# Patient Record
Sex: FEMALE | Race: BLACK OR AFRICAN AMERICAN | NOT HISPANIC OR LATINO | Employment: FULL TIME | URBAN - METROPOLITAN AREA
[De-identification: names, ages, dates, MRNs, and addresses within clinical notes are randomized per-mention and may not be internally consistent; named-entity substitution may affect disease eponyms.]

---

## 2017-02-22 ENCOUNTER — OFFICE VISIT (OUTPATIENT)
Dept: FAMILY MEDICINE | Facility: CLINIC | Age: 22
End: 2017-02-22
Payer: COMMERCIAL

## 2017-02-22 VITALS
OXYGEN SATURATION: 96 % | HEART RATE: 111 BPM | RESPIRATION RATE: 15 BRPM | HEIGHT: 62 IN | DIASTOLIC BLOOD PRESSURE: 66 MMHG | TEMPERATURE: 99.1 F | SYSTOLIC BLOOD PRESSURE: 110 MMHG | BODY MASS INDEX: 24.29 KG/M2 | WEIGHT: 132 LBS

## 2017-02-22 DIAGNOSIS — R21 RASH: Primary | ICD-10-CM

## 2017-02-22 PROCEDURE — 99213 OFFICE O/P EST LOW 20 MIN: CPT | Performed by: FAMILY MEDICINE

## 2017-02-22 RX ORDER — PERMETHRIN 50 MG/G
CREAM TOPICAL
Qty: 60 G | Refills: 1 | Status: SHIPPED | OUTPATIENT
Start: 2017-02-22 | End: 2018-01-11

## 2017-02-22 NOTE — NURSING NOTE
"Chief Complaint   Patient presents with     Derm Problem     possible scabies       Initial /66 (BP Location: Right arm, Patient Position: Chair, Cuff Size: Adult Regular)  Pulse 111  Temp 99.1  F (37.3  C) (Oral)  Resp 15  Ht 5' 2\" (1.575 m)  Wt 132 lb (59.9 kg)  SpO2 96%  BMI 24.14 kg/m2 Estimated body mass index is 24.14 kg/(m^2) as calculated from the following:    Height as of this encounter: 5' 2\" (1.575 m).    Weight as of this encounter: 132 lb (59.9 kg).  Medication Reconciliation: complete     Larry Tejeda CMA      "

## 2017-02-22 NOTE — PROGRESS NOTES
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      Duration: 3 days    Description  Location: chest, neck  Itching: moderate    Intensity:  Spots, redness    Accompanying signs and symptoms: see above    History (similar episodes/previous evaluation): None    Precipitating or alleviating factors:  New exposures:  None and works at a nursing home  Recent travel: no      Therapies tried and outcome: none       Past Medical History   Diagnosis Date     CAL (generalised anxiety disorder)      Moderate major depression (H)      Current Outpatient Prescriptions   Medication Sig Dispense Refill     permethrin (ELIMITE) 5 % cream Apply cream from head to toe (except the face); leave on for 8-14 hours then wash off with water; reapply in 1 week if live mites appear. 60 g 1     escitalopram (LEXAPRO) 10 MG tablet Take 1.5 tablets (15 mg) by mouth daily 30 tablet 0     Social History   Substance Use Topics     Smoking status: Never Smoker     Smokeless tobacco: Never Used     Alcohol use No       ROS:  CONSTITUTIONAL:NEGATIVE for fever, chills, change in weight  EYES: NEGATIVE for vision changes or irritation    EXAM:   /66 (BP Location: Right arm, Patient Position: Chair, Cuff Size: Adult Regular)  Pulse 111  Temp 99.1  F (37.3  C) (Oral)  Resp 15  Ht 5' 2\" (1.575 m)  Wt 132 lb (59.9 kg)  SpO2 96%  BMI 24.14 kg/m2  GENERAL: alert, no acute distress.  SKIN: Rash description:    Distribution: localized  Location: L arm, upper, chest and neck    Color: red,  Lesion type: tiny pinpoint linear  with no other findings  GENERAL APPEARANCE: healthy, alert and no distress  EYES: EOMI,  PERRL, conjunctiva clear  NECK: supple, non-tender to palpation, no adenopathy noted  RESP: lungs clear to auscultation - no rales, rhonchi or wheezes  CV: regular rates " and rhythm, normal S1 S2, no murmur noted    ASSESSMENT:  Rash    PLAN:  Cover for scabies but dont think this is the case.     1) See today's orders.  2) Follow-up with primary clinic if not improving

## 2017-02-22 NOTE — MR AVS SNAPSHOT
"              After Visit Summary   2/22/2017    Mica Roy    MRN: [id number]           Patient Information     Date Of Birth          1995        Visit Information        Provider Department      2/22/2017 3:00 PM Awais Lynn MD [facility]        Today's Diagnoses     Rash    -  1       Follow-ups after your visit        Who to contact     If you have questions or need follow up information about today's clinic visit or your schedule please contact [facility] directly at [phone].  Normal or non-critical lab and imaging results will be communicated to you by MyChart, letter or phone within 4 business days after the clinic has received the results. If you do not hear from us within 7 days, please contact the clinic through MyChart or phone. If you have a critical or abnormal lab result, we will notify you by phone as soon as possible.  Submit refill requests through [employer] or call your pharmacy and they will forward the refill request to us. Please allow 3 business days for your refill to be completed.          Additional Information About Your Visit        Care EveryWhere ID     This is your Care EveryWhere ID. This could be used by other organizations to access your [facility] medical records  [id number]        Your Vitals Were     Pulse Temperature Respirations Height Pulse Oximetry BMI (Body Mass Index)    111 99.1  F (37.3  C) (Oral) 15 5' 2\" (1.575 m) 96% 24.14 kg/m2       Blood Pressure from Last 3 Encounters:   02/22/17 110/66   12/07/16 106/70   10/03/16 90/64    Weight from Last 3 Encounters:   02/22/17 132 lb (59.9 kg)   12/07/16 130 lb (59 kg)   10/03/16 125 lb (56.7 kg)              Today, you had the following     No orders found for display         Today's Medication Changes          These changes are accurate as of: 2/22/17  3:38 PM.  If you have any questions, ask your nurse or doctor.               Start taking these medicines.        " Dose/Directions    permethrin 5 % cream   Commonly known as:  ELIMITE   Used for:  Rash   Started by:  Awais Lynn MD        Apply cream from head to toe (except the face); leave on for 8-14 hours then wash off with water; reapply in 1 week if live mites appear.   Quantity:  60 g   Refills:  1            Where to get your medicines      These medications were sent to [facility] [id number]  [place], [place] - [address] AT [place] [address]  [address], [place] [address]     Phone:  [phone]     permethrin 5 % cream                Primary Care Provider Office Phone # Fax #    Karen Weiler, [phone]       [facility] [address]  [place] [address]        Thank you!     Thank you for choosing [facility]  for your care. Our goal is always to provide you with excellent care. Hearing back from our patients is one way we can continue to improve our services. Please take a few minutes to complete the written survey that you may receive in the mail after your visit with us. Thank you!             Your Updated Medication List - Protect others around you: Learn how to safely use, store and throw away your medicines at www.disposemymeds.org.          This list is accurate as of: 2/22/17  3:38 PM.  Always use your most recent med list.                   Brand Name Dispense Instructions for use    escitalopram 10 MG tablet    LEXAPRO    30 tablet    Take 1.5 tablets (15 mg) by mouth daily       permethrin 5 % cream    ELIMITE    60 g    Apply cream from head to toe (except the face); leave on for 8-14 hours then wash off with water; reapply in 1 week if live mites appear.

## 2017-03-14 DIAGNOSIS — F41.1 GENERALIZED ANXIETY DISORDER: ICD-10-CM

## 2017-03-14 DIAGNOSIS — F90.9 ATTENTION DEFICIT HYPERACTIVITY DISORDER (ADHD), UNSPECIFIED ADHD TYPE: ICD-10-CM

## 2017-03-14 DIAGNOSIS — F33.1 MAJOR DEPRESSIVE DISORDER, RECURRENT EPISODE, MODERATE (H): ICD-10-CM

## 2017-03-14 NOTE — TELEPHONE ENCOUNTER
Please call patient at [phone] when rx is ready to  at .  (Ok to leave detailed message).    Refill request received via: phone  Patient requesting refill for: Adderall 10 mg and Lexapro    Last Office Visit: 12/7/2016  Last Refill (see below):    amphetamine-dextroamphetamine (ADDERALL XR) 10 MG per capsule (Discontinued) 30 capsule 0 11/27/2015 12/31/2015 No   Sig: Take 1 capsule (10 mg) by mouth daily   Class: Local Print   Route: Oral   Reason for Discontinue: Reorder   Order: [id number]     Tonja Young  -[facility]

## 2017-03-14 NOTE — TELEPHONE ENCOUNTER
Lexapro     Last Written Prescription Date: 10/3/16  Last Fill Quantity: 30, # refills: 0  Last Office Visit with G primary care provider:  12/7/16        Last PHQ-9 score on record=   PHQ-9 SCORE 12/7/2016   Total Score 18     Routing refill request to provider for review/approval because:  Labs out of range:  PHQ-9  A break in medication  Luzmaria Pena RN, BSN  [facility]

## 2017-03-15 RX ORDER — ESCITALOPRAM OXALATE 10 MG/1
15 TABLET ORAL DAILY
Qty: 45 TABLET | Refills: 0 | Status: SHIPPED | OUTPATIENT
Start: 2017-03-15 | End: 2018-01-11

## 2017-03-15 RX ORDER — DEXTROAMPHETAMINE SACCHARATE, AMPHETAMINE ASPARTATE MONOHYDRATE, DEXTROAMPHETAMINE SULFATE AND AMPHETAMINE SULFATE 2.5; 2.5; 2.5; 2.5 MG/1; MG/1; MG/1; MG/1
10 CAPSULE, EXTENDED RELEASE ORAL DAILY
Qty: 30 CAPSULE | Refills: 0 | Status: SHIPPED | OUTPATIENT
Start: 2017-03-15 | End: 2017-10-05

## 2017-06-28 ENCOUNTER — TELEPHONE (OUTPATIENT)
Dept: FAMILY MEDICINE | Facility: CLINIC | Age: 22
End: 2017-06-28

## 2017-06-28 NOTE — LETTER
[facility]  [address]  [facility] [id number] [phone]  July 14, 2017    Mica Roy  [address] [facility] [address] [address]    Dear Mica,    I care about your health and have reviewed your health plan. I have reviewed your medical conditions, medication list, and lab results and am making recommendations based on this review, to better manage your health.    You are in particular need of attention regarding:  -Depression    I am recommending that you:  -Please complete the attached questionnaire and mail it back to us in the enclosed envelope.    Here is a list of Health Maintenance topics that are due now or due soon:  Health Maintenance Due   Topic Date Due     HPV IMMUNIZATION (3 of 3 - Female 3 Dose Series) 12/30/2016     PHQ-9 Q6 MONTHS  06/07/2017       Please call us at [phone] (or use [email or web address]) to address the above recommendations.     Thank you for trusting [facility] and we appreciate the opportunity to serve you.  We look forward to supporting your healthcare needs in the future.    Healthy Regards,    Karen Weiler, MD

## 2017-06-28 NOTE — TELEPHONE ENCOUNTER
Needs of attention regarding:  -Depression    Health Maintenance Topics with due status: Overdue       Topic Date Due    HPV IMMUNIZATION 12/30/2016    PHQ-9 Q6 MONTHS 06/07/2017       Communication:  Patient due for a PHQ-9 between 5/7/2017-7/7/2017

## 2017-10-03 DIAGNOSIS — F90.9 ATTENTION DEFICIT HYPERACTIVITY DISORDER (ADHD), UNSPECIFIED ADHD TYPE: ICD-10-CM

## 2017-10-03 RX ORDER — DEXTROAMPHETAMINE SACCHARATE, AMPHETAMINE ASPARTATE MONOHYDRATE, DEXTROAMPHETAMINE SULFATE AND AMPHETAMINE SULFATE 2.5; 2.5; 2.5; 2.5 MG/1; MG/1; MG/1; MG/1
10 CAPSULE, EXTENDED RELEASE ORAL DAILY
Qty: 30 CAPSULE | Refills: 0 | Status: CANCELLED | OUTPATIENT
Start: 2017-10-03

## 2017-10-03 NOTE — TELEPHONE ENCOUNTER
Please call patient at [phone] when rx is ready to  at .  (Ok to leave detailed message).    Refill request received via: walk in  Patient requesting refill for: adderall    Last Office Visit: 2/22/17  Last Refill (see below):    MG per 24 hr capsule 30 capsule 0 3/15/2017  No   Sig: Take 1 capsule (10 mg) by mouth daily   Class: Local Print   Route: Oral   Order: [id number]     Tonja Young  Patient Representative-[facility]

## 2017-10-04 NOTE — TELEPHONE ENCOUNTER
Please have patient make a follow up appointment for this medication. She has not been on it in over a year.    Karen Weiler, MD

## 2017-10-05 ENCOUNTER — OFFICE VISIT (OUTPATIENT)
Dept: FAMILY MEDICINE | Facility: CLINIC | Age: 22
End: 2017-10-05
Payer: COMMERCIAL

## 2017-10-05 VITALS
OXYGEN SATURATION: 100 % | WEIGHT: 129 LBS | HEART RATE: 120 BPM | HEIGHT: 62 IN | TEMPERATURE: 98.5 F | SYSTOLIC BLOOD PRESSURE: 112 MMHG | DIASTOLIC BLOOD PRESSURE: 68 MMHG | BODY MASS INDEX: 23.74 KG/M2

## 2017-10-05 DIAGNOSIS — F32.A DEPRESSION, UNSPECIFIED DEPRESSION TYPE: Primary | ICD-10-CM

## 2017-10-05 DIAGNOSIS — Z23 NEED FOR PROPHYLACTIC VACCINATION AND INOCULATION AGAINST INFLUENZA: ICD-10-CM

## 2017-10-05 DIAGNOSIS — F90.9 ATTENTION DEFICIT HYPERACTIVITY DISORDER (ADHD), UNSPECIFIED ADHD TYPE: ICD-10-CM

## 2017-10-05 DIAGNOSIS — Z23 NEED FOR HPV VACCINE: ICD-10-CM

## 2017-10-05 PROCEDURE — 90686 IIV4 VACC NO PRSV 0.5 ML IM: CPT | Performed by: PHYSICIAN ASSISTANT

## 2017-10-05 PROCEDURE — 90472 IMMUNIZATION ADMIN EACH ADD: CPT | Performed by: PHYSICIAN ASSISTANT

## 2017-10-05 PROCEDURE — 90471 IMMUNIZATION ADMIN: CPT | Performed by: PHYSICIAN ASSISTANT

## 2017-10-05 PROCEDURE — 99214 OFFICE O/P EST MOD 30 MIN: CPT | Mod: 25 | Performed by: PHYSICIAN ASSISTANT

## 2017-10-05 PROCEDURE — 90651 9VHPV VACCINE 2/3 DOSE IM: CPT | Performed by: PHYSICIAN ASSISTANT

## 2017-10-05 RX ORDER — CETIRIZINE HYDROCHLORIDE 10 MG/1
10 TABLET ORAL DAILY
COMMUNITY
End: 2018-06-12

## 2017-10-05 RX ORDER — BUPROPION HYDROCHLORIDE 150 MG/1
150 TABLET ORAL EVERY MORNING
Qty: 30 TABLET | Refills: 1 | Status: SHIPPED | OUTPATIENT
Start: 2017-10-05 | End: 2017-12-07

## 2017-10-05 RX ORDER — ADAPALENE 0.1 G/100G
CREAM TOPICAL
COMMUNITY
Start: 2016-04-22 | End: 2018-01-11

## 2017-10-05 RX ORDER — DEXTROAMPHETAMINE SACCHARATE, AMPHETAMINE ASPARTATE MONOHYDRATE, DEXTROAMPHETAMINE SULFATE AND AMPHETAMINE SULFATE 2.5; 2.5; 2.5; 2.5 MG/1; MG/1; MG/1; MG/1
10 CAPSULE, EXTENDED RELEASE ORAL DAILY
Qty: 30 CAPSULE | Refills: 0 | Status: SHIPPED | OUTPATIENT
Start: 2017-10-05 | End: 2017-12-07

## 2017-10-05 ASSESSMENT — ANXIETY QUESTIONNAIRES
2. NOT BEING ABLE TO STOP OR CONTROL WORRYING: NEARLY EVERY DAY
5. BEING SO RESTLESS THAT IT IS HARD TO SIT STILL: NEARLY EVERY DAY
GAD7 TOTAL SCORE: 12
7. FEELING AFRAID AS IF SOMETHING AWFUL MIGHT HAPPEN: SEVERAL DAYS
IF YOU CHECKED OFF ANY PROBLEMS ON THIS QUESTIONNAIRE, HOW DIFFICULT HAVE THESE PROBLEMS MADE IT FOR YOU TO DO YOUR WORK, TAKE CARE OF THINGS AT HOME, OR GET ALONG WITH OTHER PEOPLE: VERY DIFFICULT
6. BECOMING EASILY ANNOYED OR IRRITABLE: NOT AT ALL
1. FEELING NERVOUS, ANXIOUS, OR ON EDGE: MORE THAN HALF THE DAYS
3. WORRYING TOO MUCH ABOUT DIFFERENT THINGS: NEARLY EVERY DAY

## 2017-10-05 ASSESSMENT — PATIENT HEALTH QUESTIONNAIRE - PHQ9
SUM OF ALL RESPONSES TO PHQ QUESTIONS 1-9: 15
5. POOR APPETITE OR OVEREATING: NOT AT ALL

## 2017-10-05 NOTE — PATIENT INSTRUCTIONS
Bupropion extended-release tablets (Depression/Mood Disorders)  What is this medicine?  BUPROPION (byoo PROE pee on) is used to treat depression.  How should I use this medicine?  Take this medicine by mouth with a glass of water. Follow the directions on the prescription label. You can take it with or without food. If it upsets your stomach, take it with food. Do not crush, chew, or cut these tablets. This medicine is taken once daily at the same time each day. Do not take your medicine more often than directed. Do not stop taking this medicine suddenly except upon the advice of your doctor. Stopping this medicine too quickly may cause serious side effects or your condition may worsen.  A special MedGuide will be given to you by the pharmacist with each prescription and refill. Be sure to read this information carefully each time.  Talk to your pediatrician regarding the use of this medicine in children. Special care may be needed.  What side effects may I notice from receiving this medicine?  Side effects that you should report to your doctor or health care professional as soon as possible:    allergic reactions like skin rash, itching or hives, swelling of the face, lips, or tongue    breathing problems    changes in vision    confusion    fast or irregular heartbeat    hallucinations    increased blood pressure    redness, blistering, peeling or loosening of the skin, including inside the mouth    seizures    suicidal thoughts or other mood changes    unusually weak or tired    vomiting  Side effects that usually do not require medical attention (report to your doctor or health care professional if they continue or are bothersome):    change in sex drive or performance    constipation    headache    loss of appetite    nausea    tremors    weight loss  What may interact with this medicine?  Do not take this medicine with any of the following medications:    linezolid    MAOIs like Azilect, Carbex, Eldepryl,  Marplan, Nardil, and Parnate    methylene blue (injected into a vein)    other medicines that contain bupropion like Zyban  This medicine may also interact with the following medications:    alcohol    certain medicines for anxiety or sleep    certain medicines for blood pressure like metoprolol, propranolol    certain medicines for depression or psychotic disturbances    certain medicines for HIV or AIDS like efavirenz, lopinavir, nelfinavir, ritonavir    certain medicines for irregular heart beat like propafenone, flecainide    certain medicines for Parkinson's disease like amantadine, levodopa    certain medicines for seizures like carbamazepine, phenytoin, phenobarbital    cimetidine    clopidogrel    cyclophosphamide    furazolidone    isoniazid    nicotine    orphenadrine    procarbazine    steroid medicines like prednisone or cortisone    stimulant medicines for attention disorders, weight loss, or to stay awake    tamoxifen    theophylline    thiotepa    ticlopidine    tramadol    warfarin  What if I miss a dose?  If you miss a dose, skip the missed dose and take your next tablet at the regular time. Do not take double or extra doses.  Where should I keep my medicine?  Keep out of the reach of children.  Store at room temperature between 15 and 30 degrees C (59 and 86 degrees F). Throw away any unused medicine after the expiration date.  What should I tell my health care provider before I take this medicine?  They need to know if you have any of these conditions:    an eating disorder, such as anorexia or bulimia    bipolar disorder or psychosis    diabetes or high blood sugar, treated with medication    glaucoma    head injury or brain tumor    heart disease, previous heart attack, or irregular heart beat    high blood pressure    kidney or liver disease    seizures (convulsions)    suicidal thoughts or a previous suicide attempt    Tourette's syndrome    weight loss    an unusual or allergic reaction to  bupropion, other medicines, foods, dyes, or preservatives    breast-feeding    pregnant or trying to become pregnant  What should I watch for while using this medicine?  Tell your doctor if your symptoms do not get better or if they get worse. Visit your doctor or health care professional for regular checks on your progress. Because it may take several weeks to see the full effects of this medicine, it is important to continue your treatment as prescribed by your doctor.  Patients and their families should watch out for new or worsening thoughts of suicide or depression. Also watch out for sudden changes in feelings such as feeling anxious, agitated, panicky, irritable, hostile, aggressive, impulsive, severely restless, overly excited and hyperactive, or not being able to sleep. If this happens, especially at the beginning of treatment or after a change in dose, call your health care professional.  Avoid alcoholic drinks while taking this medicine. Drinking large amounts of alcoholic beverages, using sleeping or anxiety medicines, or quickly stopping the use of these agents while taking this medicine may increase your risk for a seizure.  Do not drive or use heavy machinery until you know how this medicine affects you. This medicine can impair your ability to perform these tasks.  Do not take this medicine close to bedtime. It may prevent you from sleeping.  Your mouth may get dry. Chewing sugarless gum or sucking hard candy, and drinking plenty of water may help. Contact your doctor if the problem does not go away or is severe.  The tablet shell for some brands of this medicine does not dissolve. This is normal. The tablet shell may appear whole in the stool. This is not a cause for concern.  NOTE:This sheet is a summary. It may not cover all possible information. If you have questions about this medicine, talk to your doctor, pharmacist, or health care provider. Copyright  2017 [employer]

## 2017-10-05 NOTE — NURSING NOTE
"Chief Complaint   Patient presents with     Depression     Anxiety     Recheck Medication       Initial /68 (BP Location: Right arm, Patient Position: Sitting, Cuff Size: Adult Regular)  Pulse 120  Temp 98.5  F (36.9  C) (Oral)  Ht 5' 2\" (1.575 m)  Wt 129 lb (58.5 kg)  LMP 10/04/2017  SpO2 100%  BMI 23.59 kg/m2 Estimated body mass index is 23.59 kg/(m^2) as calculated from the following:    Height as of this encounter: 5' 2\" (1.575 m).    Weight as of this encounter: 129 lb (58.5 kg).  Medication Reconciliation: complete   Rhea Durham MA    "

## 2017-10-05 NOTE — PROGRESS NOTES
SUBJECTIVE:   Mica Roy is a 22 year old female who presents to clinic today for the following health issues:    Pt retried Lexapro in March 2017, first time she took it in 2016, pt felt fine with it  but this time it didn't work out for her, pt felt nauseous from it and actually vomited and stopped taking it after 2 weeks.     Depression and Anxiety Follow-Up    Status since last visit: No change    Other associated symptoms:Pt's energy is down, pt has been skipping some of her classes, pt says she has no motivation to get up and get her day going.  Pt says her mom has noticed that she's changed. Pt has a hard time concentrating in class and doing everyday things.    Complicating factors:     Significant life event: Yes-  Pt says she broke up with her boyfriend but that didn't cause any change in mood     Current substance abuse: None    PHQ-9 Score and MyChart F/U Questions 8/30/2016 12/7/2016 10/5/2017   Total Score 15 18 15   Q9: Suicide Ideation Not at all Several days Not at all     CAL-7 SCORE 8/30/2016 12/7/2016 10/5/2017   Total Score - - -   Total Score 12 14 12     In the past two weeks have you had thoughts of suicide or self-harm?  No.    Do you have concerns about your personal safety or the safety of others?   No    PHQ-9  English  PHQ-9   Any Language  GAD7  Suicide Assessment Five-step Evaluation and Treatment (SAFE-T)      Amount of exercise or physical activity: 4-5 days/week for an average of 2 hours, pt has been obsessing over calories in th gym, and make sure she burns 100 calories each time she goes to the gym.    Problems taking medications regularly: Hasn't been taking medications regularly    Medication side effects: none  Diet: regular (no restrictions)    Medication Followup of Adderall    Taking Medication as prescribed: yes    Side Effects:  None    Medication Helping Symptoms:  She has been taking it on and off, she mentioned more towards the summer she didn't take it.  Pt tries  to take it everyday now that schools started, but she says she feels weird with taking it, Pt says it's hard to fall asleep when she takes it.      Took Lexapro for almost a year and was feeling good on it    Takes Adderall around 8 AM and has a hard time falling asleep at night on the days she takes it. Sleeps better on nights when she doesn't take Adderall in the morning.  Occ trouble breathing with Adderall. No appetite suppression, chest pain, abdominal pain, headaches, palpitations.    Problem list and histories reviewed & adjusted, as indicated.  Additional history: as documented    Patient Active Problem List   Diagnosis     Achilles tendonitis     Knee pain     Moderate major depression (H)     Generalized anxiety disorder     Attention deficit hyperactivity disorder (ADHD)     Past Surgical History:   Procedure Laterality Date     NO HISTORY OF SURGERY         Social History   Substance Use Topics     Smoking status: Never Smoker     Smokeless tobacco: Never Used     Alcohol use No     Family History   Problem Relation Age of Onset     Depression Mother      DIABETES No family hx of      Coronary Artery Disease No family hx of      Hypertension Mother      Hyperlipidemia No family hx of      CEREBROVASCULAR DISEASE No family hx of      Breast Cancer No family hx of      Colon Cancer No family hx of      Other Cancer No family hx of      OSTEOPOROSIS Mother      Thyroid Disease No family hx of          Current Outpatient Prescriptions   Medication Sig Dispense Refill     cetirizine (ZYRTEC) 10 MG tablet Take 10 mg by mouth daily       buPROPion (WELLBUTRIN XL) 150 MG 24 hr tablet Take 1 tablet (150 mg) by mouth every morning 30 tablet 1     amphetamine-dextroamphetamine (ADDERALL XR) 10 MG per 24 hr capsule Take 1 capsule (10 mg) by mouth daily 30 capsule 0     adapalene (DIFFERIN) 0.1 % cream Apply to pea-size amount to face at bedtime. Start every other night, increase to nightly as tolerated.        "benzoyl peroxide 5 % LIQD Wash face, chest, back daily in shower. Rinse thoroughly.       escitalopram (LEXAPRO) 10 MG tablet Take 1.5 tablets (15 mg) by mouth daily (Patient not taking: Reported on 10/5/2017) 45 tablet 0     permethrin (ELIMITE) 5 % cream Apply cream from head to toe (except the face); leave on for 8-14 hours then wash off with water; reapply in 1 week if live mites appear. (Patient not taking: Reported on 10/5/2017) 60 g 1     Allergies   Allergen Reactions     Seasonal Allergies          Reviewed and updated as needed this visit by clinical staff     Reviewed and updated as needed this visit by Provider         ROS:  CONSTITUTIONAL:NEGATIVE  for chills and fever  RESP:POSITIVE for intermittent SOB  CV: NEGATIVE for chest pain, palpitations or peripheral edema  GI: NEGATIVE for nausea, abdominal pain, heartburn, or change in bowel habits  PSYCHIATRIC: POSITIVE for depression, ADD symptoms, sleep issues    OBJECTIVE:     /68 (BP Location: Right arm, Patient Position: Sitting, Cuff Size: Adult Regular)  Pulse 120  Temp 98.5  F (36.9  C) (Oral)  Ht 5' 2\" (1.575 m)  Wt 129 lb (58.5 kg)  LMP 10/04/2017  SpO2 100%  BMI 23.59 kg/m2  Body mass index is 23.59 kg/(m^2).  GENERAL: healthy, alert and no distress  RESP: lungs clear to auscultation - no rales, rhonchi or wheezes  CV: regular rate and rhythm, normal S1 S2, no S3 or S4, no murmur, click or rub, no peripheral edema and peripheral pulses strong  PSYCH: mentation appears normal, affect normal/bright    Diagnostic Test Results:  none     ASSESSMENT/PLAN:     1. Depression, unspecified depression type  Will try Wellbutrin for depression symptoms. Discussed possible side effects. Follow-up in 1 month for recheck. May need to increase dose to 300mg.  - buPROPion (WELLBUTRIN XL) 150 MG 24 hr tablet; Take 1 tablet (150 mg) by mouth every morning  Dispense: 30 tablet; Refill: 1    2. Attention deficit hyperactivity disorder (ADHD), unspecified " ADHD type  Appears to be sensitive to stimulant effects of Adderall (particularly in terms of sleep). Will try non-stimulant medication that will also ideally help with her depression. Discussed that bupropion has slower onset of action than stimulants, so OK to continue on Adderall for the next month. May need to increase bupropion to 300mg after 1 month if tolerating well.  - buPROPion (WELLBUTRIN XL) 150 MG 24 hr tablet; Take 1 tablet (150 mg) by mouth every morning  Dispense: 30 tablet; Refill: 1  - amphetamine-dextroamphetamine (ADDERALL XR) 10 MG per 24 hr capsule; Take 1 capsule (10 mg) by mouth daily  Dispense: 30 capsule; Refill: 0    3. Need for HPV vaccine  - HUMAN PAPILLOMA VIRUS (GARDASIL 9) VACCINE  - EA ADD'L VACCINE    4. Need for prophylactic vaccination and inoculation against influenza  - FLU VAC, SPLIT VIRUS IM > 3 YO (QUADRIVALENT) [77709]  - Vaccine Administration, Initial [42683]    Yue Oliver PA-C  [facility]      Injectable Influenza Immunization Documentation    1.  Is the person to be vaccinated sick today?   No    2. Does the person to be vaccinated have an allergy to a component   of the vaccine?   No    3. Has the person to be vaccinated ever had a serious reaction   to influenza vaccine in the past?   No    4. Has the person to be vaccinated ever had Guillain-Barré syndrome?   No    Form completed by Rhea Durham MA

## 2017-10-05 NOTE — MR AVS SNAPSHOT
After Visit Summary   10/5/2017    Mica Roy    MRN: [id number]           Patient Information     Date Of Birth          1995        Visit Information        Provider Department      10/5/2017 11:40 AM Yue Oliver PA-C [facility]        Today's Diagnoses     Depression, unspecified depression type    -  1    Attention deficit hyperactivity disorder (ADHD), unspecified ADHD type        Need for HPV vaccine        Need for prophylactic vaccination and inoculation against influenza          Care Instructions      Bupropion extended-release tablets (Depression/Mood Disorders)  What is this medicine?  BUPROPION (byoo PROE pee on) is used to treat depression.  How should I use this medicine?  Take this medicine by mouth with a glass of water. Follow the directions on the prescription label. You can take it with or without food. If it upsets your stomach, take it with food. Do not crush, chew, or cut these tablets. This medicine is taken once daily at the same time each day. Do not take your medicine more often than directed. Do not stop taking this medicine suddenly except upon the advice of your doctor. Stopping this medicine too quickly may cause serious side effects or your condition may worsen.  A special MedGuide will be given to you by the pharmacist with each prescription and refill. Be sure to read this information carefully each time.  Talk to your pediatrician regarding the use of this medicine in children. Special care may be needed.  What side effects may I notice from receiving this medicine?  Side effects that you should report to your doctor or health care professional as soon as possible:    allergic reactions like skin rash, itching or hives, swelling of the face, lips, or tongue    breathing problems    changes in vision    confusion    fast or irregular heartbeat    hallucinations    increased blood pressure    redness, blistering, peeling or loosening of the  skin, including inside the mouth    seizures    suicidal thoughts or other mood changes    unusually weak or tired    vomiting  Side effects that usually do not require medical attention (report to your doctor or health care professional if they continue or are bothersome):    change in sex drive or performance    constipation    headache    loss of appetite    nausea    tremors    weight loss  What may interact with this medicine?  Do not take this medicine with any of the following medications:    linezolid    MAOIs like Azilect, Carbex, Eldepryl, Marplan, Nardil, and Parnate    methylene blue (injected into a vein)    other medicines that contain bupropion like Zyban  This medicine may also interact with the following medications:    alcohol    certain medicines for anxiety or sleep    certain medicines for blood pressure like metoprolol, propranolol    certain medicines for depression or psychotic disturbances    certain medicines for HIV or AIDS like efavirenz, lopinavir, nelfinavir, ritonavir    certain medicines for irregular heart beat like propafenone, flecainide    certain medicines for Parkinson's disease like amantadine, levodopa    certain medicines for seizures like carbamazepine, phenytoin, phenobarbital    cimetidine    clopidogrel    cyclophosphamide    furazolidone    isoniazid    nicotine    orphenadrine    procarbazine    steroid medicines like prednisone or cortisone    stimulant medicines for attention disorders, weight loss, or to stay awake    tamoxifen    theophylline    thiotepa    ticlopidine    tramadol    warfarin  What if I miss a dose?  If you miss a dose, skip the missed dose and take your next tablet at the regular time. Do not take double or extra doses.  Where should I keep my medicine?  Keep out of the reach of children.  Store at room temperature between 15 and 30 degrees C (59 and 86 degrees F). Throw away any unused medicine after the expiration date.  What should I tell my  health care provider before I take this medicine?  They need to know if you have any of these conditions:    an eating disorder, such as anorexia or bulimia    bipolar disorder or psychosis    diabetes or high blood sugar, treated with medication    glaucoma    head injury or brain tumor    heart disease, previous heart attack, or irregular heart beat    high blood pressure    kidney or liver disease    seizures (convulsions)    suicidal thoughts or a previous suicide attempt    Tourette's syndrome    weight loss    an unusual or allergic reaction to bupropion, other medicines, foods, dyes, or preservatives    breast-feeding    pregnant or trying to become pregnant  What should I watch for while using this medicine?  Tell your doctor if your symptoms do not get better or if they get worse. Visit your doctor or health care professional for regular checks on your progress. Because it may take several weeks to see the full effects of this medicine, it is important to continue your treatment as prescribed by your doctor.  Patients and their families should watch out for new or worsening thoughts of suicide or depression. Also watch out for sudden changes in feelings such as feeling anxious, agitated, panicky, irritable, hostile, aggressive, impulsive, severely restless, overly excited and hyperactive, or not being able to sleep. If this happens, especially at the beginning of treatment or after a change in dose, call your health care professional.  Avoid alcoholic drinks while taking this medicine. Drinking large amounts of alcoholic beverages, using sleeping or anxiety medicines, or quickly stopping the use of these agents while taking this medicine may increase your risk for a seizure.  Do not drive or use heavy machinery until you know how this medicine affects you. This medicine can impair your ability to perform these tasks.  Do not take this medicine close to bedtime. It may prevent you from sleeping.  Your mouth  "may get dry. Chewing sugarless gum or sucking hard candy, and drinking plenty of water may help. Contact your doctor if the problem does not go away or is severe.  The tablet shell for some brands of this medicine does not dissolve. This is normal. The tablet shell may appear whole in the stool. This is not a cause for concern.  NOTE:This sheet is a summary. It may not cover all possible information. If you have questions about this medicine, talk to your doctor, pharmacist, or health care provider. Copyright  2017 Gold Standard                Follow-ups after your visit        Who to contact     If you have questions or need follow up information about today's clinic visit or your schedule please contact [facility] directly at [phone].  Normal or non-critical lab and imaging results will be communicated to you by ProteoTechhart, letter or phone within 4 business days after the clinic has received the results. If you do not hear from us within 7 days, please contact the clinic through ProteoTechhart or phone. If you have a critical or abnormal lab result, we will notify you by phone as soon as possible.  Submit refill requests through Paystik or call your pharmacy and they will forward the refill request to us. Please allow 3 business days for your refill to be completed.          Additional Information About Your Visit        Paystik Information     Paystik lets you send messages to your doctor, view your test results, renew your prescriptions, schedule appointments and more. To sign up, go to [email or web address] . Click on \"Log in\" on the left side of the screen, which will take you to the Welcome page. Then click on \"Sign up Now\" on the right side of the page.     You will be asked to enter the access code listed below, as well as some personal information. Please follow the directions to create your username and password.     Your access code is: [email or web address]  Expires: 1/3/2018 12:26 PM     Your access " "code will  in 90 days. If you need help or a new code, please call your [facility] clinic or [phone].        Care EveryWhere ID     This is your Care EveryWhere ID. This could be used by other organizations to access your [facility] medical records  [id number]        Your Vitals Were     Pulse Temperature Height Last Period Pulse Oximetry BMI (Body Mass Index)    120 98.5  F (36.9  C) (Oral) 5' 2\" (1.575 m) 10/04/2017 100% 23.59 kg/m2       Blood Pressure from Last 3 Encounters:   10/05/17 112/68   17 110/66   16 106/70    Weight from Last 3 Encounters:   10/05/17 129 lb (58.5 kg)   17 132 lb (59.9 kg)   16 130 lb (59 kg)              We Performed the Following     FLU VAC, SPLIT VIRUS IM > 3 YO (QUADRIVALENT) [48431]     Vaccine Administration, Initial [38790]          Today's Medication Changes          These changes are accurate as of: 10/5/17 12:26 PM.  If you have any questions, ask your nurse or doctor.               Start taking these medicines.        Dose/Directions    buPROPion 150 MG 24 hr tablet   Commonly known as:  WELLBUTRIN XL   Used for:  Depression, unspecified depression type, Attention deficit hyperactivity disorder (ADHD), unspecified ADHD type   Started by:  Yue Oliver PA-C        Dose:  150 mg   Take 1 tablet (150 mg) by mouth every morning   Quantity:  30 tablet   Refills:  1            Where to get your medicines      [id number] - [place], [place] - [address] [facility] [address], [place] [address]     Phone:  [phone]     buPROPion 150 MG 24 hr tablet                Primary Care Provider Office Phone # Fax #    Karen Weiler, [phone] [address]  [place] [address]        Equal Access to Services     [facility] AH: Hadii john zelayao Sojohn, waaxda luqadaha, qaybta kaalmada adeegjavi purcell. So [facility] " [phone].    ATENCIÓN: Si luis parekh, tiene a mullen disposición servicios gratuitos de asistencia lingüística. Maverick smith [phone].    We comply with applicable federal civil rights laws and [place] laws. We do not discriminate on the basis of race, color, national origin, age, disability, sex, sexual orientation, or gender identity.            Thank you!     Thank you for choosing [facility]  for your care. Our goal is always to provide you with excellent care. Hearing back from our patients is one way we can continue to improve our services. Please take a few minutes to complete the written survey that you may receive in the mail after your visit with us. Thank you!             Your Updated Medication List - Protect others around you: Learn how to safely use, store and throw away your medicines at www.disposemymeds.org.          This list is accurate as of: 10/5/17 12:26 PM.  Always use your most recent med list.                   Brand Name Dispense Instructions for use Diagnosis    adapalene 0.1 % cream    DIFFERIN     Apply to pea-size amount to face at bedtime. Start every other night, increase to nightly as tolerated.        amphetamine-dextroamphetamine 10 MG per 24 hr capsule    ADDERALL XR    30 capsule    Take 1 capsule (10 mg) by mouth daily    Attention deficit hyperactivity disorder (ADHD), unspecified ADHD type       benzoyl peroxide 5 % Liqd      Wash face, chest, back daily in shower. Rinse thoroughly.        buPROPion 150 MG 24 hr tablet    WELLBUTRIN XL    30 tablet    Take 1 tablet (150 mg) by mouth every morning    Depression, unspecified depression type, Attention deficit hyperactivity disorder (ADHD), unspecified ADHD type       cetirizine 10 MG tablet    zyrTEC     Take 10 mg by mouth daily        escitalopram 10 MG tablet    LEXAPRO    45 tablet    Take 1.5 tablets (15 mg) by mouth daily    Major depressive disorder, recurrent episode, moderate (H), Generalized anxiety  disorder       permethrin 5 % cream    ELIMITE    60 g    Apply cream from head to toe (except the face); leave on for 8-14 hours then wash off with water; reapply in 1 week if live mites appear.    Rash

## 2017-10-06 ASSESSMENT — ANXIETY QUESTIONNAIRES: GAD7 TOTAL SCORE: 12

## 2017-12-05 DIAGNOSIS — F32.A DEPRESSION, UNSPECIFIED DEPRESSION TYPE: ICD-10-CM

## 2017-12-05 DIAGNOSIS — F90.9 ATTENTION DEFICIT HYPERACTIVITY DISORDER (ADHD), UNSPECIFIED ADHD TYPE: ICD-10-CM

## 2017-12-05 NOTE — TELEPHONE ENCOUNTER
Requested Prescriptions   Pending Prescriptions Disp Refills     buPROPion (WELLBUTRIN XL) 150 MG 24 hr tablet  Last Written Prescription Date:  10/5/2017  Last Fill Quantity: 30 tablet,  # refills: 1   Last Office Visit with [facility], P or [facility] prescribing provider:  10/5/2017   Future Office Visit:      30 tablet 1     Sig: Take 1 tablet (150 mg) by mouth every morning    SSRIs Protocol Failed    12/5/2017  1:42 PM       Failed - PHQ-9 score less than 5 in past 6 months    Please review PHQ-9 score.   PHQ-9 SCORE 8/30/2016 12/7/2016 10/5/2017   Total Score - - -   Total Score 15 18 15     CAL-7 SCORE 8/30/2016 12/7/2016 10/5/2017   Total Score - - -   Total Score 12 14 12          Failed - Medication is NOT Bupropion    If the medication is Bupropion (Wellbutrin), and the patient is taking for smoking cessation; OK to refill.         Failed - Recent (6 mo) or future visit with authorizing provider's specialty    Patient had office visit in the last 6 months or has a visit in the next 30 days with authorizing provider.  See chart review.            Passed - Patient is age 18 or older       Passed - No active pregnancy on record       Passed - No positive pregnancy test in last 12 months

## 2017-12-06 NOTE — TELEPHONE ENCOUNTER
Called [phone] and spoke with pt.  Appt made for tomorrow.  Routing to Lottie Her PA-C for tomorrow's appt.  Sabrina Cody

## 2017-12-07 ENCOUNTER — OFFICE VISIT (OUTPATIENT)
Dept: FAMILY MEDICINE | Facility: CLINIC | Age: 22
End: 2017-12-07
Payer: COMMERCIAL

## 2017-12-07 VITALS
BODY MASS INDEX: 23 KG/M2 | TEMPERATURE: 98.5 F | SYSTOLIC BLOOD PRESSURE: 104 MMHG | HEART RATE: 99 BPM | DIASTOLIC BLOOD PRESSURE: 68 MMHG | HEIGHT: 62 IN | OXYGEN SATURATION: 99 % | WEIGHT: 125 LBS

## 2017-12-07 DIAGNOSIS — F90.9 ATTENTION DEFICIT HYPERACTIVITY DISORDER (ADHD), UNSPECIFIED ADHD TYPE: ICD-10-CM

## 2017-12-07 DIAGNOSIS — F32.1 MODERATE MAJOR DEPRESSION (H): Primary | ICD-10-CM

## 2017-12-07 PROCEDURE — 99214 OFFICE O/P EST MOD 30 MIN: CPT | Performed by: PHYSICIAN ASSISTANT

## 2017-12-07 RX ORDER — BUPROPION HYDROCHLORIDE 150 MG/1
150 TABLET ORAL EVERY MORNING
Qty: 30 TABLET | Refills: 1 | Status: SHIPPED | OUTPATIENT
Start: 2017-12-07 | End: 2018-04-04

## 2017-12-07 RX ORDER — BUPROPION HYDROCHLORIDE 150 MG/1
150 TABLET ORAL EVERY MORNING
Qty: 30 TABLET | Refills: 1 | OUTPATIENT
Start: 2017-12-07

## 2017-12-07 RX ORDER — DEXTROAMPHETAMINE SACCHARATE, AMPHETAMINE ASPARTATE MONOHYDRATE, DEXTROAMPHETAMINE SULFATE AND AMPHETAMINE SULFATE 2.5; 2.5; 2.5; 2.5 MG/1; MG/1; MG/1; MG/1
10 CAPSULE, EXTENDED RELEASE ORAL DAILY
Qty: 30 CAPSULE | Refills: 0 | Status: SHIPPED | OUTPATIENT
Start: 2017-12-07 | End: 2018-04-25

## 2017-12-07 ASSESSMENT — PATIENT HEALTH QUESTIONNAIRE - PHQ9
SUM OF ALL RESPONSES TO PHQ QUESTIONS 1-9: 10
5. POOR APPETITE OR OVEREATING: NEARLY EVERY DAY

## 2017-12-07 ASSESSMENT — ANXIETY QUESTIONNAIRES
2. NOT BEING ABLE TO STOP OR CONTROL WORRYING: NEARLY EVERY DAY
3. WORRYING TOO MUCH ABOUT DIFFERENT THINGS: NEARLY EVERY DAY
GAD7 TOTAL SCORE: 17
6. BECOMING EASILY ANNOYED OR IRRITABLE: MORE THAN HALF THE DAYS
IF YOU CHECKED OFF ANY PROBLEMS ON THIS QUESTIONNAIRE, HOW DIFFICULT HAVE THESE PROBLEMS MADE IT FOR YOU TO DO YOUR WORK, TAKE CARE OF THINGS AT HOME, OR GET ALONG WITH OTHER PEOPLE: SOMEWHAT DIFFICULT
1. FEELING NERVOUS, ANXIOUS, OR ON EDGE: MORE THAN HALF THE DAYS
7. FEELING AFRAID AS IF SOMETHING AWFUL MIGHT HAPPEN: SEVERAL DAYS
5. BEING SO RESTLESS THAT IT IS HARD TO SIT STILL: NEARLY EVERY DAY

## 2017-12-07 NOTE — PROGRESS NOTES
"  SUBJECTIVE:   Mica Roy is a 22 year old female who presents to clinic today for the following health issues:      Depression and Anxiety Follow-Up    Status since last visit: Improved, Pt is doing well now on the Wellbutrin, feels like she has more energy with this med.     Other associated symptoms:None    Complicating factors:     Significant life event: No     Current substance abuse: None    PHQ-9 Score and MyChart F/U Questions 8/30/2016 12/7/2016 10/5/2017   Total Score 15 18 15   Q9: Suicide Ideation Not at all Several days Not at all     CAL-7 SCORE 8/30/2016 12/7/2016 10/5/2017   Total Score - - -   Total Score 12 14 12       PHQ-9  English  PHQ-9   Any Language  GAD7  Suicide Assessment Five-step Evaluation and Treatment (SAFE-T)      Amount of exercise or physical activity: 6-7 days/week for an average of greater than 60 minutes    Problems taking medications regularly: No    Medication side effects: none    Diet: regular (no restrictions)        Problem list and histories reviewed & adjusted, as indicated.  Additional: Patient feels much better on Wellbutrin.  She is not amenable to dose adjustment at this time.  She agrees to follow up in 1 month with ABBI Calderon. PHQ-9 and CAL-7 obtained and reviewed.      ROS:  Constitutional, HEENT, cardiovascular, pulmonary, gi and gu systems are negative, except as otherwise noted.      OBJECTIVE:   /68 (BP Location: Right arm, Patient Position: Sitting, Cuff Size: Adult Small)  Pulse 99  Temp 98.5  F (36.9  C) (Oral)  Ht 5' 2\" (1.575 m)  Wt 125 lb (56.7 kg)  LMP 11/23/2017  SpO2 99%  BMI 22.86 kg/m2  Body mass index is 22.86 kg/(m^2).  GENERAL: healthy, alert and no distress  MS: no gross musculoskeletal defects noted, no edema  SKIN: no suspicious lesions or rashes  PSYCH: Psych: Alert and oriented times 3; coherent speech, normal   rate and volume, able to articulate logical thoughts, able   to abstract reason, no tangential thoughts, no " hallucinations   or delusions  Her affect is appropriate.       Diagnostic Test Results:  none     ASSESSMENT/PLAN:   1. Moderate major depression (H)  - buPROPion (WELLBUTRIN XL) 150 MG 24 hr tablet; Take 1 tablet (150 mg) by mouth every morning  Dispense: 30 tablet; Refill: 1    2. Attention deficit hyperactivity disorder (ADHD), unspecified ADHD type  - buPROPion (WELLBUTRIN XL) 150 MG 24 hr tablet; Take 1 tablet (150 mg) by mouth every morning  Dispense: 30 tablet; Refill: 1  - amphetamine-dextroamphetamine (ADDERALL XR) 10 MG per 24 hr capsule; Take 1 capsule (10 mg) by mouth daily  Dispense: 30 capsule; Refill: 0    Use medication as directed.  Follow up in 1 month  Sooner if needed.  Patient amenable to this follow up plan.     Lottie Her PA-C  [facility]

## 2017-12-07 NOTE — MR AVS SNAPSHOT
"              After Visit Summary   2017    Mica Roy    MRN: [id number]           Patient Information     Date Of Birth          1995        Visit Information        Provider Department      2017 11:40 AM Lottie Her PA-C [facility] [place]        Today's Diagnoses     Moderate major depression (H)    -  1    Attention deficit hyperactivity disorder (ADHD), unspecified ADHD type           Follow-ups after your visit        Who to contact     If you have questions or need follow up information about today's clinic visit or your schedule please contact [facility] directly at [phone].  Normal or non-critical lab and imaging results will be communicated to you by BAASBOXhart, letter or phone within 4 business days after the clinic has received the results. If you do not hear from us within 7 days, please contact the clinic through BAASBOXhart or phone. If you have a critical or abnormal lab result, we will notify you by phone as soon as possible.  Submit refill requests through IPS Group or call your pharmacy and they will forward the refill request to us. Please allow 3 business days for your refill to be completed.          Additional Information About Your Visit        MyChart Information     IPS Group lets you send messages to your doctor, view your test results, renew your prescriptions, schedule appointments and more. To sign up, go to [email or web address] . Click on \"Log in\" on the left side of the screen, which will take you to the Welcome page. Then click on \"Sign up Now\" on the right side of the page.     You will be asked to enter the access code listed below, as well as some personal information. Please follow the directions to create your username and password.     Your access code is: [email or web address]  Expires: 1/3/2018 11:26 AM     Your access code will  in 90 days. If you need help or a new code, please call your [facility] or [phone].      " "  Care EveryWhere ID     This is your Care EveryWhere ID. This could be used by other organizations to access your [facility] medical records  [id number]        Your Vitals Were     Pulse Temperature Height Last Period Pulse Oximetry BMI (Body Mass Index)    99 98.5  F (36.9  C) (Oral) 5' 2\" (1.575 m) 11/23/2017 99% 22.86 kg/m2       Blood Pressure from Last 3 Encounters:   12/07/17 104/68   10/05/17 112/68   02/22/17 110/66    Weight from Last 3 Encounters:   12/07/17 125 lb (56.7 kg)   10/05/17 129 lb (58.5 kg)   02/22/17 132 lb (59.9 kg)              Today, you had the following     No orders found for display         Where to get your medicines      These medications were sent to [facility] - [place], [place] - [address] AT [place] [address]  [address] [place] [address]     Phone:  [phone]     buPROPion 150 MG 24 hr tablet         Some of these will need a paper prescription and others can be bought over the counter.  Ask your nurse if you have questions.     Bring a paper prescription for each of these medications     amphetamine-dextroamphetamine 10 MG per 24 hr capsule          Primary Care Provider Office Phone # Fax #    Karen Weiler, [phone] [address]  [place] [address]        Equal Access to Services     [facility] AH: Hadii john ku hadasho Soomaali, waaxda luqadaha, qaybta kaalmada adeegyada, javi krishnamurthy adechivo shankar. So [facility] [phone].    ATENCIÓN: Si habla español, tiene a mullen disposición servicios gratuitos de asistencia lingüística. Llame al [phone].    We comply with applicable federal civil rights laws and [place] laws. We do not discriminate on the basis of race, color, national origin, age, disability, sex, sexual orientation, or gender identity.            Thank you!     Thank you for choosing [facility]  for your care. Our goal is always to provide you with excellent care. Hearing back " from our patients is one way we can continue to improve our services. Please take a few minutes to complete the written survey that you may receive in the mail after your visit with us. Thank you!             Your Updated Medication List - Protect others around you: Learn how to safely use, store and throw away your medicines at www.disposemymeds.org.          This list is accurate as of: 12/7/17 12:02 PM.  Always use your most recent med list.                   Brand Name Dispense Instructions for use Diagnosis    adapalene 0.1 % cream    DIFFERIN     Apply to pea-size amount to face at bedtime. Start every other night, increase to nightly as tolerated.        amphetamine-dextroamphetamine 10 MG per 24 hr capsule    ADDERALL XR    30 capsule    Take 1 capsule (10 mg) by mouth daily    Attention deficit hyperactivity disorder (ADHD), unspecified ADHD type       benzoyl peroxide 5 % Liqd      Wash face, chest, back daily in shower. Rinse thoroughly.        buPROPion 150 MG 24 hr tablet    WELLBUTRIN XL    30 tablet    Take 1 tablet (150 mg) by mouth every morning    Attention deficit hyperactivity disorder (ADHD), unspecified ADHD type       cetirizine 10 MG tablet    zyrTEC     Take 10 mg by mouth daily        escitalopram 10 MG tablet    LEXAPRO    45 tablet    Take 1.5 tablets (15 mg) by mouth daily    Major depressive disorder, recurrent episode, moderate (H), Generalized anxiety disorder       permethrin 5 % cream    ELIMITE    60 g    Apply cream from head to toe (except the face); leave on for 8-14 hours then wash off with water; reapply in 1 week if live mites appear.    Rash

## 2017-12-07 NOTE — NURSING NOTE
"Chief Complaint   Patient presents with     Recheck Medication     Depression     Anxiety       Initial /68 (BP Location: Right arm, Patient Position: Sitting, Cuff Size: Adult Small)  Pulse 99  Temp 98.5  F (36.9  C) (Oral)  Ht 5' 2\" (1.575 m)  Wt 125 lb (56.7 kg)  LMP 11/23/2017  SpO2 99%  BMI 22.86 kg/m2 Estimated body mass index is 22.86 kg/(m^2) as calculated from the following:    Height as of this encounter: 5' 2\" (1.575 m).    Weight as of this encounter: 125 lb (56.7 kg).  Medication Reconciliation: complete   Rhea Durham MA    "

## 2017-12-08 ASSESSMENT — ANXIETY QUESTIONNAIRES: GAD7 TOTAL SCORE: 17

## 2018-01-11 ENCOUNTER — OFFICE VISIT (OUTPATIENT)
Dept: FAMILY MEDICINE | Facility: CLINIC | Age: 23
End: 2018-01-11
Payer: COMMERCIAL

## 2018-01-11 VITALS
TEMPERATURE: 98.4 F | SYSTOLIC BLOOD PRESSURE: 102 MMHG | HEIGHT: 62 IN | HEART RATE: 99 BPM | BODY MASS INDEX: 23.19 KG/M2 | DIASTOLIC BLOOD PRESSURE: 62 MMHG | OXYGEN SATURATION: 100 % | WEIGHT: 126 LBS

## 2018-01-11 DIAGNOSIS — Z30.011 OCP (ORAL CONTRACEPTIVE PILLS) INITIATION: Primary | ICD-10-CM

## 2018-01-11 DIAGNOSIS — L70.0 ACNE VULGARIS: ICD-10-CM

## 2018-01-11 LAB — BETA HCG QUAL IFA URINE: NEGATIVE

## 2018-01-11 PROCEDURE — 84703 CHORIONIC GONADOTROPIN ASSAY: CPT | Performed by: PHYSICIAN ASSISTANT

## 2018-01-11 PROCEDURE — 99213 OFFICE O/P EST LOW 20 MIN: CPT | Performed by: PHYSICIAN ASSISTANT

## 2018-01-11 RX ORDER — DROSPIRENONE AND ETHINYL ESTRADIOL 0.02-3(28)
1 KIT ORAL DAILY
Qty: 84 TABLET | Refills: 3 | Status: SHIPPED | OUTPATIENT
Start: 2018-01-11 | End: 2018-06-12

## 2018-01-11 NOTE — NURSING NOTE
"Chief Complaint   Patient presents with     Contraception       Initial /62 (BP Location: Right arm, Patient Position: Sitting, Cuff Size: Adult Regular)  Pulse 99  Temp 98.4  F (36.9  C) (Oral)  Ht 5' 2\" (1.575 m)  Wt 126 lb (57.2 kg)  SpO2 100%  BMI 23.05 kg/m2 Estimated body mass index is 23.05 kg/(m^2) as calculated from the following:    Height as of this encounter: 5' 2\" (1.575 m).    Weight as of this encounter: 126 lb (57.2 kg).  Medication Reconciliation: complete   Rhea Durham MA    "

## 2018-01-11 NOTE — MR AVS SNAPSHOT
"              After Visit Summary   2018    Mica Roy    MRN: [id number]           Patient Information     Date Of Birth          1995        Visit Information        Provider Department      2018 10:20 AM Lottie Her PA-C [facility] [place]        Today's Diagnoses     OCP (oral contraceptive pills) initiation    -  1    Acne vulgaris           Follow-ups after your visit        Who to contact     If you have questions or need follow up information about today's clinic visit or your schedule please contact [facility] directly at [phone].  Normal or non-critical lab and imaging results will be communicated to you by iFormularyhart, letter or phone within 4 business days after the clinic has received the results. If you do not hear from us within 7 days, please contact the clinic through iFormularyhart or phone. If you have a critical or abnormal lab result, we will notify you by phone as soon as possible.  Submit refill requests through iPixCel or call your pharmacy and they will forward the refill request to us. Please allow 3 business days for your refill to be completed.          Additional Information About Your Visit        MyChart Information     iPixCel lets you send messages to your doctor, view your test results, renew your prescriptions, schedule appointments and more. To sign up, go to [email or web address] . Click on \"Log in\" on the left side of the screen, which will take you to the Welcome page. Then click on \"Sign up Now\" on the right side of the page.     You will be asked to enter the access code listed below, as well as some personal information. Please follow the directions to create your username and password.     Your access code is: [email or web address]  Expires: 2018 11:18 AM     Your access code will  in 90 days. If you need help or a new code, please call your [facility] or [phone].        Care EveryWhere ID     This is your Care " "EveryWhere ID. This could be used by other organizations to access your [place] medical records  [id number]        Your Vitals Were     Pulse Temperature Height Pulse Oximetry BMI (Body Mass Index)       99 98.4  F (36.9  C) (Oral) 5' 2\" (1.575 m) 100% 23.05 kg/m2        Blood Pressure from Last 3 Encounters:   01/11/18 102/62   12/07/17 104/68   10/05/17 112/68    Weight from Last 3 Encounters:   01/11/18 126 lb (57.2 kg)   12/07/17 125 lb (56.7 kg)   10/05/17 129 lb (58.5 kg)              We Performed the Following     Beta HCG qual IFA urine - [facility] and [place]          Today's Medication Changes          These changes are accurate as of: 1/11/18 11:18 AM.  If you have any questions, ask your nurse or doctor.               Start taking these medicines.        Dose/Directions    drospirenone-ethinyl estradiol 3-0.02 MG per tablet   Commonly known as:  GIRISH   Used for:  OCP (oral contraceptive pills) initiation   Started by:  Lottie Her PA-C        Dose:  1 tablet   Take 1 tablet by mouth daily   Quantity:  84 tablet   Refills:  3            Where to get your medicines      These medications were sent to [facility] - [place], [place] - [address] AT [facility] &  [address] [address]     Phone:  [phone]     drospirenone-ethinyl estradiol 3-0.02 MG per tablet                Primary Care Provider Office Phone # Fax #    Karen Weiler, [phone] [address]  [place] [address]        Equal Access to Services     [facility] AH: Hadii john Hart, waaxda luqadaha, qaybta kaalmada adeegyada, javi shankar. So [facility] [phone].    ATENCIÓN: Si habla español, tiene a mullen disposición servicios gratuitos de asistencia lingüística. Llame al [phone].    We comply with applicable federal civil rights laws and [place] laws. We do not discriminate on the basis of race, color, national origin, " age, disability, sex, sexual orientation, or gender identity.            Thank you!     Thank you for choosing [facility] [place]  for your care. Our goal is always to provide you with excellent care. Hearing back from our patients is one way we can continue to improve our services. Please take a few minutes to complete the written survey that you may receive in the mail after your visit with us. Thank you!             Your Updated Medication List - Protect others around you: Learn how to safely use, store and throw away your medicines at www.disposemymeds.org.          This list is accurate as of: 1/11/18 11:18 AM.  Always use your most recent med list.                   Brand Name Dispense Instructions for use Diagnosis    amphetamine-dextroamphetamine 10 MG per 24 hr capsule    ADDERALL XR    30 capsule    Take 1 capsule (10 mg) by mouth daily    Attention deficit hyperactivity disorder (ADHD), unspecified ADHD type       buPROPion 150 MG 24 hr tablet    WELLBUTRIN XL    30 tablet    Take 1 tablet (150 mg) by mouth every morning    Attention deficit hyperactivity disorder (ADHD), unspecified ADHD type       cetirizine 10 MG tablet    zyrTEC     Take 10 mg by mouth daily        drospirenone-ethinyl estradiol 3-0.02 MG per tablet    GIRISH    84 tablet    Take 1 tablet by mouth daily    OCP (oral contraceptive pills) initiation

## 2018-01-11 NOTE — PROGRESS NOTES
"  SUBJECTIVE:   Mica Roy is a 22 year old female who presents to clinic today for the following health issues:      Concern - Acne  Onset: x years    Description:   Pt was on Birth Control for acne, but stopped it due to side effects x 2 years ago.  Was being seen by a dermotologist but its been a couple of years since she's been seen.    Intensity: No pain but acne's been getting worse for months.    Progression of Symptoms:  worsening    Accompanying Signs & Symptoms:  nothing    Previous history of similar problem:   nothing    Precipitating factors:   Worsened by: nothing    Alleviating factors:  Improved by: nothing.     Therapies Tried and outcome: Only uses Neutrogena Face wash.      Problem list and histories reviewed & adjusted, as indicated.  Additional history: Patient notes that she usually has break-outs with her periods.     ROS:  Constitutional, HEENT, cardiovascular, pulmonary, gi and gu systems are negative, except as otherwise noted.      OBJECTIVE:   /62 (BP Location: Right arm, Patient Position: Sitting, Cuff Size: Adult Regular)  Pulse 99  Temp 98.4  F (36.9  C) (Oral)  Ht 5' 2\" (1.575 m)  Wt 126 lb (57.2 kg)  SpO2 100%  BMI 23.05 kg/m2  Body mass index is 23.05 kg/(m^2).    Total visit time is 15 Minutes with > 12 Minutes spent in care and consultation regarding OCP start with risks and follow up plan.      Diagnostic Test Results:  none     ASSESSMENT/PLAN:   1. OCP (oral contraceptive pills) initiation  - Beta HCG qual IFA urine - [facility] and [place]  - drospirenone-ethinyl estradiol (GIRISH) 3-0.02 MG per tablet; Take 1 tablet by mouth daily  Dispense: 84 tablet; Refill: 3    2. Acne vulgaris  -Continue OTC measures.    Follow up as needed.  Patient amenable to this follow up plan.     Lottie Her PA-C  [facility] [place]  "

## 2018-01-11 NOTE — LETTER
My Depression Action Plan  Name: Mica Roy   Date of Birth 1995  Date: 1/11/2018    My doctor: Weiler, Karen   My clinic: [facility] [place]  [address]  [place] [address] [phone]          GREEN    ZONE   Good Control    What it looks like:     Things are going generally well. You have normal up s and down s. You may even feel depressed from time to time, but bad moods usually last less than a day.   What you need to do:  1. Continue to care for yourself (see self care plan)  2. Check your depression survival kit and update it as needed  3. Follow your physician s recommendations including any medication.  4. Do not stop taking medication unless you consult with your physician first.           YELLOW         ZONE Getting Worse    What it looks like:     Depression is starting to interfere with your life.     It may be hard to get out of bed; you may be starting to isolate yourself from others.    Symptoms of depression are starting to last most all day and this has happened for several days.     You may have suicidal thoughts but they are not constant.   What you need to do:     1. Call your care team, your response to treatment will improve if you keep your care team informed of your progress. Yellow periods are signs an adjustment may need to be made.     2. Continue your self-care, even if you have to fake it!    3. Talk to someone in your support network    4. Open up your depression survival kit           RED    ZONE Medical Alert - Get Help    What it looks like:     Depression is seriously interfering with your life.     You may experience these or other symptoms: You can t get out of bed most days, can t work or engage in other necessary activities, you have trouble taking care of basic hygiene, or basic responsibilities, thoughts of suicide or death that will not go away, self-injurious behavior.     What you need to do:  1. Call your care team and request a  same-day appointment. If they are not available (weekends or after hours) call your local crisis line, emergency room or 911.      Electronically signed by: Rhea Durham, January 11, 2018    Depression Self Care Plan / Survival Kit    Self-Care for Depression  Here s the deal. Your body and mind are really not as separate as most people think.  What you do and think affects how you feel and how you feel influences what you do and think. This means if you do things that people who feel good do, it will help you feel better.  Sometimes this is all it takes.  There is also a place for medication and therapy depending on how severe your depression is, so be sure to consult with your medical provider and/ or Behavioral Health Consultant if your symptoms are worsening or not improving.     In order to better manage my stress, I will:    Exercise  Get some form of exercise, every day. This will help reduce pain and release endorphins, the  feel good  chemicals in your brain. This is almost as good as taking antidepressants!  This is not the same as joining a gym and then never going! (they count on that by the way ) It can be as simple as just going for a walk or doing some gardening, anything that will get you moving.      Hygiene   Maintain good hygiene (Get out of bed in the morning, Make your bed, Brush your teeth, Take a shower, and Get dressed like you were going to work, even if you are unemployed).  If your clothes don't fit try to get ones that do.    Diet  I will strive to eat foods that are good for me, drink plenty of water, and avoid excessive sugar, caffeine, alcohol, and other mood-altering substances.  Some foods that are helpful in depression are: complex carbohydrates, B vitamins, flaxseed, fish or fish oil, fresh fruits and vegetables.    Psychotherapy  I agree to participate in Individual Therapy (if recommended).    Medication  If prescribed medications, I agree to take them.  Missing doses can  result in serious side effects.  I understand that drinking alcohol, or other illicit drug use, may cause potential side effects.  I will not stop my medication abruptly without first discussing it with my provider.    Staying Connected With Others  I will stay in touch with my friends, family members, and my primary care provider/team.    Use your imagination  Be creative.  We all have a creative side; it doesn t matter if it s oil painting, sand castles, or mud pies! This will also kick up the endorphins.    Witness Beauty  (AKA stop and smell the roses) Take a look outside, even in mid-winter. Notice colors, textures. Watch the squirrels and birds.     Service to others  Be of service to others.  There is always someone else in need.  By helping others we can  get out of ourselves  and remember the really important things.  This also provides opportunities for practicing all the other parts of the program.    Humor  Laugh and be silly!  Adjust your TV habits for less news and crime-drama and more comedy.    Control your stress  Try breathing deep, massage therapy, biofeedback, and meditation. Find time to relax each day.     My support system    Clinic Contact:  Phone number:    Contact 1:  Phone number:    Contact 2:  Phone number:    Mandaeism/:  Phone number:    Therapist:  Phone number:    Local crisis center:    Phone number:    Other community support:  Phone number:

## 2018-02-02 ENCOUNTER — TELEPHONE (OUTPATIENT)
Dept: FAMILY MEDICINE | Facility: CLINIC | Age: 23
End: 2018-02-02

## 2018-03-20 ENCOUNTER — OFFICE VISIT (OUTPATIENT)
Dept: FAMILY MEDICINE | Facility: CLINIC | Age: 23
End: 2018-03-20
Payer: COMMERCIAL

## 2018-03-20 VITALS
HEIGHT: 62 IN | OXYGEN SATURATION: 100 % | BODY MASS INDEX: 23.19 KG/M2 | TEMPERATURE: 99.2 F | SYSTOLIC BLOOD PRESSURE: 96 MMHG | DIASTOLIC BLOOD PRESSURE: 64 MMHG | HEART RATE: 97 BPM | WEIGHT: 126 LBS

## 2018-03-20 DIAGNOSIS — J02.9 SORE THROAT: Primary | ICD-10-CM

## 2018-03-20 LAB
DEPRECATED S PYO AG THROAT QL EIA: NORMAL
SPECIMEN SOURCE: NORMAL

## 2018-03-20 PROCEDURE — 99213 OFFICE O/P EST LOW 20 MIN: CPT | Performed by: FAMILY MEDICINE

## 2018-03-20 PROCEDURE — 87081 CULTURE SCREEN ONLY: CPT | Performed by: FAMILY MEDICINE

## 2018-03-20 PROCEDURE — 87880 STREP A ASSAY W/OPTIC: CPT | Performed by: FAMILY MEDICINE

## 2018-03-20 NOTE — PROGRESS NOTES
"  SUBJECTIVE:   Mica Roy is a 22 year old female who presents to clinic today for the following health issues:    Acute Illness   Acute illness concerns: Sore throat  Onset: 3 days    Fever: no    Chills/Sweats: no    Headache (location?): no     Sinus Pressure:no    Conjunctivitis:  no    Ear Pain: YES: left    Rhinorrhea: YES    Congestion: YES    Sore Throat: YES     Cough: no    Wheeze: no    Decreased Appetite: no    Nausea: no    Vomiting: no    Diarrhea:  no    Dysuria/Freq.: no    Fatigue/Achiness: YES- very tired     Sick/Strep Exposure: YES- roommates      Therapies Tried and outcome: ibuprofen  And takes a daily zyrtec     Pt has had sore throat for the past 3 days along with left ear pain, rhinorrhea, congestion, and fatigue. She notes her roommates have been sick as well. Pt is unsure what sickness her roommates have. Pt has not had a tonsillectomy.        Problem list and histories reviewed & adjusted, as indicated.  Additional history: as documented    Reviewed and updated as needed this visit by clinical staff  Allergies  Meds  Problems       Reviewed and updated as needed this visit by Provider  Allergies  Meds  Problems       ROS:  Constitutional, HEENT, cardiovascular, pulmonary, gi and gu systems are negative, except as otherwise noted.    This document serves as a record of the services and decisions personally performed and made by Chan Kay MD. It was created on his behalf by Ronaldo Oliver, a trained medical scribe. The creation of this document is based on the provider's statements to the medical scribe.  Ronaldo Oliver 4:52 PM March 20, 2018    OBJECTIVE:     BP 96/64  Pulse 97  Temp 99.2  F (37.3  C) (Oral)  Ht 1.575 m (5' 2\")  Wt 57.2 kg (126 lb)  SpO2 100%  BMI 23.05 kg/m2  Body mass index is 23.05 kg/(m^2).  GENERAL: healthy, alert and no distress  HENT: Throat noted to be erythematous. Ear canals and TM's normal, nose and mouth without ulcers or " lesions  NECK: no adenopathy, no asymmetry, masses, or scars and thyroid normal to palpation  RESP: lungs clear to auscultation - no rales, rhonchi or wheezes  CV: regular rate and rhythm, normal S1 S2, no S3 or S4, no murmur, click or rub, no peripheral edema and peripheral pulses strong  LYMPH: no cervical, supraclavicular, axillary, or inguinal adenopathy    Diagnostic Test Results:  Results for orders placed or performed in visit on 03/20/18 (from the past 24 hour(s))   Strep, Rapid Screen   Result Value Ref Range    Specimen Description Throat     Rapid Strep A Screen       NEGATIVE: No Group A streptococcal antigen detected by immunoassay, await culture report.     ASSESSMENT/PLAN:     (J02.9) Sore throat  (primary encounter diagnosis)  Comment: Her Rapid Strep screening was negative today. Advised pt that viral infection is more likely than bacterial infection. Discussed with pt that viral infection usually takes 10-14 days to run its course; encouraged frequent rest, pushing fluids and antipyretics for fever control.  Plan: Strep, Rapid Screen, Beta strep group A culture        Follow up if symptoms persist after 14 days, sooner if they worsen.    The information in this document, created by the medical scribe for me, accurately reflects the services I personally performed and the decisions made by me. I have reviewed and approved this document for accuracy prior to leaving the patient care area.  March 20, 2018 4:52 PM    Chan Kay Jr, MD  [facility]

## 2018-03-20 NOTE — MR AVS SNAPSHOT
"              After Visit Summary   3/20/2018    Mica Roy    MRN: [id number]           Patient Information     Date Of Birth          1995        Visit Information        Provider Department      3/20/2018 4:40 PM Chan Kay Jr., MD [facility] [place]        Today's Diagnoses     Sore throat    -  1       Follow-ups after your visit        Follow-up notes from your care team     Return in about 12 days (around 2018), or if symptoms worsen or fail to improve.      Who to contact     If you have questions or need follow up information about today's clinic visit or your schedule please contact [facility] [place] directly at [phone].  Normal or non-critical lab and imaging results will be communicated to you by MyChart, letter or phone within 4 business days after the clinic has received the results. If you do not hear from us within 7 days, please contact the clinic through MyChart or phone. If you have a critical or abnormal lab result, we will notify you by phone as soon as possible.  Submit refill requests through Informantonline or call your pharmacy and they will forward the refill request to us. Please allow 3 business days for your refill to be completed.          Additional Information About Your Visit        MyChart Information     Informantonline lets you send messages to your doctor, view your test results, renew your prescriptions, schedule appointments and more. To sign up, go to [email or web address] . Click on \"Log in\" on the left side of the screen, which will take you to the Welcome page. Then click on \"Sign up Now\" on the right side of the page.     You will be asked to enter the access code listed below, as well as some personal information. Please follow the directions to create your username and password.     Your access code is: [email or web address]  Expires: 2018 12:18 PM     Your access code will  in 90 days. If you need help or a new code, please call your [place] " "clinic or [phone].        Care EveryWhere ID     This is your Care EveryWhere ID. This could be used by other organizations to access your [place] medical records  [id number]        Your Vitals Were     Pulse Temperature Height Pulse Oximetry BMI (Body Mass Index)       97 99.2  F (37.3  C) (Oral) 5' 2\" (1.575 m) 100% 23.05 kg/m2        Blood Pressure from Last 3 Encounters:   03/20/18 96/64   01/11/18 102/62   12/07/17 104/68    Weight from Last 3 Encounters:   03/20/18 126 lb (57.2 kg)   01/11/18 126 lb (57.2 kg)   12/07/17 125 lb (56.7 kg)              We Performed the Following     Beta strep group A culture     Strep, Rapid Screen        Primary Care Provider Office Phone # Fax #    [facility] [phone] [address]  [facility] [address]        Equal Access to Services     TAZ SALAS : Hadii aad ku hadasho Soomaali, waaxda luqadaha, qaybta kaalmada adeegyada, waxay idiin haybhavanan kecia shetty . So [facility] [phone].    ATENCIÓN: Si habla español, tiene a mullen disposición servicios gratuitos de asistencia lingüística. Llame al [phone].    We comply with applicable federal civil rights laws and [place] laws. We do not discriminate on the basis of race, color, national origin, age, disability, sex, sexual orientation, or gender identity.            Thank you!     Thank you for choosing [facility]  for your care. Our goal is always to provide you with excellent care. Hearing back from our patients is one way we can continue to improve our services. Please take a few minutes to complete the written survey that you may receive in the mail after your visit with us. Thank you!             Your Updated Medication List - Protect others around you: Learn how to safely use, store and throw away your medicines at www.disposemymeds.org.          This list is accurate as of 3/20/18  5:05 PM.  Always use your most recent med list.                   Brand Name " Dispense Instructions for use Diagnosis    amphetamine-dextroamphetamine 10 MG per 24 hr capsule    ADDERALL XR    30 capsule    Take 1 capsule (10 mg) by mouth daily    Attention deficit hyperactivity disorder (ADHD), unspecified ADHD type       buPROPion 150 MG 24 hr tablet    WELLBUTRIN XL    30 tablet    Take 1 tablet (150 mg) by mouth every morning    Attention deficit hyperactivity disorder (ADHD), unspecified ADHD type       cetirizine 10 MG tablet    zyrTEC     Take 10 mg by mouth daily        drospirenone-ethinyl estradiol 3-0.02 MG per tablet    GIRISH    84 tablet    Take 1 tablet by mouth daily    OCP (oral contraceptive pills) initiation

## 2018-03-20 NOTE — NURSING NOTE
"Chief Complaint   Patient presents with     Pharyngitis       Initial BP 96/64  Pulse 97  Temp 99.2  F (37.3  C) (Oral)  Ht 5' 2\" (1.575 m)  Wt 126 lb (57.2 kg)  SpO2 100%  BMI 23.05 kg/m2 Estimated body mass index is 23.05 kg/(m^2) as calculated from the following:    Height as of this encounter: 5' 2\" (1.575 m).    Weight as of this encounter: 126 lb (57.2 kg).  Medication Reconciliation: complete  "

## 2018-03-20 NOTE — LETTER
March 22, 2018      Mica Roy  [address] [facility] [address]        Dear ,    We are writing to inform you of your test results.    -All of your labs are normal.  Strep culture is negative.     If you have further questions about the interpretation of your labs, [email or web address] is a good website to check out for further information.     Resulted Orders   Strep, Rapid Screen   Result Value Ref Range    Specimen Description Throat     Rapid Strep A Screen       NEGATIVE: No Group A streptococcal antigen detected by immunoassay, await culture report.   Beta strep group A culture   Result Value Ref Range    Specimen Description Throat     Culture Micro No beta hemolytic Streptococcus Group A isolated        If you have any questions or concerns, please call the clinic at the number listed above.       Sincerely,        Chan Kay Jr, MD

## 2018-03-21 LAB
BACTERIA SPEC CULT: NORMAL
SPECIMEN SOURCE: NORMAL

## 2018-03-21 NOTE — PROGRESS NOTES
Please send the following letter:    Ms. Roy,    -All of your labs are normal.  Strep culture is negative.    If you have further questions about the interpretation of your labs, [email or web address] is a good website to check out for further information.      Please contact the clinic if you have additional questions.  Thank you.    Sincerely,    Chan Kay MD

## 2018-04-03 ENCOUNTER — OFFICE VISIT (OUTPATIENT)
Dept: FAMILY MEDICINE | Facility: CLINIC | Age: 23
End: 2018-04-03
Payer: COMMERCIAL

## 2018-04-03 VITALS
WEIGHT: 126 LBS | OXYGEN SATURATION: 100 % | DIASTOLIC BLOOD PRESSURE: 68 MMHG | BODY MASS INDEX: 23.19 KG/M2 | SYSTOLIC BLOOD PRESSURE: 112 MMHG | HEIGHT: 62 IN | TEMPERATURE: 98.7 F | HEART RATE: 113 BPM

## 2018-04-03 DIAGNOSIS — R30.0 DYSURIA: Primary | ICD-10-CM

## 2018-04-03 LAB
ALBUMIN UR-MCNC: NEGATIVE MG/DL
APPEARANCE UR: CLEAR
BILIRUB UR QL STRIP: NEGATIVE
COLOR UR AUTO: NORMAL
GLUCOSE UR STRIP-MCNC: NEGATIVE MG/DL
HGB UR QL STRIP: NEGATIVE
KETONES UR STRIP-MCNC: NEGATIVE MG/DL
LEUKOCYTE ESTERASE UR QL STRIP: NEGATIVE
NITRATE UR QL: NEGATIVE
PH UR STRIP: 7 PH (ref 5–7)
SOURCE: NORMAL
SP GR UR STRIP: 1.01 (ref 1–1.03)
UROBILINOGEN UR STRIP-ACNC: 0.2 EU/DL (ref 0.2–1)

## 2018-04-03 PROCEDURE — 87591 N.GONORRHOEAE DNA AMP PROB: CPT | Performed by: FAMILY MEDICINE

## 2018-04-03 PROCEDURE — 99213 OFFICE O/P EST LOW 20 MIN: CPT | Performed by: FAMILY MEDICINE

## 2018-04-03 PROCEDURE — 87491 CHLMYD TRACH DNA AMP PROBE: CPT | Performed by: FAMILY MEDICINE

## 2018-04-03 PROCEDURE — 81003 URINALYSIS AUTO W/O SCOPE: CPT | Performed by: FAMILY MEDICINE

## 2018-04-03 NOTE — MR AVS SNAPSHOT
"              After Visit Summary   4/3/2018    Mica Roy    MRN: [id number]           Patient Information     Date Of Birth          1995        Visit Information        Provider Department      4/3/2018 11:00 AM Awais Lynn MD [facility]        Today's Diagnoses     Dysuria    -  1       Follow-ups after your visit        Who to contact     If you have questions or need follow up information about today's clinic visit or your schedule please contact [facility] [place] directly at [phone].  Normal or non-critical lab and imaging results will be communicated to you by MyChart, letter or phone within 4 business days after the clinic has received the results. If you do not hear from us within 7 days, please contact the clinic through Niwahart or phone. If you have a critical or abnormal lab result, we will notify you by phone as soon as possible.  Submit refill requests through PolyRemedy or call your pharmacy and they will forward the refill request to us. Please allow 3 business days for your refill to be completed.          Additional Information About Your Visit        MyChart Information     PolyRemedy lets you send messages to your doctor, view your test results, renew your prescriptions, schedule appointments and more. To sign up, go to [email or web address] . Click on \"Log in\" on the left side of the screen, which will take you to the Welcome page. Then click on \"Sign up Now\" on the right side of the page.     You will be asked to enter the access code listed below, as well as some personal information. Please follow the directions to create your username and password.     Your access code is: [email or web address]  Expires: 2018 12:18 PM     Your access code will  in 90 days. If you need help or a new code, please call your [facility] or [phone].        Care EveryWhere ID     This is your Care EveryWhere ID. This could be used by other organizations to access " "your [place] medical records  [id number]        Your Vitals Were     Pulse Temperature Height Pulse Oximetry BMI (Body Mass Index)       113 98.7  F (37.1  C) (Oral) 5' 2\" (1.575 m) 100% 23.05 kg/m2        Blood Pressure from Last 3 Encounters:   04/03/18 112/68   03/20/18 96/64   01/11/18 102/62    Weight from Last 3 Encounters:   04/03/18 126 lb (57.2 kg)   03/20/18 126 lb (57.2 kg)   01/11/18 126 lb (57.2 kg)              We Performed the Following     *UA reflex to Microscopic and Culture ([place] and [facility] (except [place] and [place])        Primary Care Provider Office Phone # Fax #    [facility] [phone] [address]  [facility] [address]        Equal Access to Services     [facility] Hospital[facility]YA : Hadii aad ku hadasho Sojohn, waaxda luqadaha, qaybta kaalmada adeegyada, javi poolin hayaan kecia shetty . So [facility] [phone].    ATENCIÓN: Si habla español, tiene a mullen disposición servicios gratuitos de asistencia lingüística. Maverick al [phone].    We comply with applicable federal civil rights laws and [place] laws. We do not discriminate on the basis of race, color, national origin, age, disability, sex, sexual orientation, or gender identity.            Thank you!     Thank you for choosing [facility]  for your care. Our goal is always to provide you with excellent care. Hearing back from our patients is one way we can continue to improve our services. Please take a few minutes to complete the written survey that you may receive in the mail after your visit with us. Thank you!             Your Updated Medication List - Protect others around you: Learn how to safely use, store and throw away your medicines at www.disposemymeds.org.          This list is accurate as of 4/3/18 11:11 AM.  Always use your most recent med list.                   Brand Name Dispense Instructions for use Diagnosis    amphetamine-dextroamphetamine 10 MG per 24 hr " capsule    ADDERALL XR    30 capsule    Take 1 capsule (10 mg) by mouth daily    Attention deficit hyperactivity disorder (ADHD), unspecified ADHD type       buPROPion 150 MG 24 hr tablet    WELLBUTRIN XL    30 tablet    Take 1 tablet (150 mg) by mouth every morning    Attention deficit hyperactivity disorder (ADHD), unspecified ADHD type       cetirizine 10 MG tablet    zyrTEC     Take 10 mg by mouth daily        drospirenone-ethinyl estradiol 3-0.02 MG per tablet    GIRISH    84 tablet    Take 1 tablet by mouth daily    OCP (oral contraceptive pills) initiation

## 2018-04-03 NOTE — PROGRESS NOTES
"  SUBJECTIVE:   Mica Roy is a 22 year old female who presents to clinic today for the following health issues:      URINARY TRACT SYMPTOMS  Onset: 2 days     Description:   Painful urination (Dysuria): frequency and fells like bladder is not emptying    Blood in urine (Hematuria): no   Delay in urine (Hesitency): no     Intensity: mild    Progression of Symptoms:  intermittent    Accompanying Signs & Symptoms:  Fever/chills: no   Flank pain no   Nausea and vomiting: no   Any vaginal symptoms: none  Abdominal/Pelvic Pain: YES- LLQ    History:   History of frequent UTI's: no but frequent Sx of   History of kidney stones: no   Sexually Active: YES  Possibility of pregnancy: No +  New partner    Therapies Tried and outcome: none    LMP ~3/24.   Normal timing and duration.      ROS:  General, neuro, sleep, psych, musculoskeletal system otherwise negative.    /68  Pulse 113  Temp 98.7  F (37.1  C) (Oral)  Ht 5' 2\" (1.575 m)  Wt 126 lb (57.2 kg)  SpO2 100%  BMI 23.05 kg/m2     GENERAL: healthy, alert and no distress  EYES: Eyes grossly normal to inspection, PERRL and conjunctivae and sclerae normal  RESP: lungs clear to auscultation - no rales, rhonchi or wheezes  CV: regular rate and rhythm, normal S1 S2, no S3 or S4, no murmur, click or rub, no peripheral edema and peripheral pulses strong  : declined    ASSESSMENT:  1. Dysuria  UA neg   Other tests recommended   Only wanted GC CHL    Use condoms     - *UA reflex to Microscopic and Culture ([place] and [facility] (except [place] and [place])  - NEISSERIA GONORRHOEA PCR  - CHLAMYDIA TRACHOMATIS PCR     Pt instructed to come back to the clinic for worsening sx    "

## 2018-04-03 NOTE — NURSING NOTE
"Chief Complaint   Patient presents with     UTI       Initial /68  Pulse 113  Temp 98.7  F (37.1  C) (Oral)  Ht 5' 2\" (1.575 m)  Wt 126 lb (57.2 kg)  SpO2 100%  BMI 23.05 kg/m2 Estimated body mass index is 23.05 kg/(m^2) as calculated from the following:    Height as of this encounter: 5' 2\" (1.575 m).    Weight as of this encounter: 126 lb (57.2 kg).  Medication Reconciliation: complete  "

## 2018-04-04 DIAGNOSIS — F33.1 MAJOR DEPRESSIVE DISORDER, RECURRENT EPISODE, MODERATE (H): ICD-10-CM

## 2018-04-04 DIAGNOSIS — F90.9 ATTENTION DEFICIT HYPERACTIVITY DISORDER (ADHD), UNSPECIFIED ADHD TYPE: ICD-10-CM

## 2018-04-04 DIAGNOSIS — F41.1 GENERALIZED ANXIETY DISORDER: ICD-10-CM

## 2018-04-04 LAB
C TRACH DNA SPEC QL NAA+PROBE: NEGATIVE
N GONORRHOEA DNA SPEC QL NAA+PROBE: NEGATIVE
SPECIMEN SOURCE: NORMAL
SPECIMEN SOURCE: NORMAL

## 2018-04-04 NOTE — TELEPHONE ENCOUNTER
"Requested Prescriptions   Pending Prescriptions Disp Refills     escitalopram (LEXAPRO) 10 MG tablet [Pharmacy Med Name: ESCITALOPRAM 10MG TABLETS]  Discontinued  Last Written Prescription Date:  3/15/2017  Last Fill Quantity: 45 tablet,  # refills: 0   Last office visit: 3/20/2018 with prescribing provider:  Rahul   Future Office Visit:        Routing refill request to provider for review/approval because:  Drug not active on patient's medication list       45 tablet 0     Sig: TAKE 1 AND 1/2 TABLETS(15 MG) BY MOUTH DAILY    SSRIs Protocol Failed    4/4/2018  3:49 PM       Failed - PHQ-9 score less than 5 in past 6 months    Please review last PHQ-9 score.     PHQ-9 SCORE 10/5/2017 12/7/2017 4/4/2018   Total Score - - -   Total Score 15 10 4     CAL-7 SCORE 12/7/2016 10/5/2017 12/7/2017   Total Score - - -   Total Score 14 12 17          Passed - Patient is age 18 or older       Passed - No active pregnancy on record       Passed - No positive pregnancy test in last 12 months       Passed - Recent (6 mo) or future (30 days) visit within the authorizing provider's specialty    Patient had office visit in the last 6 months or has a visit in the next 30 days with authorizing provider or within the authorizing provider's specialty.  See \"Patient Info\" tab in inbasket, or \"Choose Columns\" in Meds & Orders section of the refill encounter.              "

## 2018-04-04 NOTE — TELEPHONE ENCOUNTER
"Requested Prescriptions   Pending Prescriptions Disp Refills     buPROPion (WELLBUTRIN XL) 150 MG 24 hr tablet [Pharmacy Med Name: BUPROPION XL 150MG TABLETS (24 H)]    Last Written Prescription Date:  12/7/17  Last Fill Quantity: 30,  # refills: 1   Last Office Visit with G, P or [facility] prescribing provider:  4/3/18   Future Office Visit:      30 tablet 0     Sig: TAKE 1 TABLET(150 MG) BY MOUTH EVERY MORNING    SSRIs Protocol Passed    4/4/2018  3:49 PM       Passed - Recent (12 mo) or future (30 days) visit within the authorizing provider's specialty    Patient had office visit in the last 12 months or has a visit in the next 30 days with authorizing provider or within the authorizing provider's specialty.  See \"Patient Info\" tab in inbasket, or \"Choose Columns\" in Meds & Orders section of the refill encounter.           Passed - Medication is Bupropion    If the medication is Bupropion (Wellbutrin), and the patient is taking for smoking cessation; OK to refill.         Passed - Patient is age 18 or older       Passed - No active pregnancy on record       Passed - No positive pregnancy test in last 12 months              Oliverio Faarax  [facility]  "

## 2018-04-05 RX ORDER — ESCITALOPRAM OXALATE 10 MG/1
TABLET ORAL
Qty: 45 TABLET | Refills: 0 | OUTPATIENT
Start: 2018-04-05

## 2018-04-05 ASSESSMENT — PATIENT HEALTH QUESTIONNAIRE - PHQ9: SUM OF ALL RESPONSES TO PHQ QUESTIONS 1-9: 4

## 2018-04-05 NOTE — TELEPHONE ENCOUNTER
Patient no longer on this medication per documentation in chart. Message sent to pharmacy.  Luzmaria Pena, RN, BSN  [facility] [place]

## 2018-04-10 RX ORDER — BUPROPION HYDROCHLORIDE 150 MG/1
TABLET ORAL
Qty: 30 TABLET | Refills: 0 | Status: SHIPPED | OUTPATIENT
Start: 2018-04-10 | End: 2018-06-07

## 2018-04-10 NOTE — TELEPHONE ENCOUNTER
Prescription approved per [facility] Refill Protocol.  Katiuska Tompkins RN    PHQ-9 SCORE 10/5/2017 12/7/2017 4/4/2018   Total Score - - -   Total Score 15 10 4

## 2018-04-17 ENCOUNTER — OFFICE VISIT (OUTPATIENT)
Dept: FAMILY MEDICINE | Facility: CLINIC | Age: 23
End: 2018-04-17
Payer: COMMERCIAL

## 2018-04-17 VITALS
SYSTOLIC BLOOD PRESSURE: 98 MMHG | HEART RATE: 119 BPM | HEIGHT: 62 IN | OXYGEN SATURATION: 100 % | BODY MASS INDEX: 23.19 KG/M2 | TEMPERATURE: 99 F | WEIGHT: 126 LBS | DIASTOLIC BLOOD PRESSURE: 70 MMHG

## 2018-04-17 DIAGNOSIS — F90.9 ATTENTION DEFICIT HYPERACTIVITY DISORDER (ADHD), UNSPECIFIED ADHD TYPE: Primary | ICD-10-CM

## 2018-04-17 DIAGNOSIS — R35.0 URINARY FREQUENCY: ICD-10-CM

## 2018-04-17 DIAGNOSIS — Z79.899 CONTROLLED SUBSTANCE AGREEMENT SIGNED: ICD-10-CM

## 2018-04-17 LAB — HBA1C MFR BLD: 5 % (ref 0–6.4)

## 2018-04-17 PROCEDURE — 99214 OFFICE O/P EST MOD 30 MIN: CPT | Performed by: FAMILY MEDICINE

## 2018-04-17 PROCEDURE — 83036 HEMOGLOBIN GLYCOSYLATED A1C: CPT | Performed by: FAMILY MEDICINE

## 2018-04-17 PROCEDURE — 36415 COLL VENOUS BLD VENIPUNCTURE: CPT | Performed by: FAMILY MEDICINE

## 2018-04-17 RX ORDER — DEXTROAMPHETAMINE SACCHARATE, AMPHETAMINE ASPARTATE MONOHYDRATE, DEXTROAMPHETAMINE SULFATE AND AMPHETAMINE SULFATE 2.5; 2.5; 2.5; 2.5 MG/1; MG/1; MG/1; MG/1
10 CAPSULE, EXTENDED RELEASE ORAL DAILY
Qty: 30 CAPSULE | Refills: 0 | Status: SHIPPED | OUTPATIENT
Start: 2018-04-17 | End: 2018-06-12

## 2018-04-17 NOTE — MR AVS SNAPSHOT
After Visit Summary   4/17/2018    Mica Roy    MRN: [id number]           Patient Information     Date Of Birth          1995        Visit Information        Provider Department      4/17/2018 3:20 PM Raji Baker,  [facility] [place]        Today's Diagnoses     Attention deficit hyperactivity disorder (ADHD), unspecified ADHD type    -  1    Controlled substance agreement signed        Urinary frequency           Follow-ups after your visit        Additional Services     UROLOGY ADULT REFERRAL       Your provider has referred you to: [facility]: [facility] [phone]   [email or web address]    Please be aware that coverage of these services is subject to the terms and limitations of your health insurance plan.  Call member services at your health plan with any benefit or coverage questions.      Please bring the following with you to your appointment:    (1) Any X-Rays, CTs or MRIs which have been performed.  Contact the facility where they were done to arrange for  prior to your scheduled appointment.    (2) List of current medications  (3) This referral request   (4) Any documents/labs given to you for this referral                  Follow-up notes from your care team     Return in about 6 months (around 10/17/2018) for ADD follow up.      Who to contact     If you have questions or need follow up information about today's clinic visit or your schedule please contact [facility] [place] directly at [phone].  Normal or non-critical lab and imaging results will be communicated to you by MyChart, letter or phone within 4 business days after the clinic has received the results. If you do not hear from us within 7 days, please contact the clinic through MyChart or phone. If you have a critical or abnormal lab result, we will notify you by phone as soon as possible.  Submit refill  "requests through Mobimedia or call your pharmacy and they will forward the refill request to us. Please allow 3 business days for your refill to be completed.          Additional Information About Your Visit        [email or web address] Information     Mobimedia gives you secure access to your electronic health record. If you see a primary care provider, you can also send messages to your care team and make appointments. If you have questions, please call your primary care clinic.  If you do not have a primary care provider, please call [phone] and they will assist you.        Care EveryWhere ID     This is your Care EveryWhere ID. This could be used by other organizations to access your [place] medical records  [id number]        Your Vitals Were     Pulse Temperature Height Last Period Pulse Oximetry BMI (Body Mass Index)    119 99  F (37.2  C) (Oral) 5' 2\" (1.575 m) 03/30/2018 100% 23.05 kg/m2       Blood Pressure from Last 3 Encounters:   04/17/18 98/70   04/03/18 112/68   03/20/18 96/64    Weight from Last 3 Encounters:   04/17/18 126 lb (57.2 kg)   04/03/18 126 lb (57.2 kg)   03/20/18 126 lb (57.2 kg)              We Performed the Following     Hemoglobin A1c     UROLOGY ADULT REFERRAL          Today's Medication Changes          These changes are accurate as of 4/17/18  3:40 PM.  If you have any questions, ask your nurse or doctor.               These medicines have changed or have updated prescriptions.        Dose/Directions    * amphetamine-dextroamphetamine 10 MG per 24 hr capsule   Commonly known as:  ADDERALL XR   This may have changed:  Another medication with the same name was added. Make sure you understand how and when to take each.   Used for:  Attention deficit hyperactivity disorder (ADHD), unspecified ADHD type   Changed by:  Raji Baker DO        Dose:  10 mg   Take 1 capsule (10 mg) by mouth daily   Quantity:  30 capsule   Refills:  0       * amphetamine-dextroamphetamine 10 MG per 24 hr capsule "   Commonly known as:  ADDERALL XR   This may have changed:  You were already taking a medication with the same name, and this prescription was added. Make sure you understand how and when to take each.   Used for:  Attention deficit hyperactivity disorder (ADHD), unspecified ADHD type   Changed by:  Raji Baker DO        Dose:  10 mg   Take 1 capsule (10 mg) by mouth daily   Quantity:  30 capsule   Refills:  0       * Notice:  This list has 2 medication(s) that are the same as other medications prescribed for you. Read the directions carefully, and ask your doctor or other care provider to review them with you.         Where to get your medicines      Some of these will need a paper prescription and others can be bought over the counter.  Ask your nurse if you have questions.     Bring a paper prescription for each of these medications     amphetamine-dextroamphetamine 10 MG per 24 hr capsule                Primary Care Provider Office Phone # Fax #    [facility] [phone] [address]  [place] [address]        Equal Access to Services     BALBINA SALAS : Hadii aad ku hadasho Soomaali, waaxda luqadaha, qaybta kaalmada adeegyada, javi shetty . So [facility] [phone].    ATENCIÓN: Si habla español, tiene a mullen disposición servicios gratuitos de asistencia lingüística. Llame al [phone].    We comply with applicable federal civil rights laws and [place] laws. We do not discriminate on the basis of race, color, national origin, age, disability, sex, sexual orientation, or gender identity.            Thank you!     Thank you for choosing [facility]  for your care. Our goal is always to provide you with excellent care. Hearing back from our patients is one way we can continue to improve our services. Please take a few minutes to complete the written survey that you may receive in the mail after your visit with us. Thank you!              Your Updated Medication List - Protect others around you: Learn how to safely use, store and throw away your medicines at www.disposemymeds.org.          This list is accurate as of 4/17/18  3:40 PM.  Always use your most recent med list.                   Brand Name Dispense Instructions for use Diagnosis    * amphetamine-dextroamphetamine 10 MG per 24 hr capsule    ADDERALL XR    30 capsule    Take 1 capsule (10 mg) by mouth daily    Attention deficit hyperactivity disorder (ADHD), unspecified ADHD type       * amphetamine-dextroamphetamine 10 MG per 24 hr capsule    ADDERALL XR    30 capsule    Take 1 capsule (10 mg) by mouth daily    Attention deficit hyperactivity disorder (ADHD), unspecified ADHD type       buPROPion 150 MG 24 hr tablet    WELLBUTRIN XL    30 tablet    TAKE 1 TABLET(150 MG) BY MOUTH EVERY MORNING    Attention deficit hyperactivity disorder (ADHD), unspecified ADHD type       cetirizine 10 MG tablet    zyrTEC     Take 10 mg by mouth daily        drospirenone-ethinyl estradiol 3-0.02 MG per tablet    GIRISH    84 tablet    Take 1 tablet by mouth daily    OCP (oral contraceptive pills) initiation       * Notice:  This list has 2 medication(s) that are the same as other medications prescribed for you. Read the directions carefully, and ask your doctor or other care provider to review them with you.

## 2018-04-17 NOTE — LETTER
[facility]    04/17/18    Patient: Mica Roy  YOB: 1995  Medical Record Number: [id number]                                                                  Controlled Substance Agreement  I understand that my care provider has prescribed controlled substances (narcotics, tranquilizers, and/or stimulants) to help manage my condition(s).  I am taking this medicine to help me function or work.  I know that this is strong medicine.  It could have serious side effects and even cause a dependency on the drug.  If I stop these medicines suddenly, I could have severe withdrawal symptoms.    The risks, benefits, and side effects of these medication(s) were explained to me.  I agree that:  1. I will take part in other treatments as advised by my provider.  This may be psychiatry or counseling, physical therapy, behavioral therapy, group treatment, or a referral to a pain clinic.  I will reduce or stop my medicine when my provider tells me to do so.   2. I will take my medicines as prescribed.  I will not change the dose or schedule unless my provider tells me to.  There will be no refills if I  run out early.   I may be contacted at any time without warning and asked to complete a drug test or pill count.   3. I will keep all my appointments at the clinic.  If I miss appointments or fail to follow instructions, my provider may stop my medicine.  4. I will not ask other providers to prescribe controlled substances. And I will not accept controlled substances from other people. If I need another prescribed controlled substance for a new reason, I will notify my provider within one business day.  5. If I enroll in the [place] Medical Marijuana program, I will tell my provider.  I will also sign an agreement to share my medical records with my provider.  6. I will use one pharmacy to fill all of my controlled substance prescriptions.  If my prescription is mailed to my pharmacy, it may take 5 to  7 days for my medicine to be ready.  7. I understand that my provider, clinic care team, and pharmacy can track controlled substance prescriptions from other providers through a central database (prescription monitoring program).  8. I will bring in my list of medications (or my medicine bottles) each time I come to the clinic.  REV-  04/2016                                                                                                                                            Page 1 of 2      [facility] [place]    04/17/18    Patient: Mica Roy  YOB: 1995  Medical Record Number: [id number]    9. Refills of controlled substances will be made only during office hours.  It is up to me to make sure that I do not run out of my medicines on weekends or holidays.    10. I am responsible for my prescriptions.  If the medicine is lost or stolen, it will not be replaced.   I also agree not to share these medicines with anyone.  11. I agree to not use ANY illegal or recreational drugs.  This includes marijuana, cocaine, bath salts or other drugs.  I agree not to use alcohol unless my provider says I may.  I agree to give urine samples whenever asked.  If I fail to give a urine sample, the provider may stop my medicine.     12. I will tell my nurse or provider right away if I become pregnant or have a new medical problem treated outside of [facility].  13. I understand that this medicine can affect my thinking and judgment.  It may be unsafe for me to drive, use machinery and do dangerous tasks.  I will not do any of these things until I know how the medicine affects me.  If my dose changes, I will wait to see how it affects me.  I will contact my provider if I have concerns about medicine side effects.  I understand that if I do not follow any of the conditions above, my prescriptions or treatment may be stopped.    I agree that my provider, clinic care team, and pharmacy may work with any city,  state or federal law enforcement agency that investigates the misuse, sale, or other diversion of my controlled medicine. I will allow my provider to discuss my care with or share a copy of this agreement with any other treating provider, pharmacy or emergency room where I receive care.  I agree to give up (waive) any right of privacy or confidentiality with respect to these authorizations.   I have read this agreement and have asked questions about anything I did not understand.   ___________________________________    ___________________________  Patient Signature                                                           Date and Time  ___________________________________     ____________________________  Witness                                                                            Date and Time  ___________________________________  Raji Baker DO  REV-  04/2016                                                                                                                                                                 Page 2 of 2

## 2018-04-17 NOTE — PROGRESS NOTES
SUBJECTIVE:   Mica Roy is a 22 year old female who presents to clinic today for the following health issues:     Establish care with Dr. Raji Baker, DO       Medication Followup of Adderall XR 10 MG     Taking Medication as prescribed: NO-skipping days due to appetite and staying awake     Not taking on the weekend     Side Effects:  Appetite and insomnia if takes later in the evening/     Medication Helping Symptoms:  yes     Referral requested for urologist   Frequent urination X4 years. She states she has been seen in the past for this and told it was due to caffeine intake. She is interested in seeing a specialist. She states she urinates almost every hour and feels like it is large volume of urine. She has frequent thirst as well. Denies any history of diabetes or abnormal blood sugars. Denies any dysuria, hematuria.    Problem list and histories reviewed & adjusted, as indicated.  Additional history: as documented    Patient Active Problem List   Diagnosis     Achilles tendonitis     Knee pain     Moderate major depression (H)     Generalized anxiety disorder     Attention deficit hyperactivity disorder (ADHD)     Past Surgical History:   Procedure Laterality Date     NO HISTORY OF SURGERY         Social History   Substance Use Topics     Smoking status: Never Smoker     Smokeless tobacco: Never Used     Alcohol use No     Family History   Problem Relation Age of Onset     Depression Mother      Hypertension Mother      OSTEOPOROSIS Mother      DIABETES No family hx of      Coronary Artery Disease No family hx of      Hyperlipidemia No family hx of      CEREBROVASCULAR DISEASE No family hx of      Breast Cancer No family hx of      Colon Cancer No family hx of      Other Cancer No family hx of      Thyroid Disease No family hx of            Reviewed and updated as needed this visit by clinical staff  Tobacco  Allergies  Meds  Problems  Med Hx  Surg Hx  Fam Hx  Soc Hx        Reviewed and  "updated as needed this visit by Provider  Allergies  Meds  Problems         ROS:  Constitutional, HEENT, cardiovascular, pulmonary, gi and gu systems are negative, except as otherwise noted.    OBJECTIVE:     BP 98/70  Pulse 119  Temp 99  F (37.2  C) (Oral)  Ht 5' 2\" (1.575 m)  Wt 126 lb (57.2 kg)  LMP 03/30/2018  SpO2 100%  BMI 23.05 kg/m2  Body mass index is 23.05 kg/(m^2).  GENERAL: healthy, alert and no distress  RESP: lungs clear to auscultation - no rales, rhonchi or wheezes  CV: regular rate and rhythm, normal S1 S2, no S3 or S4, no murmur, click or rub, no peripheral edema and peripheral pulses strong  ABDOMEN: soft, nontender, no hepatosplenomegaly, no masses and bowel sounds normal  MS: no gross musculoskeletal defects noted, no edema  PSYCH: mentation appears normal, affect normal/bright    Diagnostic Test Results:  none     ASSESSMENT/PLAN:   1. Attention deficit hyperactivity disorder (ADHD), unspecified ADHD type: symptoms well controlled on current regimen. Will continue. CSA signed today.  - amphetamine-dextroamphetamine (ADDERALL XR) 10 MG per 24 hr capsule; Take 1 capsule (10 mg) by mouth daily  Dispense: 30 capsule; Refill: 0    2. Controlled substance agreement signed: for Adderall XR for treatment of ADHD.    3. Urinary frequency: with ongoing frequency, will check a1c to rule out diabetes. Symptoms consistent with overactive bladder. Will refer to urology for further evaluation.  - Hemoglobin A1c  - UROLOGY ADULT REFERRAL      Raji Baker DO  [facility]  "

## 2018-04-17 NOTE — NURSING NOTE
"Chief Complaint   Patient presents with     A.D.H.D     Establish Care       Initial BP 98/70  Pulse 119  Temp 99  F (37.2  C) (Oral)  Ht 5' 2\" (1.575 m)  Wt 126 lb (57.2 kg)  LMP 03/30/2018  SpO2 100%  BMI 23.05 kg/m2 Estimated body mass index is 23.05 kg/(m^2) as calculated from the following:    Height as of this encounter: 5' 2\" (1.575 m).    Weight as of this encounter: 126 lb (57.2 kg).  Medication Reconciliation: complete   Agustina Byoce Certified Medical Assistant    "

## 2018-04-19 ENCOUNTER — OFFICE VISIT (OUTPATIENT)
Dept: UROLOGY | Facility: CLINIC | Age: 23
End: 2018-04-19
Payer: COMMERCIAL

## 2018-04-19 VITALS — HEART RATE: 102 BPM | DIASTOLIC BLOOD PRESSURE: 79 MMHG | OXYGEN SATURATION: 100 % | SYSTOLIC BLOOD PRESSURE: 112 MMHG

## 2018-04-19 DIAGNOSIS — N32.81 OAB (OVERACTIVE BLADDER): ICD-10-CM

## 2018-04-19 DIAGNOSIS — R30.0 DYSURIA: Primary | ICD-10-CM

## 2018-04-19 LAB
ALBUMIN UR-MCNC: 30 MG/DL
APPEARANCE UR: CLEAR
BACTERIA #/AREA URNS HPF: ABNORMAL /HPF
BILIRUB UR QL STRIP: ABNORMAL
COLOR UR AUTO: YELLOW
GLUCOSE UR STRIP-MCNC: NEGATIVE MG/DL
HGB UR QL STRIP: ABNORMAL
KETONES UR STRIP-MCNC: 80 MG/DL
LEUKOCYTE ESTERASE UR QL STRIP: NEGATIVE
MUCOUS THREADS #/AREA URNS LPF: PRESENT /LPF
NITRATE UR QL: NEGATIVE
PH UR STRIP: 6 PH (ref 5–7)
RBC #/AREA URNS AUTO: ABNORMAL /HPF
SOURCE: ABNORMAL
SP GR UR STRIP: 1.02 (ref 1–1.03)
UROBILINOGEN UR STRIP-ACNC: 1 EU/DL (ref 0.2–1)
WBC #/AREA URNS AUTO: ABNORMAL /HPF

## 2018-04-19 PROCEDURE — 52000 CYSTOURETHROSCOPY: CPT | Performed by: UROLOGY

## 2018-04-19 PROCEDURE — 81001 URINALYSIS AUTO W/SCOPE: CPT | Performed by: UROLOGY

## 2018-04-19 PROCEDURE — 99205 OFFICE O/P NEW HI 60 MIN: CPT | Mod: 25 | Performed by: UROLOGY

## 2018-04-19 RX ORDER — TOLTERODINE 4 MG/1
4 CAPSULE, EXTENDED RELEASE ORAL DAILY
Qty: 30 CAPSULE | Refills: 1 | Status: SHIPPED | OUTPATIENT
Start: 2018-04-19 | End: 2018-12-05

## 2018-04-19 NOTE — PROGRESS NOTES
"Mica Roy is a 22 year old female seen in consultation for urinary frequency. Consult from Clinic, [place].    Several urologic concerns today:  1. Urinary frequency, about q hr during the day, noc x 2, large quantities  2. Feeling that drinking any fluid >> \"It just goes right through me; I don't even absorb it\"  3. Urine has new odor  4. Uncertain if she empties to completion  5. Lower abdominal discomfort  6. Urinary urgency      \"I feel that I always have to be drinking or else I get dehydrated.\"      Denies dysuria, gross hematuria, UTI's, prior  eval, hx bladder surgery, use of bladder meds. Never pregnant.    Denies constipation.    Reviewed medical hx in detail including ADHD, major depression, anxiety.    Drinks the following every day: one caf coffee, 5-6 tea, 6-7 Craig.    Works as a Personal Care Asistant, studying to become a physician.    (Did not complete voiding diary)      Past Medical History:   Diagnosis Date     CAL (generalised anxiety disorder)      Moderate major depression (H)        Past Surgical History:   Procedure Laterality Date     NO HISTORY OF SURGERY         Social History     Social History     Marital status: Single     Spouse name: N/A     Number of children: N/A     Years of education: N/A     Occupational History     Not on file.     Social History Main Topics     Smoking status: Never Smoker     Smokeless tobacco: Never Used     Alcohol use No     Drug use: No     Sexual activity: Yes     Partners: Male     Birth control/ protection: Pill     Other Topics Concern     Not on file     Social History Narrative       Current Outpatient Prescriptions   Medication Sig Dispense Refill     amphetamine-dextroamphetamine (ADDERALL XR) 10 MG per 24 hr capsule Take 1 capsule (10 mg) by mouth daily 30 capsule 0     amphetamine-dextroamphetamine (ADDERALL XR) 10 MG per 24 hr capsule Take 1 capsule (10 mg) by mouth daily 30 capsule 0     buPROPion (WELLBUTRIN XL) 150 MG 24 hr " tablet TAKE 1 TABLET(150 MG) BY MOUTH EVERY MORNING 30 tablet 0     cetirizine (ZYRTEC) 10 MG tablet Take 10 mg by mouth daily       drospirenone-ethinyl estradiol (GIRISH) 3-0.02 MG per tablet Take 1 tablet by mouth daily 84 tablet 3       Physical Exam:    GENL: NAD.    ABD: Soft, non-tender, no masses.    EG: Well-estrogenized, no masses.    VAGINA: Well-estrogenized, no masses.    BN HYPERMOBILITY: None.    CYSTOCELE: None.    APICAL PROLAPSE: None.    RECTOCELE: None.    BIMANUAL: No mass or tenderness.      Cysto:    (Informed consent obtained. Pause for cause performed)   Sterile prep.    17 Fr scope inserted through urethra. Systematic examination w 70 degree lens.   PVR: Nil   MUCOSA: Normal without lesion   ORIFICES: Normal location and morphology   CAPACITY: 275 cc; no pain with filling   Scope withdrawn without untoward effect.    (Pt tolerated procedure without difficulty).        Results for orders placed or performed in visit on 04/19/18   UA reflex to Microscopic   Result Value Ref Range    Color Urine Yellow     Appearance Urine Clear     Glucose Urine Negative NEG^Negative mg/dL    Bilirubin Urine Small (A) NEG^Negative    Ketones Urine 80 (A) NEG^Negative mg/dL    Specific Gravity Urine 1.025 1.003 - 1.035    Blood Urine Trace (A) NEG^Negative    pH Urine 6.0 5.0 - 7.0 pH    Protein Albumin Urine 30 (A) NEG^Negative mg/dL    Urobilinogen Urine 1.0 0.2 - 1.0 EU/dL    Nitrite Urine Negative NEG^Negative    Leukocyte Esterase Urine Negative NEG^Negative    Source Midstream Urine    Urine Microscopic   Result Value Ref Range    WBC Urine 0 - 5 OTO5^0 - 5 /HPF    RBC Urine 2-5 (A) OTO2^O - 2 /HPF    Bacteria Urine Moderate (A) NEG^Negative /HPF    Mucous Urine Present (A) NEG^Negative /LPF         IMP:  1. OAB dry; massive fluids, modest bladder capacity      PLAN:  1. Discussed situation with patient in detail.  2. Consider moderation of fluids, ab tea and caffeine; pt expresses understanding  3.  Consider trial Detrol LA4; discussed rationale, mech of action, potential side effect; pt elects trial  4. RTC 2 mos to review progress  5. Set realistic expectations  6. 60 minutes spent with patient, more than 50% spent in counseling and coordination of care for OAB.  7. Copy

## 2018-04-19 NOTE — MR AVS SNAPSHOT
After Visit Summary   4/19/2018    Mica Roy    MRN: [id number]           Patient Information     Date Of Birth          1995        Visit Information        Provider Department      4/19/2018 7:45 AM Hilton Lundy MD [facility]        Today's Diagnoses     Dysuria    -  1    OAB (overactive bladder)           Follow-ups after your visit        Follow-up notes from your care team     Return in about 2 months (around 6/19/2018).      Your next 10 appointments already scheduled     Jun 20, 2018  9:00 AM CDT   Return Visit with Hilton Lundy MD   [facility] [place] ([facility])    [address] [place] [address] [phone]              Who to contact     If you have questions or need follow up information about today's clinic visit or your schedule please contact [facility] directly at [phone].  Normal or non-critical lab and imaging results will be communicated to you by MyChart, letter or phone within 4 business days after the clinic has received the results. If you do not hear from us within 7 days, please contact the clinic through [employer] or phone. If you have a critical or abnormal lab result, we will notify you by phone as soon as possible.  Submit refill requests through [employer] or call your pharmacy and they will forward the refill request to us. Please allow 3 business days for your refill to be completed.          Additional Information About Your Visit        MyChart Information     [employer] gives you secure access to your electronic health record. If you see a primary care provider, you can also send messages to your care team and make appointments. If you have questions, please call your primary care clinic.  If you do not have a primary care provider, please call [phone] and they will assist you.        Care EveryWhere ID     This is your Care EveryWhere ID. This could be used by other  organizations to access your [place] medical records  [id number]        Your Vitals Were     Pulse Last Period Pulse Oximetry             102 03/30/2018 100%          Blood Pressure from Last 3 Encounters:   04/19/18 112/79   04/17/18 98/70   04/03/18 112/68    Weight from Last 3 Encounters:   04/17/18 57.2 kg (126 lb)   04/03/18 57.2 kg (126 lb)   03/20/18 57.2 kg (126 lb)              We Performed the Following     CYSTOURETHROSCOPY (56550)     UA reflex to Microscopic     Urine Microscopic          Today's Medication Changes          These changes are accurate as of 4/19/18  8:56 AM.  If you have any questions, ask your nurse or doctor.               Start taking these medicines.        Dose/Directions    tolterodine 4 MG 24 hr capsule   Commonly known as:  DETROL LA   Used for:  OAB (overactive bladder)   Started by:  Hilton Lundy MD        Dose:  4 mg   Take 1 capsule (4 mg) by mouth daily   Quantity:  30 capsule   Refills:  1            Where to get your medicines      These medications were sent to [id number] - [place], [place] - [address] AT [place] [address]  [address], [place] [address]     Phone:  [phone]     tolterodine 4 MG 24 hr capsule                Primary Care Provider Office Phone # Fax #    [facility] [phone] [address]  [place] [address]        Equal Access to Services     BALBINA SALAS AH: Hadii aad ku hadasho Soomaali, waaxda luqadaha, qaybta kaalmada adeegyada, waxstacy jaziel krishnamurthy adrianachivo shankar. So [facility] [phone].    ATENCIÓN: Si habla español, tiene a mullen disposición servicios gratuitos de asistencia lingüística. Llame al [phone].    We comply with applicable federal civil rights laws and [place] laws. We do not discriminate on the basis of race, color, national origin, age, disability, sex, sexual orientation, or gender identity.            Thank you!     Thank you for choosing [facility]  for your care. Our goal is always to provide you with excellent care. Hearing back from our patients is one way we can continue to improve our services. Please take a few minutes to complete the written survey that you may receive in the mail after your visit with us. Thank you!             Your Updated Medication List - Protect others around you: Learn how to safely use, store and throw away your medicines at www.disposemymeds.org.          This list is accurate as of 4/19/18  8:56 AM.  Always use your most recent med list.                   Brand Name Dispense Instructions for use Diagnosis    * amphetamine-dextroamphetamine 10 MG per 24 hr capsule    ADDERALL XR    30 capsule    Take 1 capsule (10 mg) by mouth daily    Attention deficit hyperactivity disorder (ADHD), unspecified ADHD type       * amphetamine-dextroamphetamine 10 MG per 24 hr capsule    ADDERALL XR    30 capsule    Take 1 capsule (10 mg) by mouth daily    Attention deficit hyperactivity disorder (ADHD), unspecified ADHD type       buPROPion 150 MG 24 hr tablet    WELLBUTRIN XL    30 tablet    TAKE 1 TABLET(150 MG) BY MOUTH EVERY MORNING    Attention deficit hyperactivity disorder (ADHD), unspecified ADHD type       cetirizine 10 MG tablet    zyrTEC     Take 10 mg by mouth daily        drospirenone-ethinyl estradiol 3-0.02 MG per tablet    GIRISH    84 tablet    Take 1 tablet by mouth daily    OCP (oral contraceptive pills) initiation       tolterodine 4 MG 24 hr capsule    DETROL LA    30 capsule    Take 1 capsule (4 mg) by mouth daily    OAB (overactive bladder)       * Notice:  This list has 2 medication(s) that are the same as other medications prescribed for you. Read the directions carefully, and ask your doctor or other care provider to review them with you.

## 2018-06-07 DIAGNOSIS — F90.9 ATTENTION DEFICIT HYPERACTIVITY DISORDER (ADHD), UNSPECIFIED ADHD TYPE: ICD-10-CM

## 2018-06-07 NOTE — TELEPHONE ENCOUNTER
"Requested Prescriptions   Pending Prescriptions Disp Refills     buPROPion (WELLBUTRIN XL) 150 MG 24 hr tablet [Pharmacy Med Name: BUPROPION XL 150MG TABLETS (24 H)]  Last Written Prescription Date:  04/10/18  Last Fill Quantity: 30,  # refills: 0   Last Office Visit with [facility], [facility] or [facility] prescribing provider:  04/17/18   Future Office Visit:    Next 5 appointments (look out 90 days)     Jun 12, 2018  8:40 AM CDT   [facility]    [address]  [facility] [address]   [phone]            Jun 20, 2018  9:00 AM CDT   Return Visit with Hilton Lundy MD   [facility] ([address]  [place] [address]   [phone]                30 tablet 0     Sig: TAKE 1 TABLET BY MOUTH EVERY MORNING    SSRIs Protocol Passed    6/7/2018  8:47 AM       Passed - Recent (12 mo) or future (30 days) visit within the authorizing provider's specialty    Patient had office visit in the last 12 months or has a visit in the next 30 days with authorizing provider or within the authorizing provider's specialty.  See \"Patient Info\" tab in inbasket, or \"Choose Columns\" in Meds & Orders section of the refill encounter.           Passed - Medication is Bupropion    If the medication is Bupropion (Wellbutrin), and the patient is taking for smoking cessation; OK to refill.         Passed - Patient is age 18 or older       Passed - No active pregnancy on record       Passed - No positive pregnancy test in last 12 months          "

## 2018-06-08 ENCOUNTER — TELEPHONE (OUTPATIENT)
Dept: FAMILY MEDICINE | Facility: CLINIC | Age: 23
End: 2018-06-08

## 2018-06-08 RX ORDER — BUPROPION HYDROCHLORIDE 150 MG/1
TABLET ORAL
Qty: 30 TABLET | Refills: 1 | Status: SHIPPED | OUTPATIENT
Start: 2018-06-08 | End: 2018-06-12

## 2018-06-08 NOTE — TELEPHONE ENCOUNTER
Prescription approved per [facility] Refill Protocol.  Savana Dominguez RN

## 2018-06-08 NOTE — TELEPHONE ENCOUNTER
Calling to get details   Left message for her to call triage back  Alanna Bill RN- Triage [employer]

## 2018-06-08 NOTE — TELEPHONE ENCOUNTER
Reason for Call: Request for an order or referral:    Order or referral being requested: The patient is calling requesting a TB Gold test. There are no orders in for this.    Date needed: as soon as possible    Phone number Patient can be reached at:  Other phone number:  [phone]    Best Time:  Anytime    Can we leave a detailed message on this number?  YES    Call taken on 6/8/2018 at 1:53 PM by Anabella Correia

## 2018-06-08 NOTE — TELEPHONE ENCOUNTER
I spoke with her and she realized that she got it down at another clinic  Closing encounter  Alanna Bill RN- Triage [employer]

## 2018-06-12 ENCOUNTER — OFFICE VISIT (OUTPATIENT)
Dept: FAMILY MEDICINE | Facility: CLINIC | Age: 23
End: 2018-06-12
Payer: COMMERCIAL

## 2018-06-12 VITALS
DIASTOLIC BLOOD PRESSURE: 76 MMHG | OXYGEN SATURATION: 100 % | HEIGHT: 62 IN | SYSTOLIC BLOOD PRESSURE: 104 MMHG | WEIGHT: 133 LBS | TEMPERATURE: 98.6 F | HEART RATE: 98 BPM | BODY MASS INDEX: 24.48 KG/M2

## 2018-06-12 DIAGNOSIS — Z30.011 OCP (ORAL CONTRACEPTIVE PILLS) INITIATION: Primary | ICD-10-CM

## 2018-06-12 DIAGNOSIS — J30.2 SEASONAL ALLERGIC RHINITIS, UNSPECIFIED CHRONICITY, UNSPECIFIED TRIGGER: ICD-10-CM

## 2018-06-12 DIAGNOSIS — F32.1 MODERATE MAJOR DEPRESSION (H): ICD-10-CM

## 2018-06-12 DIAGNOSIS — F41.1 GENERALIZED ANXIETY DISORDER: ICD-10-CM

## 2018-06-12 DIAGNOSIS — F90.9 ATTENTION DEFICIT HYPERACTIVITY DISORDER (ADHD), UNSPECIFIED ADHD TYPE: ICD-10-CM

## 2018-06-12 PROCEDURE — 99214 OFFICE O/P EST MOD 30 MIN: CPT | Performed by: FAMILY MEDICINE

## 2018-06-12 RX ORDER — DEXTROAMPHETAMINE SACCHARATE, AMPHETAMINE ASPARTATE MONOHYDRATE, DEXTROAMPHETAMINE SULFATE AND AMPHETAMINE SULFATE 2.5; 2.5; 2.5; 2.5 MG/1; MG/1; MG/1; MG/1
10 CAPSULE, EXTENDED RELEASE ORAL DAILY
Qty: 30 CAPSULE | Refills: 0 | Status: SHIPPED | OUTPATIENT
Start: 2018-06-12 | End: 2018-12-05

## 2018-06-12 RX ORDER — DROSPIRENONE AND ETHINYL ESTRADIOL 0.02-3(28)
1 KIT ORAL DAILY
Qty: 84 TABLET | Refills: 3 | Status: SHIPPED | OUTPATIENT
Start: 2018-06-12 | End: 2020-10-15

## 2018-06-12 RX ORDER — CETIRIZINE HYDROCHLORIDE 10 MG/1
10 TABLET ORAL DAILY
Qty: 90 TABLET | Refills: 1 | Status: SHIPPED | OUTPATIENT
Start: 2018-06-12 | End: 2019-05-23

## 2018-06-12 RX ORDER — ESCITALOPRAM OXALATE 10 MG/1
10 TABLET ORAL DAILY
Qty: 30 TABLET | Refills: 1 | Status: SHIPPED | OUTPATIENT
Start: 2018-06-12 | End: 2018-12-05

## 2018-06-12 ASSESSMENT — PATIENT HEALTH QUESTIONNAIRE - PHQ9: 5. POOR APPETITE OR OVEREATING: NOT AT ALL

## 2018-06-12 ASSESSMENT — ANXIETY QUESTIONNAIRES
5. BEING SO RESTLESS THAT IT IS HARD TO SIT STILL: NOT AT ALL
1. FEELING NERVOUS, ANXIOUS, OR ON EDGE: MORE THAN HALF THE DAYS
GAD7 TOTAL SCORE: 12
IF YOU CHECKED OFF ANY PROBLEMS ON THIS QUESTIONNAIRE, HOW DIFFICULT HAVE THESE PROBLEMS MADE IT FOR YOU TO DO YOUR WORK, TAKE CARE OF THINGS AT HOME, OR GET ALONG WITH OTHER PEOPLE: SOMEWHAT DIFFICULT
7. FEELING AFRAID AS IF SOMETHING AWFUL MIGHT HAPPEN: MORE THAN HALF THE DAYS
2. NOT BEING ABLE TO STOP OR CONTROL WORRYING: NEARLY EVERY DAY
3. WORRYING TOO MUCH ABOUT DIFFERENT THINGS: NEARLY EVERY DAY
6. BECOMING EASILY ANNOYED OR IRRITABLE: MORE THAN HALF THE DAYS

## 2018-06-12 NOTE — PROGRESS NOTES
SUBJECTIVE:   Mica Roy is a 22 year old female who presents to clinic today for the following health issues:      Depression Followup    Status since last visit: no change on Wellbutrin     See PHQ-9 for current symptoms.  Other associated symptoms: None    Complicating factors:   Significant life event:  No   Current substance abuse:  None  Anxiety or Panic symptoms:  No    PHQ-9 12/7/2017 4/4/2018 6/12/2018   Total Score 10 4 12   Q9: Suicide Ideation Several days Not at all More than half the days     In the past two weeks have you had thoughts of suicide or self-harm?  No.    Do you have concerns about your personal safety or the safety of others?   No  PHQ-9  English  PHQ-9   Any Language  Suicide Assessment Five-step Evaluation and Treatment (SAFE-T)    Amount of exercise or physical activity: 2-3 days/week for an average of 30-45 minutes    Problems taking medications regularly: No    Medication side effects: YES - not working     Diet: regular (no restrictions)    Had been on Lexapro in the past which was helpful. But stopped when felt better and then when restarted, maybe made her feel sick. She would like to restart this medication and give it another chance since it had been helpful    Medication Followup of Adderall     Taking Medication as prescribed: Not taking every day     Side Effects:  Insomnia at times but denies any weight change, change in appetite, diaphoresis, palpitations, or tics     Medication Helping Symptoms:  yes       Needs refill of birth control. LMP currently.      Problem list and histories reviewed & adjusted, as indicated.  Additional history: as documented    Patient Active Problem List   Diagnosis     Achilles tendonitis     Knee pain     Moderate major depression (H)     Generalized anxiety disorder     Attention deficit hyperactivity disorder (ADHD)     Past Surgical History:   Procedure Laterality Date     NO HISTORY OF SURGERY         Social History   Substance Use  "Topics     Smoking status: Never Smoker     Smokeless tobacco: Never Used     Alcohol use No     Family History   Problem Relation Age of Onset     Depression Mother      Hypertension Mother      Osteoperosis Mother      Diabetes No family hx of      Coronary Artery Disease No family hx of      Hyperlipidemia No family hx of      Cerebrovascular Disease No family hx of      Breast Cancer No family hx of      Colon Cancer No family hx of      Other Cancer No family hx of      Thyroid Disease No family hx of            Reviewed and updated as needed this visit by clinical staff  Tobacco  Allergies  Meds  Problems  Med Hx  Surg Hx  Fam Hx  Soc Hx        Reviewed and updated as needed this visit by Provider  Allergies  Meds  Problems         ROS:  Constitutional, HEENT, cardiovascular, pulmonary, gi and gu systems are negative, except as otherwise noted.    OBJECTIVE:     /76 (BP Location: Right arm, Patient Position: Chair, Cuff Size: Adult Regular)  Pulse 98  Temp 98.6  F (37  C) (Oral)  Ht 5' 2\" (1.575 m)  Wt 133 lb (60.3 kg)  SpO2 100%  BMI 24.33 kg/m2  Body mass index is 24.33 kg/(m^2).  GENERAL: healthy, alert and no distress  RESP: lungs clear to auscultation - no rales, rhonchi or wheezes  CV: regular rate and rhythm, normal S1 S2, no S3 or S4, no murmur, click or rub, no peripheral edema and peripheral pulses strong    Diagnostic Test Results:  none     ASSESSMENT/PLAN:   1. OCP (oral contraceptive pills) initiation: refilled OCP today.  - drospirenone-ethinyl estradiol (GIRISH) 3-0.02 MG per tablet; Take 1 tablet by mouth daily  Dispense: 84 tablet; Refill: 3    2. Attention deficit hyperactivity disorder (ADHD), unspecified ADHD type: symptoms stable, refill medication. Follow up every 3 months  - amphetamine-dextroamphetamine (ADDERALL XR) 10 MG per 24 hr capsule; Take 1 capsule (10 mg) by mouth daily  Dispense: 30 capsule; Refill: 0    3. Moderate major depression (H): symptoms worse and " no help with Wellbutrin. As Lexapro had been helpful in the past, will restart this today. Follow up in 1 month to see how mood is on Lexapro.  - escitalopram (LEXAPRO) 10 MG tablet; Take 1 tablet (10 mg) by mouth daily  Dispense: 30 tablet; Refill: 1    4. Generalized anxiety disorder: as above  - escitalopram (LEXAPRO) 10 MG tablet; Take 1 tablet (10 mg) by mouth daily  Dispense: 30 tablet; Refill: 1    5. Seasonal allergic rhinitis, unspecified chronicity, unspecified trigger  - cetirizine (ZYRTEC) 10 MG tablet; Take 1 tablet (10 mg) by mouth daily  Dispense: 90 tablet; Refill: 1  .202      Raji Baker,   [facility]

## 2018-06-12 NOTE — MR AVS SNAPSHOT
After Visit Summary   6/12/2018    Mica Roy    MRN: [id number]           Patient Information     Date Of Birth          1995        Visit Information        Provider Department      6/12/2018 8:40 AM Raji Baker DO [facility]        Today's Diagnoses     OCP (oral contraceptive pills) initiation    -  1    Attention deficit hyperactivity disorder (ADHD), unspecified ADHD type        Moderate major depression (H)        Generalized anxiety disorder        Seasonal allergic rhinitis, unspecified chronicity, unspecified trigger           Follow-ups after your visit        Follow-up notes from your care team     Return in about 1 month (around 7/12/2018) for follow up depression/anxiety.      Your next 10 appointments already scheduled     Jun 20, 2018  9:00 AM CDT   Return Visit with Hilton Lundy MD   [facility] [place] ([address]  [place] [id number] [phone]              Who to contact     If you have questions or need follow up information about today's clinic visit or your schedule please contact [facility] [place] directly at [phone].  Normal or non-critical lab and imaging results will be communicated to you by MyChart, letter or phone within 4 business days after the clinic has received the results. If you do not hear from us within 7 days, please contact the clinic through MyChart or phone. If you have a critical or abnormal lab result, we will notify you by phone as soon as possible.  Submit refill requests through Hickies or call your pharmacy and they will forward the refill request to us. Please allow 3 business days for your refill to be completed.          Additional Information About Your Visit        MyChart Information     Hickies gives you secure access to your electronic health record. If you see a primary care provider, you can also send messages to your care team and make  "appointments. If you have questions, please call your primary care clinic.  If you do not have a primary care provider, please call [phone] and they will assist you.        Care EveryWhere ID     This is your Care EveryWhere ID. This could be used by other organizations to access your [place] medical records  [id number]        Your Vitals Were     Pulse Temperature Height Pulse Oximetry BMI (Body Mass Index)       98 98.6  F (37  C) (Oral) 5' 2\" (1.575 m) 100% 24.33 kg/m2        Blood Pressure from Last 3 Encounters:   06/12/18 104/76   04/19/18 112/79   04/17/18 98/70    Weight from Last 3 Encounters:   06/12/18 133 lb (60.3 kg)   04/17/18 126 lb (57.2 kg)   04/03/18 126 lb (57.2 kg)              Today, you had the following     No orders found for display         Today's Medication Changes          These changes are accurate as of 6/12/18  9:08 AM.  If you have any questions, ask your nurse or doctor.               Start taking these medicines.        Dose/Directions    escitalopram 10 MG tablet   Commonly known as:  LEXAPRO   Used for:  Moderate major depression (H), Attention deficit hyperactivity disorder (ADHD), unspecified ADHD type   Started by:  Raji Baker DO        Dose:  10 mg   Take 1 tablet (10 mg) by mouth daily   Quantity:  30 tablet   Refills:  1         Stop taking these medicines if you haven't already. Please contact your care team if you have questions.     buPROPion 150 MG 24 hr tablet   Commonly known as:  WELLBUTRIN XL   Stopped by:  Raji Baker DO                Where to get your medicines      These medications were sent to [id number] - [place], [place] - [address] AT [facility] [address]  [address] [place] [address]     Phone:  [phone]     cetirizine 10 MG tablet    drospirenone-ethinyl estradiol 3-0.02 MG per tablet    escitalopram 10 MG tablet         Some of these will need a paper prescription and others can be bought over the " counter.  Ask your nurse if you have questions.     Bring a paper prescription for each of these medications     amphetamine-dextroamphetamine 10 MG per 24 hr capsule                Primary Care Provider Office Phone # Fax #    [facility] [phone] [address]  [facility] [address]        Equal Access to Services     TAZ SALAS : Hadii aad ku hadasho Soomaali, waaxda luqadaha, qaybta kaalmada adeegyada, waxay yrnin hayaan adechivo quinteroyolisusama shankar. So [facility] [phone].    ATENCIÓN: Si habla español, tiene a mullen disposición servicios gratuitos de asistencia lingüística. Llame al [phone].    We comply with applicable federal civil rights laws and [place] laws. We do not discriminate on the basis of race, color, national origin, age, disability, sex, sexual orientation, or gender identity.            Thank you!     Thank you for choosing [facility]  for your care. Our goal is always to provide you with excellent care. Hearing back from our patients is one way we can continue to improve our services. Please take a few minutes to complete the written survey that you may receive in the mail after your visit with us. Thank you!             Your Updated Medication List - Protect others around you: Learn how to safely use, store and throw away your medicines at www.disposemymeds.org.          This list is accurate as of 6/12/18  9:08 AM.  Always use your most recent med list.                   Brand Name Dispense Instructions for use Diagnosis    amphetamine-dextroamphetamine 10 MG per 24 hr capsule    ADDERALL XR    30 capsule    Take 1 capsule (10 mg) by mouth daily    Attention deficit hyperactivity disorder (ADHD), unspecified ADHD type       cetirizine 10 MG tablet    zyrTEC    90 tablet    Take 1 tablet (10 mg) by mouth daily    Seasonal allergic rhinitis, unspecified chronicity, unspecified trigger       drospirenone-ethinyl estradiol 3-0.02 MG per tablet    GIRISH     84 tablet    Take 1 tablet by mouth daily    OCP (oral contraceptive pills) initiation       escitalopram 10 MG tablet    LEXAPRO    30 tablet    Take 1 tablet (10 mg) by mouth daily    Moderate major depression (H), Attention deficit hyperactivity disorder (ADHD), unspecified ADHD type       tolterodine 4 MG 24 hr capsule    DETROL LA    30 capsule    Take 1 capsule (4 mg) by mouth daily    OAB (overactive bladder)

## 2018-06-13 ENCOUNTER — OFFICE VISIT (OUTPATIENT)
Dept: FAMILY MEDICINE | Facility: CLINIC | Age: 23
End: 2018-06-13
Payer: COMMERCIAL

## 2018-06-13 VITALS
SYSTOLIC BLOOD PRESSURE: 106 MMHG | HEART RATE: 98 BPM | HEIGHT: 62 IN | DIASTOLIC BLOOD PRESSURE: 72 MMHG | WEIGHT: 134 LBS | BODY MASS INDEX: 24.66 KG/M2 | TEMPERATURE: 98.7 F | OXYGEN SATURATION: 100 %

## 2018-06-13 DIAGNOSIS — M54.50 RIGHT-SIDED LOW BACK PAIN WITHOUT SCIATICA, UNSPECIFIED CHRONICITY: Primary | ICD-10-CM

## 2018-06-13 DIAGNOSIS — Z87.828 HISTORY OF MOTOR VEHICLE ACCIDENT: ICD-10-CM

## 2018-06-13 PROCEDURE — 99213 OFFICE O/P EST LOW 20 MIN: CPT | Performed by: FAMILY MEDICINE

## 2018-06-13 ASSESSMENT — PATIENT HEALTH QUESTIONNAIRE - PHQ9: SUM OF ALL RESPONSES TO PHQ QUESTIONS 1-9: 12

## 2018-06-13 ASSESSMENT — ANXIETY QUESTIONNAIRES: GAD7 TOTAL SCORE: 12
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
## 2018-08-02 ENCOUNTER — TELEPHONE (OUTPATIENT)
Dept: FAMILY MEDICINE | Facility: CLINIC | Age: 23
End: 2018-08-02

## 2018-08-02 DIAGNOSIS — Z30.011 OCP (ORAL CONTRACEPTIVE PILLS) INITIATION: ICD-10-CM

## 2018-08-02 NOTE — TELEPHONE ENCOUNTER
Prior Authorization Retail Medication Request    Medication/Dose: contraceptives drospirenone-ethinyl estradiol (GIRISH) 3-0.02 MG per tablet  ICD code (if different than what is on RX):    Previously Tried and Failed:    Rationale:  Called pharmacy to confirm if pt actually needs PA.  They state the error code they have is plan exclusion.  They are not sure if this means she cannot have any contraceptives or if it the formulation we have her on.    Insurance Name:  Not listed  Insurance ID:  [id number]      Pharmacy Information (if different than what is on RX)  Name:  [facility] in  [address]  Phone:  [phone]    Prescriber, do you want to try changing medication or do you want a PA done?  Sabrina Cody

## 2018-08-07 NOTE — TELEPHONE ENCOUNTER
PA Initiation    Medication: contraceptives  Insurance Company: [facility] [employer] [place] - Phone [phone] Fax [phone]  Pharmacy Filling the Rx: [facility] [address] [place], [place] [address] [id number]  Filling Pharmacy Phone: [phone]  Filling Pharmacy Fax:    Start Date: 8/7/2018    THIS HAS BEEN SUBMITTED BY THE PRIOR-AUTHORIZATION TEAM. ANY QUESTIONS PLEASE CALL [phone]. THANK YOU

## 2018-08-08 NOTE — TELEPHONE ENCOUNTER
Prior Authorization Approval    Authorization Effective Date: 8/7/2018  Authorization Expiration Date: 8/7/2019  Medication: contraceptives APPROVED   Approved Dose/Quantity:    Reference #:     Insurance Company: [employer] - Phone [phone] Fax [phone]  Expected CoPay:       CoPay Card Available:      Foundation Assistance Needed:    Which Pharmacy is filling the prescription (Not needed for infusion/clinic administered): [facility] [address] [facility], [place] - [address] [facility] [address] [facility]  Pharmacy Notified: Yes  Patient Notified: Yes

## 2018-11-27 ENCOUNTER — TELEPHONE (OUTPATIENT)
Dept: FAMILY MEDICINE | Facility: CLINIC | Age: 23
End: 2018-11-27

## 2018-11-27 NOTE — TELEPHONE ENCOUNTER
I reached Mica as last Rx was 5 months ago. She was surprised that she hadn't had an Rx since June and thought that my records were wrong. When I pressed in she said that she takes it every other day and she forgets to take it sometimes.    She said she had a refill in September.  I did check  and she fills sporadically. Filled 12/7/17, 4/17/18 and 8/1/18.    Routing to PCP.  OV needed?   Alanna Bill RN- Triage [employer]        amphetamine-dextroamphetamine (ADDERALL XR) 10 MG per 24 hr capsule  Last Written Prescription Date:  6/12/18  Last Fill Quantity: 30,   # refills: 0  Last Office Visit: 6/12/18  Future Office visit:    Next 5 appointments (look out 90 days)     Nov 28, 2018 10:30 AM CST   Nurse Only with SV MA/LPN   [facility] ([facility])    [address] [address] [phone]                   Routing refill request to provider for review/approval because:  Drug not on the [facility], [facility] or [facility] refill protocol or controlled substance        She takes it every other day or less as she forgets to take it.      She states she has been taking for [place] x 4 years.     becky

## 2018-11-27 NOTE — TELEPHONE ENCOUNTER
Pt calling requesting refill of adderall XR. Please call pt at [phone] OK to LM thank you.    Luzmaria Montiel, [facility]  Diabetes and Nutrition Scheduling

## 2018-11-28 ENCOUNTER — ALLIED HEALTH/NURSE VISIT (OUTPATIENT)
Dept: NURSING | Facility: CLINIC | Age: 23
End: 2018-11-28
Payer: COMMERCIAL

## 2018-11-28 DIAGNOSIS — F41.1 GENERALIZED ANXIETY DISORDER: ICD-10-CM

## 2018-11-28 DIAGNOSIS — Z23 NEED FOR PROPHYLACTIC VACCINATION AND INOCULATION AGAINST INFLUENZA: Primary | ICD-10-CM

## 2018-11-28 DIAGNOSIS — F32.1 MODERATE MAJOR DEPRESSION (H): ICD-10-CM

## 2018-11-28 PROCEDURE — 90686 IIV4 VACC NO PRSV 0.5 ML IM: CPT

## 2018-11-28 PROCEDURE — 90471 IMMUNIZATION ADMIN: CPT

## 2018-11-28 NOTE — MR AVS SNAPSHOT
After Visit Summary   11/28/2018    Mica Roy    MRN: [id number]           Patient Information     Date Of Birth          1995        Visit Information        Provider Department      11/28/2018 10:30 AM KARMEN MAGAÑA/LPN [facility] [place]        Today's Diagnoses     Need for prophylactic vaccination and inoculation against influenza    -  1       Follow-ups after your visit        Who to contact     If you have questions or need follow up information about today's clinic visit or your schedule please contact [facility] [place] directly at [phone].  Normal or non-critical lab and imaging results will be communicated to you by Sensus Experiencehart, letter or phone within 4 business days after the clinic has received the results. If you do not hear from us within 7 days, please contact the clinic through United Information Technologyt or phone. If you have a critical or abnormal lab result, we will notify you by phone as soon as possible.  Submit refill requests through PerfectSearch or call your pharmacy and they will forward the refill request to us. Please allow 3 business days for your refill to be completed.          Additional Information About Your Visit        MyChart Information     PerfectSearch gives you secure access to your electronic health record. If you see a primary care provider, you can also send messages to your care team and make appointments. If you have questions, please call your primary care clinic.  If you do not have a primary care provider, please call [phone] and they will assist you.        Care EveryWhere ID     This is your Care EveryWhere ID. This could be used by other organizations to access your [place] medical records  [id number]         Blood Pressure from Last 3 Encounters:   06/13/18 106/72   06/12/18 104/76   04/19/18 112/79    Weight from Last 3 Encounters:   06/13/18 134 lb (60.8 kg)   06/12/18 133 lb (60.3 kg)   04/17/18 126 lb (57.2 kg)              We Performed the Following      FLU VACCINE, SPLIT VIRUS, IM (QUADRIVALENT) [44985]- >3 YRS     Vaccine Administration, Initial [86858]        Primary Care Provider Office Phone # Fax #    [facility] [phone] [address]  [facility] [address]        Equal Access to Services     TAZ SALAS : Hadii aad ku hadasho Soomaali, waaxda luqadaha, qaybta kaalmada adeegyada, waxay jaziel leonan kecia mitzyusama shankar. So [facility] [phone].    ATENCIÓN: Si habla español, tiene a mullen disposición servicios gratuitos de asistencia lingüística. Noadoris al [phone].    We comply with applicable federal civil rights laws and [place] laws. We do not discriminate on the basis of race, color, national origin, age, disability, sex, sexual orientation, or gender identity.            Thank you!     Thank you for choosing [facility]  for your care. Our goal is always to provide you with excellent care. Hearing back from our patients is one way we can continue to improve our services. Please take a few minutes to complete the written survey that you may receive in the mail after your visit with us. Thank you!             Your Updated Medication List - Protect others around you: Learn how to safely use, store and throw away your medicines at www.disposemymeds.org.          This list is accurate as of 11/28/18 11:15 AM.  Always use your most recent med list.                   Brand Name Dispense Instructions for use Diagnosis    amphetamine-dextroamphetamine 10 MG 24 hr capsule    ADDERALL XR    30 capsule    Take 1 capsule (10 mg) by mouth daily    Attention deficit hyperactivity disorder (ADHD), unspecified ADHD type       cetirizine 10 MG tablet    zyrTEC    90 tablet    Take 1 tablet (10 mg) by mouth daily    Seasonal allergic rhinitis, unspecified chronicity, unspecified trigger       drospirenone-ethinyl estradiol 3-0.02 MG per tablet    GIRISH    84 tablet    Take 1 tablet by mouth daily    OCP (oral contraceptive  pills) initiation       escitalopram 10 MG tablet    LEXAPRO    30 tablet    Take 1 tablet (10 mg) by mouth daily    Moderate major depression (H), Generalized anxiety disorder       tolterodine ER 4 MG 24 hr capsule    DETROL LA    30 capsule    Take 1 capsule (4 mg) by mouth daily    OAB (overactive bladder)

## 2018-11-28 NOTE — PROGRESS NOTES

## 2018-11-28 NOTE — TELEPHONE ENCOUNTER
"Requested Prescriptions   Pending Prescriptions Disp Refills     escitalopram (LEXAPRO) 10 MG tablet 30 tablet 1    Last Written Prescription Date:  6/12/2018  Last Fill Quantity: 30,  # refills: 1   Last office visit: 6/13/2018 with prescribing provider:  Olivia   Future Office Visit:     Sig: Take 1 tablet (10 mg) by mouth daily    SSRIs Protocol Failed    11/28/2018  4:27 PM       Failed - PHQ-9 score less than 5 in past 6 months    Please review last PHQ-9 score.          Passed - Patient is age 18 or older       Passed - No active pregnancy on record       Passed - No positive pregnancy test in last 12 months       Passed - Recent (6 mo) or future (30 days) visit within the authorizing provider's specialty    Patient had office visit in the last 6 months or has a visit in the next 30 days with authorizing provider or within the authorizing provider's specialty.  See \"Patient Info\" tab in inbasket, or \"Choose Columns\" in Meds & Orders section of the refill encounter.            Routing refill request to provider for review/approval because:  Patient needs to be seen because:  Patient advised to follow up after being on medication for one month.  Patient has not followed up.    JAMES HamN, RN  [employer] [facility]          "

## 2018-11-29 PROBLEM — Z79.899 CONTROLLED SUBSTANCE AGREEMENT SIGNED: Status: ACTIVE | Noted: 2018-11-29

## 2018-11-30 RX ORDER — ESCITALOPRAM OXALATE 10 MG/1
10 TABLET ORAL DAILY
Qty: 30 TABLET | Refills: 1 | OUTPATIENT
Start: 2018-11-30

## 2018-11-30 NOTE — TELEPHONE ENCOUNTER
Patient is due for follow up in clinic. Please assist her in scheduling appointment.    Raji Baker,   11/30/2018 12:25 AM

## 2018-11-30 NOTE — TELEPHONE ENCOUNTER
Mica is due for follow up. See other refill encounter. Refill request denied.    Raji Baker DO  11/30/2018 12:32 AM

## 2018-11-30 NOTE — TELEPHONE ENCOUNTER
Called pt and let her know she is due for med follow up.  She asked to call back to make the appt.  Sabrina Cody

## 2018-12-05 ENCOUNTER — OFFICE VISIT (OUTPATIENT)
Dept: FAMILY MEDICINE | Facility: CLINIC | Age: 23
End: 2018-12-05
Payer: COMMERCIAL

## 2018-12-05 VITALS
OXYGEN SATURATION: 99 % | TEMPERATURE: 98.9 F | DIASTOLIC BLOOD PRESSURE: 78 MMHG | HEART RATE: 99 BPM | BODY MASS INDEX: 23.78 KG/M2 | SYSTOLIC BLOOD PRESSURE: 118 MMHG | WEIGHT: 130 LBS

## 2018-12-05 DIAGNOSIS — F32.1 MODERATE MAJOR DEPRESSION (H): ICD-10-CM

## 2018-12-05 DIAGNOSIS — F41.1 GENERALIZED ANXIETY DISORDER: ICD-10-CM

## 2018-12-05 DIAGNOSIS — F90.9 ATTENTION DEFICIT HYPERACTIVITY DISORDER (ADHD), UNSPECIFIED ADHD TYPE: ICD-10-CM

## 2018-12-05 PROCEDURE — 99214 OFFICE O/P EST MOD 30 MIN: CPT | Performed by: FAMILY MEDICINE

## 2018-12-05 RX ORDER — ESCITALOPRAM OXALATE 10 MG/1
10 TABLET ORAL DAILY
Qty: 90 TABLET | Refills: 1 | Status: SHIPPED | OUTPATIENT
Start: 2018-12-05 | End: 2019-05-23

## 2018-12-05 RX ORDER — DEXTROAMPHETAMINE SACCHARATE, AMPHETAMINE ASPARTATE MONOHYDRATE, DEXTROAMPHETAMINE SULFATE AND AMPHETAMINE SULFATE 2.5; 2.5; 2.5; 2.5 MG/1; MG/1; MG/1; MG/1
10 CAPSULE, EXTENDED RELEASE ORAL DAILY
Qty: 30 CAPSULE | Refills: 0 | Status: SHIPPED | OUTPATIENT
Start: 2018-12-05 | End: 2020-10-15

## 2018-12-05 ASSESSMENT — ANXIETY QUESTIONNAIRES
1. FEELING NERVOUS, ANXIOUS, OR ON EDGE: MORE THAN HALF THE DAYS
3. WORRYING TOO MUCH ABOUT DIFFERENT THINGS: MORE THAN HALF THE DAYS
GAD7 TOTAL SCORE: 13
5. BEING SO RESTLESS THAT IT IS HARD TO SIT STILL: NEARLY EVERY DAY
2. NOT BEING ABLE TO STOP OR CONTROL WORRYING: MORE THAN HALF THE DAYS
7. FEELING AFRAID AS IF SOMETHING AWFUL MIGHT HAPPEN: NOT AT ALL
IF YOU CHECKED OFF ANY PROBLEMS ON THIS QUESTIONNAIRE, HOW DIFFICULT HAVE THESE PROBLEMS MADE IT FOR YOU TO DO YOUR WORK, TAKE CARE OF THINGS AT HOME, OR GET ALONG WITH OTHER PEOPLE: VERY DIFFICULT
6. BECOMING EASILY ANNOYED OR IRRITABLE: MORE THAN HALF THE DAYS

## 2018-12-05 ASSESSMENT — PATIENT HEALTH QUESTIONNAIRE - PHQ9
SUM OF ALL RESPONSES TO PHQ QUESTIONS 1-9: 11
5. POOR APPETITE OR OVEREATING: MORE THAN HALF THE DAYS

## 2018-12-05 NOTE — MR AVS SNAPSHOT
After Visit Summary   12/5/2018    Mica Roy    MRN: [id number]           Patient Information     Date Of Birth          1995        Visit Information        Provider Department      12/5/2018 9:00 AM Raji Baker,  [facility]        Today's Diagnoses     Attention deficit hyperactivity disorder (ADHD), unspecified ADHD type        Moderate major depression (H)        Generalized anxiety disorder           Follow-ups after your visit        Follow-up notes from your care team     Return in about 6 months (around 6/5/2019) for ADD follow up.      Who to contact     If you have questions or need follow up information about today's clinic visit or your schedule please contact [facility] [place] directly at [phone].  Normal or non-critical lab and imaging results will be communicated to you by MyChart, letter or phone within 4 business days after the clinic has received the results. If you do not hear from us within 7 days, please contact the clinic through [employer] or phone. If you have a critical or abnormal lab result, we will notify you by phone as soon as possible.  Submit refill requests through [employer] or call your pharmacy and they will forward the refill request to us. Please allow 3 business days for your refill to be completed.          Additional Information About Your Visit        MyChart Information     [employer] gives you secure access to your electronic health record. If you see a primary care provider, you can also send messages to your care team and make appointments. If you have questions, please call your primary care clinic.  If you do not have a primary care provider, please call [phone] and they will assist you.        Care EveryWhere ID     This is your Care EveryWhere ID. This could be used by other organizations to access your [facility] medical records  [id number]        Your Vitals Were     Pulse Temperature Pulse Oximetry BMI (Body Mass  Index)          99 98.9  F (37.2  C) (Oral) 99% 23.78 kg/m2         Blood Pressure from Last 3 Encounters:   12/05/18 118/78   06/13/18 106/72   06/12/18 104/76    Weight from Last 3 Encounters:   12/05/18 130 lb (59 kg)   06/13/18 134 lb (60.8 kg)   06/12/18 133 lb (60.3 kg)              Today, you had the following     No orders found for display         Where to get your medicines      These medications were sent to [id number] - [place], [place] - [address] [place] [address]  [address], [place] [address]     Phone:  [phone]     escitalopram 10 MG tablet         Some of these will need a paper prescription and others can be bought over the counter.  Ask your nurse if you have questions.     Bring a paper prescription for each of these medications     amphetamine-dextroamphetamine 10 MG 24 hr capsule          Primary Care Provider Office Phone # Fax #    [facility] [phone] [address]  [place] [address]        Equal Access to Services     [facility]: Hadii aad ku hadasho Soomaali, waaxda luqadaha, qaybta kaalmada adeegyada, waxay yrnin haybhavanan kecia shetty . So [facility] [phone].    ATENCIÓN: Si habla español, tiene a mullen disposición servicios gratuitos de asistencia lingüística. Llame al [phone].    We comply with applicable federal civil rights laws and [place] laws. We do not discriminate on the basis of race, color, national origin, age, disability, sex, sexual orientation, or gender identity.            Thank you!     Thank you for choosing [facility]  for your care. Our goal is always to provide you with excellent care. Hearing back from our patients is one way we can continue to improve our services. Please take a few minutes to complete the written survey that you may receive in the mail after your visit with us. Thank you!             Your Updated Medication List - Protect others around you: Learn  how to safely use, store and throw away your medicines at www.disposemymeds.org.          This list is accurate as of 12/5/18  9:26 AM.  Always use your most recent med list.                   Brand Name Dispense Instructions for use Diagnosis    amphetamine-dextroamphetamine 10 MG 24 hr capsule    ADDERALL XR    30 capsule    Take 1 capsule (10 mg) by mouth daily    Attention deficit hyperactivity disorder (ADHD), unspecified ADHD type       cetirizine 10 MG tablet    zyrTEC    90 tablet    Take 1 tablet (10 mg) by mouth daily    Seasonal allergic rhinitis, unspecified chronicity, unspecified trigger       drospirenone-ethinyl estradiol 3-0.02 MG tablet    GIRISH    84 tablet    Take 1 tablet by mouth daily    OCP (oral contraceptive pills) initiation       escitalopram 10 MG tablet    LEXAPRO    90 tablet    Take 1 tablet (10 mg) by mouth daily    Moderate major depression (H), Generalized anxiety disorder

## 2018-12-05 NOTE — PROGRESS NOTES
SUBJECTIVE:                                                    Mica Roy is a 23 year old female who presents to clinic today for the following health issues:      Depression and Anxiety Follow-Up    Status since last visit: No change    Other associated symptoms:None    Complicating factors:     Significant life event: No     Current substance abuse: None    PHQ 4/4/2018 6/12/2018 12/5/2018   PHQ-9 Total Score 4 12 11   Q9: Suicide Ideation Not at all More than half the days Several days   F/U: Thoughts of suicide or self-harm - - No   F/U: Safety concerns - - No     CAL-7 SCORE 12/7/2017 6/12/2018 12/5/2018   Total Score - - -   Total Score 17 12 13     In the past two weeks have you had thoughts of suicide or self-harm?  No.    Do you have concerns about your personal safety or the safety of others?   No  PHQ-9  English  PHQ-9   Any Language  CAL-7  Suicide Assessment Five-step Evaluation and Treatment (SAFE-T)    Amount of exercise or physical activity: 2-3 days/week for an average of greater than 60 minutes    Problems taking medications regularly: No    Medication side effects: none    Diet: regular (no restrictions)    Not taking Lexapro regularly. She states she feels like she just has a higher level of anxiety. She feels like her mood is overall good and not interested in adjusting medication.    Medication Followup of Adderall    Taking Medication as prescribed: yes    Side Effects:  Keeps her awake - tries to only take it when she needs it; some decrease in appetite but no effect on weight. Denies any chest pain, palpitations, tics    Medication Helping Symptoms:  yes       Problem list and histories reviewed & adjusted, as indicated.  Additional history: as documented    Patient Active Problem List   Diagnosis     Achilles tendonitis     Knee pain     Moderate major depression (H)     Generalized anxiety disorder     Attention deficit hyperactivity disorder (ADHD)     Controlled substance  agreement signed     Past Surgical History:   Procedure Laterality Date     NO HISTORY OF SURGERY         Social History     Tobacco Use     Smoking status: Never Smoker     Smokeless tobacco: Never Used   Substance Use Topics     Alcohol use: No     Family History   Problem Relation Age of Onset     Depression Mother      Hypertension Mother      Osteoporosis Mother      Diabetes No family hx of      Coronary Artery Disease No family hx of      Hyperlipidemia No family hx of      Cerebrovascular Disease No family hx of      Breast Cancer No family hx of      Colon Cancer No family hx of      Other Cancer No family hx of      Thyroid Disease No family hx of            ROS:  Constitutional, HEENT, cardiovascular, pulmonary, gi and gu systems are negative, except as otherwise noted.    OBJECTIVE:     /78   Pulse 99   Temp 98.9  F (37.2  C) (Oral)   Wt 59 kg (130 lb)   SpO2 99%   BMI 23.78 kg/m    Body mass index is 23.78 kg/m .  GENERAL: healthy, alert and no distress  RESP: lungs clear to auscultation - no rales, rhonchi or wheezes  CV: regular rate and rhythm, normal S1 S2, no S3 or S4, no murmur, click or rub, no peripheral edema and peripheral pulses strong  MS: no gross musculoskeletal defects noted, no edema  PSYCH: mentation appears normal, affect normal/bright    Diagnostic Test Results:  none     ASSESSMENT/PLAN:   1. Attention deficit hyperactivity disorder (ADHD), unspecified ADHD type: medication working well. Continue current regimen. Follow up in 6 months  - amphetamine-dextroamphetamine (ADDERALL XR) 10 MG 24 hr capsule; Take 1 capsule (10 mg) by mouth daily  Dispense: 30 capsule; Refill: 0    2. Moderate major depression (H): phq9 and gad7 scores above goal levels, however, she feels that symptoms are adequately controlled. Advised taking medication regularly and she may even feel better.   - escitalopram (LEXAPRO) 10 MG tablet; Take 1 tablet (10 mg) by mouth daily  Dispense: 90 tablet;  Refill: 1    3. Generalized anxiety disorder  - escitalopram (LEXAPRO) 10 MG tablet; Take 1 tablet (10 mg) by mouth daily  Dispense: 90 tablet; Refill: 1      Raji Baker DO  [facility]

## 2018-12-07 ASSESSMENT — ANXIETY QUESTIONNAIRES: GAD7 TOTAL SCORE: 13

## 2019-03-19 ENCOUNTER — TELEPHONE (OUTPATIENT)
Dept: FAMILY MEDICINE | Facility: CLINIC | Age: 24
End: 2019-03-19

## 2019-03-19 NOTE — TELEPHONE ENCOUNTER
Patient needs an order for chest xray for TB testing. Advised to contact PCP for orders. Please call patient when orders are in.  [phone] (home) - ok to leave detailed message: yes  Thank you   An Le

## 2019-03-19 NOTE — TELEPHONE ENCOUNTER
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  Patient had a TB gold test at [facility] in June 2018.  Patient states this is on her \"list to do\" for school.  Patient is unclear if she needs a CXR or a TB gold test.    Patient states she was given a vaccine for TB before coming to the [place] a small child and she always tests positive.      Routing to provider for review and advise as appropriate.    Patient can be reached at: [phone], ok to leave a detailed message.    JAMES HamN, RN  [employer] [facility] [facility]      "

## 2019-05-10 ENCOUNTER — TELEPHONE (OUTPATIENT)
Dept: FAMILY MEDICINE | Facility: CLINIC | Age: 24
End: 2019-05-10

## 2019-05-10 NOTE — LETTER
[facility]  [address]  [place] [address]  [phone]  May 23, 2019    Mica Roy  [address] [address]    Dear Mica,    I care about your health and have reviewed your health plan. I have reviewed your medical conditions, medication list, and lab results and am making recommendations based on this review, to better manage your health.    You are in particular need of attention regarding:  -Depression    I am recommending that you:  -Please complete the attached questionnaire and mail it back in the envelope provided.    Here is a list of Health Maintenance topics that are due now or due soon:  Health Maintenance Due   Topic Date Due     DEPRESSION ACTION PLAN  1995     PAP  1995     HIV SCREENING  06/27/2010     PREVENTIVE CARE VISIT  08/30/2017     CHLAMYDIA SCREENING  04/03/2019     PHQ-9  06/05/2019       Please call us at [phone] (or use [employer]) to address the above recommendations.     Thank you for trusting [facility] and we appreciate the opportunity to serve you.  We look forward to supporting your healthcare needs in the future.    Healthy Regards,    Raji Baker,

## 2019-05-10 NOTE — TELEPHONE ENCOUNTER
Pt is due now to update PHQ9.  [employer] message sent to pt. Follow up end date 8/4/19.   PHQ-9 SCORE 4/4/2018 6/12/2018 12/5/2018   PHQ-9 Total Score - - -   PHQ-9 Total Score 4 12 11     Spencer HOOK, BRITTNY

## 2019-05-23 ENCOUNTER — OFFICE VISIT (OUTPATIENT)
Dept: FAMILY MEDICINE | Facility: CLINIC | Age: 24
End: 2019-05-23
Payer: COMMERCIAL

## 2019-05-23 VITALS
DIASTOLIC BLOOD PRESSURE: 60 MMHG | OXYGEN SATURATION: 98 % | HEIGHT: 62 IN | SYSTOLIC BLOOD PRESSURE: 110 MMHG | HEART RATE: 93 BPM | WEIGHT: 120 LBS | BODY MASS INDEX: 22.08 KG/M2 | TEMPERATURE: 98.8 F

## 2019-05-23 DIAGNOSIS — F41.1 GENERALIZED ANXIETY DISORDER: ICD-10-CM

## 2019-05-23 DIAGNOSIS — Z79.899 CONTROLLED SUBSTANCE AGREEMENT SIGNED: ICD-10-CM

## 2019-05-23 DIAGNOSIS — F32.1 MODERATE MAJOR DEPRESSION (H): Primary | ICD-10-CM

## 2019-05-23 DIAGNOSIS — J30.2 SEASONAL ALLERGIC RHINITIS, UNSPECIFIED TRIGGER: ICD-10-CM

## 2019-05-23 DIAGNOSIS — F90.9 ATTENTION DEFICIT HYPERACTIVITY DISORDER (ADHD), UNSPECIFIED ADHD TYPE: ICD-10-CM

## 2019-05-23 PROCEDURE — 99214 OFFICE O/P EST MOD 30 MIN: CPT | Performed by: FAMILY MEDICINE

## 2019-05-23 RX ORDER — CETIRIZINE HYDROCHLORIDE 10 MG/1
10 TABLET ORAL DAILY
Qty: 90 TABLET | Refills: 1 | Status: SHIPPED | OUTPATIENT
Start: 2019-05-23 | End: 2020-10-15

## 2019-05-23 RX ORDER — DEXTROAMPHETAMINE SACCHARATE, AMPHETAMINE ASPARTATE MONOHYDRATE, DEXTROAMPHETAMINE SULFATE AND AMPHETAMINE SULFATE 2.5; 2.5; 2.5; 2.5 MG/1; MG/1; MG/1; MG/1
10 CAPSULE, EXTENDED RELEASE ORAL DAILY
Qty: 30 CAPSULE | Refills: 0 | Status: SHIPPED | OUTPATIENT
Start: 2019-05-23 | End: 2019-06-22

## 2019-05-23 RX ORDER — ESCITALOPRAM OXALATE 10 MG/1
10 TABLET ORAL DAILY
Qty: 90 TABLET | Refills: 1 | Status: SHIPPED | OUTPATIENT
Start: 2019-05-23 | End: 2020-10-15

## 2019-05-23 RX ORDER — DEXTROAMPHETAMINE SACCHARATE, AMPHETAMINE ASPARTATE MONOHYDRATE, DEXTROAMPHETAMINE SULFATE AND AMPHETAMINE SULFATE 2.5; 2.5; 2.5; 2.5 MG/1; MG/1; MG/1; MG/1
10 CAPSULE, EXTENDED RELEASE ORAL DAILY
Qty: 30 CAPSULE | Refills: 0 | Status: SHIPPED | OUTPATIENT
Start: 2019-06-23 | End: 2019-07-23

## 2019-05-23 RX ORDER — DEXTROAMPHETAMINE SACCHARATE, AMPHETAMINE ASPARTATE MONOHYDRATE, DEXTROAMPHETAMINE SULFATE AND AMPHETAMINE SULFATE 2.5; 2.5; 2.5; 2.5 MG/1; MG/1; MG/1; MG/1
10 CAPSULE, EXTENDED RELEASE ORAL DAILY
Qty: 30 CAPSULE | Refills: 0 | Status: SHIPPED | OUTPATIENT
Start: 2019-07-24 | End: 2019-08-23

## 2019-05-23 ASSESSMENT — PATIENT HEALTH QUESTIONNAIRE - PHQ9
5. POOR APPETITE OR OVEREATING: NOT AT ALL
SUM OF ALL RESPONSES TO PHQ QUESTIONS 1-9: 6

## 2019-05-23 ASSESSMENT — ANXIETY QUESTIONNAIRES
GAD7 TOTAL SCORE: 1
7. FEELING AFRAID AS IF SOMETHING AWFUL MIGHT HAPPEN: NOT AT ALL
IF YOU CHECKED OFF ANY PROBLEMS ON THIS QUESTIONNAIRE, HOW DIFFICULT HAVE THESE PROBLEMS MADE IT FOR YOU TO DO YOUR WORK, TAKE CARE OF THINGS AT HOME, OR GET ALONG WITH OTHER PEOPLE: NOT DIFFICULT AT ALL
5. BEING SO RESTLESS THAT IT IS HARD TO SIT STILL: NOT AT ALL
2. NOT BEING ABLE TO STOP OR CONTROL WORRYING: NOT AT ALL
1. FEELING NERVOUS, ANXIOUS, OR ON EDGE: NOT AT ALL
3. WORRYING TOO MUCH ABOUT DIFFERENT THINGS: NOT AT ALL
6. BECOMING EASILY ANNOYED OR IRRITABLE: SEVERAL DAYS

## 2019-05-23 ASSESSMENT — MIFFLIN-ST. JEOR: SCORE: 1252.57

## 2019-05-23 NOTE — TELEPHONE ENCOUNTER
Left a non-detailed voicemail for patient to call clinic back. Routing to /MA pool to mail PHQ-9 to patient. Thank you.  JAMES SnowN, RN  [facility]

## 2019-05-23 NOTE — LETTER
[facility]  05/23/19    Patient: Mica Roy  YOB: 1995  Medical Record Number: [id number]  CSN: [id number]                                                                              Non-opioid Controlled Substance Agreement    I understand that my care provider has prescribed a controlled substance to help manage my condition(s). I am taking this medicine to help me function or work. I know this is strong medicine, and that it can cause serious side effects. Controlled substances can be sedating, addicting and may cause a dependency on the drug. They can affect my ability to drive or think, and cause depression. They need to be taken exactly as prescribed. Combining controlled substances with certain medicines or chemicals (such as cocaine, sedatives and tranquilizers, sleeping pills, meth) can be dangerous or even fatal. Also, if I stop controlled substances suddenly, I may have severe withdrawal symptoms.  If not helpful, I may be asked to stop them.    The risks, benefits, and side effects of these medicine(s) were explained to me. I agree that:    1. I will take part in other treatments as advised by my care team. This may be psychiatry or counseling, physical therapy, behavioral therapy, group treatment or a referral to a pain clinic. I will reduce or stop my medicine when my care team tells me to do so.  2. I will take my medicines as prescribed. I will not change the dose or schedule unless my care team tells me to. There will be no refills if I  run out early.   I may be contactedwithout warning and asked to complete a urine drug test or pill count at any time.   3. I will keep all my appointments, and understand this is part of the monitoring of controlled substances. My care team may require an office visit for EVERY controlled substance refill. If I miss appointments or don t follow instructions, my care team may stop my medicine.  4. I will not ask other providers to  prescribe controlled substances, and I will not accept controlled substances from other people. If I need another prescribed controlled substance for a new reason, I will tell my care team within 1 business day.  5. I will use one pharmacy to fill all of my controlled substance prescriptions, and it is up to me to make sure that I do not run out of my medicines on weekends or holidays. If my care team is willing to refill my controlled substance prescription without a visit, I must request refills only during office hours, refills may take up to 3 days to process, and it may take up to 5 to 7 days for my medicine to be mailed and ready at my pharmacy. Prescriptions will not be mailed anywhere except my pharmacy.    6. I am responsible for my prescriptions. If the medicine/prescription is lost or stolen, it will not be replaced. I also agree not to share controlled substance medicines with anyone.              [facility]  05/23/19  Patient:  Mica Roy  YOB: 1995  Medical Record Number: [id number]  CSN: [id number]    7. I agree to not use ANY illegal or recreational drugs. This includes marijuana, cocaine, bath salts or other drugs. I agree not to use alcohol unless my care team says I may. I agree to give urine samples whenever asked. If I don t give a urine sample, the care team may stop my medicine.    8. If I enroll in the [place] Medical Marijuana program, I will tell my care team. I will also sign an agreement to share my medical records with my care team.    9. I will bring in my list of medicines (or my medicine bottles) each time I come to the clinic.   10. I will tell my care team right away if I become pregnant or have a new medical problem treated outside of my regular clinic.  11. I understand that this medicine can affect my thinking and judgment. It may be unsafe for me to drive, use machinery and do dangerous tasks. I will not do any of these things until I know how the  medicine affects me. If my dose changes, I will wait to see how it affects me. I will contact my care team if I have concerns about medicine side effects.    I understand that if I do not follow any of the conditions above, my prescriptions or treatment may be stopped.      I agree that my provider, clinic care team, and pharmacy may work with any city, state or federal law enforcement agency that investigates the misuse, sale, or other diversion of my controlled medicine. I will allow my provider to discuss my care with or share a copy of this agreement with any other treating provider, pharmacy or emergency room where I receive care. I agree to give up (waive) any right of privacy or confidentiality with respect to these consents.   I have read this agreement and have asked questions about anything I did not understand.    ____________________________________________________    ____________  ________  Patient signature                                                         Date      Time    ____________________________________________________     ____________  ________  Witness                                                          Date      Time    ____________________________________________________  Provider signature

## 2019-05-23 NOTE — PROGRESS NOTES
Subjective     Mica Roy is a 23 year old female who presents to clinic today for the following health issues:    HPI   Depression and Anxiety Follow-Up    How are you doing with your depression since your last visit? No change - stable    How are you doing with your anxiety since your last visit?  No change - stable    Are you having other symptoms that might be associated with depression or anxiety? No    Have you had a significant life event? No     Do you have any concerns with your use of alcohol or other drugs? No    Social History     Tobacco Use     Smoking status: Never Smoker     Smokeless tobacco: Never Used   Substance Use Topics     Alcohol use: No     Drug use: No     PHQ 6/12/2018 12/5/2018 5/23/2019   PHQ-9 Total Score 12 11 6   Q9: Thoughts of better off dead/self-harm past 2 weeks More than half the days Several days Not at all   F/U: Thoughts of suicide or self-harm - No -   F/U: Safety concerns - No -     CAL-7 SCORE 6/12/2018 12/5/2018 5/23/2019   Total Score - - -   Total Score 12 13 1     In the past two weeks have you had thoughts of suicide or self-harm?  No.    Do you have concerns about your personal safety or the safety of others?   No    She states she stopped Lexapro about 3 months ago and hasn't noticed a huge difference in her mood. Feels like her mood has been really good. Recently graduated from the [employer]. 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    Suicide Assessment Five-step Evaluation and Treatment (SAFE-T)    Amount of exercise or physical activity: 2-3 days/week for an average of 45-60 minutes    Problems taking medications regularly: No    Medication side effects: none    Diet: regular (no restrictions)      ADHD Follow Up: hasn't been taking medication since recent graduation from school but would like to restart medication as she will be studying for the MCAT. She states that medication does cause some appetite suppression. Denies  "any headaches, chest pain, palpitations, tics, insomnia.      Patient Active Problem List   Diagnosis     Achilles tendonitis     Knee pain     Moderate major depression (H)     Generalized anxiety disorder     Attention deficit hyperactivity disorder (ADHD)     Controlled substance agreement signed     Past Surgical History:   Procedure Laterality Date     NO HISTORY OF SURGERY         Social History     Tobacco Use     Smoking status: Never Smoker     Smokeless tobacco: Never Used   Substance Use Topics     Alcohol use: No     Family History   Problem Relation Age of Onset     Depression Mother      Hypertension Mother      Osteoporosis Mother      Diabetes No family hx of      Coronary Artery Disease No family hx of      Hyperlipidemia No family hx of      Cerebrovascular Disease No family hx of      Breast Cancer No family hx of      Colon Cancer No family hx of      Other Cancer No family hx of      Thyroid Disease No family hx of            Reviewed and updated as needed this visit by Provider         Review of Systems   ROS COMP: Constitutional, HEENT, cardiovascular, pulmonary, gi and gu systems are negative, except as otherwise noted.      Objective    /60   Pulse 93   Temp 98.8  F (37.1  C) (Oral)   Ht 1.575 m (5' 2\")   Wt 54.4 kg (120 lb)   SpO2 98%   BMI 21.95 kg/m    Body mass index is 21.95 kg/m .  Physical Exam   GENERAL: healthy, alert and no distress  RESP: lungs clear to auscultation - no rales, rhonchi or wheezes  CV: regular rate and rhythm, normal S1 S2, no S3 or S4, no murmur, click or rub, no peripheral edema and peripheral pulses strong  MS: no gross musculoskeletal defects noted, no edema  PSYCH: mentation appears normal, affect normal/bright    Diagnostic Test Results:  Labs reviewed in Epic        Assessment & Plan     1. Moderate major depression (H): okay to continue monitoring as she feels like symptoms have been well-controlled without Lexapro. Will refill. If restarting, " take medication consistently and follow up in 3 months.  - escitalopram (LEXAPRO) 10 MG tablet; Take 1 tablet (10 mg) by mouth daily  Dispense: 90 tablet; Refill: 1    2. Generalized anxiety disorder: well-controlled.   - escitalopram (LEXAPRO) 10 MG tablet; Take 1 tablet (10 mg) by mouth daily  Dispense: 90 tablet; Refill: 1    3. Attention deficit hyperactivity disorder (ADHD), unspecified ADHD type: symptoms well-controlled with current regimen. No adverse effects. Continue Adderall XR 10 mg daily.  - amphetamine-dextroamphetamine (ADDERALL XR) 10 MG 24 hr capsule; Take 1 capsule (10 mg) by mouth daily  Dispense: 30 capsule; Refill: 0  - amphetamine-dextroamphetamine (ADDERALL XR) 10 MG 24 hr capsule; Take 1 capsule (10 mg) by mouth daily  Dispense: 30 capsule; Refill: 0  - amphetamine-dextroamphetamine (ADDERALL XR) 10 MG 24 hr capsule; Take 1 capsule (10 mg) by mouth daily  Dispense: 30 capsule; Refill: 0    4. Seasonal allergic rhinitis, unspecified trigger: stable. Refill Zyrtec.  - cetirizine (ZYRTEC) 10 MG tablet; Take 1 tablet (10 mg) by mouth daily  Dispense: 90 tablet; Refill: 1    5. Controlled substance agreement signed        Return in about 3 months (around 8/23/2019) for ADD follow up.    Raji Baker [facility]

## 2019-05-24 ASSESSMENT — ANXIETY QUESTIONNAIRES: GAD7 TOTAL SCORE: 1

## 2019-06-11 ENCOUNTER — TELEPHONE (OUTPATIENT)
Dept: FAMILY MEDICINE | Facility: CLINIC | Age: 24
End: 2019-06-11

## 2019-06-11 NOTE — TELEPHONE ENCOUNTER
Pt is due now to update PHQ9.  [employer] message sent to pt. Follow up end date 8/4/19.   PHQ-9 SCORE 6/12/2018 12/5/2018 5/23/2019   PHQ-9 Total Score - - -   PHQ-9 Total Score 12 11 6     Spencer HOOK, BRITTNY

## 2019-06-18 NOTE — TELEPHONE ENCOUNTER
Please see 5/10/19 encounter. Patient submitted updated PHQ-9 on 5/23/19.  JAMES SnowN, RN  [facility]

## 2019-11-18 ASSESSMENT — ANXIETY QUESTIONNAIRES
7. FEELING AFRAID AS IF SOMETHING AWFUL MIGHT HAPPEN: SEVERAL DAYS
2. NOT BEING ABLE TO STOP OR CONTROL WORRYING: SEVERAL DAYS
5. BEING SO RESTLESS THAT IT IS HARD TO SIT STILL: NEARLY EVERY DAY
IF YOU CHECKED OFF ANY PROBLEMS ON THIS QUESTIONNAIRE, HOW DIFFICULT HAVE THESE PROBLEMS MADE IT FOR YOU TO DO YOUR WORK, TAKE CARE OF THINGS AT HOME, OR GET ALONG WITH OTHER PEOPLE: SOMEWHAT DIFFICULT
GAD7 TOTAL SCORE: 11
3. WORRYING TOO MUCH ABOUT DIFFERENT THINGS: MORE THAN HALF THE DAYS
6. BECOMING EASILY ANNOYED OR IRRITABLE: MORE THAN HALF THE DAYS
1. FEELING NERVOUS, ANXIOUS, OR ON EDGE: MORE THAN HALF THE DAYS

## 2019-11-18 ASSESSMENT — PATIENT HEALTH QUESTIONNAIRE - PHQ9
SUM OF ALL RESPONSES TO PHQ QUESTIONS 1-9: 7
5. POOR APPETITE OR OVEREATING: NOT AT ALL

## 2019-11-19 ASSESSMENT — ANXIETY QUESTIONNAIRES: GAD7 TOTAL SCORE: 11

## 2020-02-17 ENCOUNTER — HEALTH MAINTENANCE LETTER (OUTPATIENT)
Age: 25
End: 2020-02-17

## 2020-10-14 NOTE — PROGRESS NOTES
SUBJECTIVE:   CC: Mica Roy is an 25 year old woman who presents for preventive health visit.       Patient has been advised of split billing requirements and indicates understanding: Yes  Healthy Habits:     Getting at least 3 servings of Calcium per day:  Yes    Bi-annual eye exam:  NO    Dental care twice a year:  Yes    Sleep apnea or symptoms of sleep apnea:  None    Diet:  Vegetarian/vegan    Frequency of exercise:  2-3 days/week    Duration of exercise:  45-60 minutes    Taking medications regularly:  No    Barriers to taking medications:  Cost of medication, Side effects and Other    Medication side effects:  None    PHQ-2 Total Score: 0    Additional concerns today:  No    She has been without Adderall for several months but planning to go back to school for phlebotomy training. Currently working as a CNA. Medication had been working well. Typically would take it every other day because it would cause insomnia. Otherwise, Denies any other side effects -- no headaches, tachycardia, palpitations, numbness/tingling, irritability, mood changes, tics, sweating, weight loss, dry mouth      Today's PHQ-2 Score:   PHQ-2 ( 1999 Pf[employer]) 10/15/2020   Q1: Little interest or pleasure in doing things 0   Q2: Feeling down, depressed or hopeless 0   PHQ-2 Score 0   Q1: Little interest or pleasure in doing things Not at all   Q2: Feeling down, depressed or hopeless Not at all   PHQ-2 Score 0       PHQ 5/23/2019 11/18/2019 10/15/2020   PHQ-9 Total Score 6 7 10   Q9: Thoughts of better off dead/self-harm past 2 weeks Not at all Not at all Several days   F/U: Thoughts of suicide or self-harm - - No   F/U: Safety concerns - - No       CAL-7 SCORE 5/23/2019 11/18/2019 10/15/2020   Total Score - - -   Total Score - - 10 (moderate anxiety)   Total Score 1 11 10       Abuse: Current or Past (Physical, Sexual or Emotional) - No  Do you feel safe in your environment? Yes        Social History     Tobacco Use     Smoking  "status: Never Smoker     Smokeless tobacco: Never Used   Substance Use Topics     Alcohol use: No         Alcohol Use 10/15/2020   Prescreen: >3 drinks/day or >7 drinks/week? Not Applicable   Prescreen: >3 drinks/day or >7 drinks/week? -     Reviewed orders with patient.  Reviewed health maintenance and updated orders accordingly - Yes    Mammogram not appropriate for this patient based on age.    Pertinent mammograms are reviewed under the imaging tab.  History of abnormal Pap smear: Patient declined pap smear today.  PAP / HPV 8/30/2016   PAP NIL     Reviewed and updated as needed this visit by clinical staff   Allergies               Reviewed and updated as needed this visit by Provider                  Review of Systems  CONSTITUTIONAL: NEGATIVE for fever, chills, change in weight  INTEGUMENTARU/SKIN: NEGATIVE for worrisome rashes, moles or lesions  EYES: NEGATIVE for vision changes or irritation  ENT: NEGATIVE for ear, mouth and throat problems  RESP: NEGATIVE for significant cough or SOB  BREAST: NEGATIVE for masses, tenderness or discharge  CV: NEGATIVE for chest pain, palpitations or peripheral edema  GI: NEGATIVE for nausea, abdominal pain, heartburn, or change in bowel habits  : NEGATIVE for unusual urinary or vaginal symptoms. Periods are regular.  MUSCULOSKELETAL: NEGATIVE for significant arthralgias or myalgia  NEURO: NEGATIVE for weakness, dizziness or paresthesias  PSYCHIATRIC: NEGATIVE for changes in mood or affect     OBJECTIVE:   /75 (BP Location: Right arm, Patient Position: Sitting, Cuff Size: Adult Regular)   Pulse 98   Temp 99.2  F (37.3  C) (Tympanic)   Resp 18   Ht 1.575 m (5' 2\")   Wt 61.1 kg (134 lb 11.2 oz)   SpO2 100%   BMI 24.64 kg/m    Physical Exam  GENERAL: healthy, alert and no distress  EYES: Eyes grossly normal to inspection, PERRL and conjunctivae and sclerae normal  HENT: ear canals and TM's normal, nose and mouth without ulcers or lesions  NECK: no adenopathy and " no asymmetry, masses, or scars  RESP: lungs clear to auscultation - no rales, rhonchi or wheezes  CV: regular rate and rhythm, normal S1 S2, no S3 or S4, no murmur, click or rub, no peripheral edema and peripheral pulses strong  MS: no gross musculoskeletal defects noted, no edema  PSYCH: mentation appears normal, affect normal/bright    Diagnostic Test Results:  none     ASSESSMENT/PLAN:   1. Routine general medical examination at a health care facility: health maintenance reviewed and updated. Declined pap smear today. Emphasized the importance of getting this exam done in the near future.    2. Seasonal allergic rhinitis, unspecified trigger  - cetirizine (ZYRTEC) 10 MG tablet; Take 1 tablet (10 mg) by mouth daily  Dispense: 90 tablet; Refill: 1    3. Attention deficit hyperactivity disorder (ADHD), unspecified ADHD type: restart Adderall XR. Follow up in 3 months,.  - amphetamine-dextroamphetamine (ADDERALL XR) 10 MG 24 hr capsule; Take 1 capsule (10 mg) by mouth daily  Dispense: 30 capsule; Refill: 0  - amphetamine-dextroamphetamine (ADDERALL XR) 10 MG 24 hr capsule; Take 1 capsule (10 mg) by mouth daily  Dispense: 30 capsule; Refill: 0  - amphetamine-dextroamphetamine (ADDERALL XR) 10 MG 24 hr capsule; Take 1 capsule (10 mg) by mouth daily  Dispense: 30 capsule; Refill: 0    4. Controlled substance agreement signed  - amphetamine-dextroamphetamine (ADDERALL XR) 10 MG 24 hr capsule; Take 1 capsule (10 mg) by mouth daily  Dispense: 30 capsule; Refill: 0  - amphetamine-dextroamphetamine (ADDERALL XR) 10 MG 24 hr capsule; Take 1 capsule (10 mg) by mouth daily  Dispense: 30 capsule; Refill: 0  - amphetamine-dextroamphetamine (ADDERALL XR) 10 MG 24 hr capsule; Take 1 capsule (10 mg) by mouth daily  Dispense: 30 capsule; Refill: 0    Patient has been advised of split billing requirements and indicates understanding: Yes  COUNSELING:  Reviewed preventive health counseling, as reflected in patient instructions        "Regular exercise       Healthy diet/nutrition    Estimated body mass index is 24.64 kg/m  as calculated from the following:    Height as of this encounter: 1.575 m (5' 2\").    Weight as of this encounter: 61.1 kg (134 lb 11.2 oz).        She reports that she has never smoked. She has never used smokeless tobacco.      Counseling Resources:  ATP IV Guidelines  Pooled Cohorts Equation Calculator  Breast Cancer Risk Calculator  BRCA-Related Cancer Risk Assessment: FHS-7 Tool  FRAX Risk Assessment  ICSI Preventive Guidelines  Dietary Guidelines for Americans, 2010  USDA's MyPlate  ASA Prophylaxis  Lung CA Screening    Raji Baker DO  [facility]  "

## 2020-10-15 ENCOUNTER — OFFICE VISIT (OUTPATIENT)
Dept: FAMILY MEDICINE | Facility: CLINIC | Age: 25
End: 2020-10-15
Payer: COMMERCIAL

## 2020-10-15 VITALS
OXYGEN SATURATION: 100 % | RESPIRATION RATE: 18 BRPM | WEIGHT: 134.7 LBS | HEART RATE: 98 BPM | BODY MASS INDEX: 24.79 KG/M2 | SYSTOLIC BLOOD PRESSURE: 130 MMHG | DIASTOLIC BLOOD PRESSURE: 75 MMHG | TEMPERATURE: 99.2 F | HEIGHT: 62 IN

## 2020-10-15 DIAGNOSIS — F90.9 ATTENTION DEFICIT HYPERACTIVITY DISORDER (ADHD), UNSPECIFIED ADHD TYPE: ICD-10-CM

## 2020-10-15 DIAGNOSIS — Z79.899 CONTROLLED SUBSTANCE AGREEMENT SIGNED: ICD-10-CM

## 2020-10-15 DIAGNOSIS — J30.2 SEASONAL ALLERGIC RHINITIS, UNSPECIFIED TRIGGER: ICD-10-CM

## 2020-10-15 DIAGNOSIS — Z00.00 ROUTINE GENERAL MEDICAL EXAMINATION AT A HEALTH CARE FACILITY: Primary | ICD-10-CM

## 2020-10-15 PROCEDURE — 99213 OFFICE O/P EST LOW 20 MIN: CPT | Mod: 25 | Performed by: FAMILY MEDICINE

## 2020-10-15 PROCEDURE — 99395 PREV VISIT EST AGE 18-39: CPT | Performed by: FAMILY MEDICINE

## 2020-10-15 RX ORDER — CETIRIZINE HYDROCHLORIDE 10 MG/1
10 TABLET ORAL DAILY
Qty: 90 TABLET | Refills: 1 | Status: SHIPPED | OUTPATIENT
Start: 2020-10-15 | End: 2021-10-11

## 2020-10-15 RX ORDER — DEXTROAMPHETAMINE SACCHARATE, AMPHETAMINE ASPARTATE MONOHYDRATE, DEXTROAMPHETAMINE SULFATE AND AMPHETAMINE SULFATE 2.5; 2.5; 2.5; 2.5 MG/1; MG/1; MG/1; MG/1
10 CAPSULE, EXTENDED RELEASE ORAL DAILY
Qty: 30 CAPSULE | Refills: 0 | Status: SHIPPED | OUTPATIENT
Start: 2020-11-15 | End: 2020-12-15

## 2020-10-15 RX ORDER — DEXTROAMPHETAMINE SACCHARATE, AMPHETAMINE ASPARTATE MONOHYDRATE, DEXTROAMPHETAMINE SULFATE AND AMPHETAMINE SULFATE 2.5; 2.5; 2.5; 2.5 MG/1; MG/1; MG/1; MG/1
10 CAPSULE, EXTENDED RELEASE ORAL DAILY
Qty: 30 CAPSULE | Refills: 0 | Status: SHIPPED | OUTPATIENT
Start: 2020-10-15 | End: 2020-11-14

## 2020-10-15 RX ORDER — DEXTROAMPHETAMINE SACCHARATE, AMPHETAMINE ASPARTATE MONOHYDRATE, DEXTROAMPHETAMINE SULFATE AND AMPHETAMINE SULFATE 2.5; 2.5; 2.5; 2.5 MG/1; MG/1; MG/1; MG/1
10 CAPSULE, EXTENDED RELEASE ORAL DAILY
Qty: 30 CAPSULE | Refills: 0 | Status: SHIPPED | OUTPATIENT
Start: 2020-12-16 | End: 2021-01-15

## 2020-10-15 ASSESSMENT — MIFFLIN-ST. JEOR: SCORE: 1309.25

## 2020-10-15 ASSESSMENT — ANXIETY QUESTIONNAIRES
7. FEELING AFRAID AS IF SOMETHING AWFUL MIGHT HAPPEN: NOT AT ALL
5. BEING SO RESTLESS THAT IT IS HARD TO SIT STILL: NEARLY EVERY DAY
GAD7 TOTAL SCORE: 10
7. FEELING AFRAID AS IF SOMETHING AWFUL MIGHT HAPPEN: NOT AT ALL
2. NOT BEING ABLE TO STOP OR CONTROL WORRYING: SEVERAL DAYS
4. TROUBLE RELAXING: MORE THAN HALF THE DAYS
3. WORRYING TOO MUCH ABOUT DIFFERENT THINGS: SEVERAL DAYS
6. BECOMING EASILY ANNOYED OR IRRITABLE: MORE THAN HALF THE DAYS
GAD7 TOTAL SCORE: 10
GAD7 TOTAL SCORE: 10
1. FEELING NERVOUS, ANXIOUS, OR ON EDGE: SEVERAL DAYS

## 2020-10-15 ASSESSMENT — PATIENT HEALTH QUESTIONNAIRE - PHQ9
SUM OF ALL RESPONSES TO PHQ QUESTIONS 1-9: 10
SUM OF ALL RESPONSES TO PHQ QUESTIONS 1-9: 10
10. IF YOU CHECKED OFF ANY PROBLEMS, HOW DIFFICULT HAVE THESE PROBLEMS MADE IT FOR YOU TO DO YOUR WORK, TAKE CARE OF THINGS AT HOME, OR GET ALONG WITH OTHER PEOPLE: SOMEWHAT DIFFICULT

## 2020-10-15 NOTE — LETTER
[facility]  10/15/20    Patient: Mica Roy  YOB: 1995  Medical Record Number: [id number]  CSN: [id number]                                                                              Non-opioid Controlled Substance Agreement    I understand that my care provider has prescribed a controlled substance to help manage my condition(s). I am taking this medicine to help me function or work. I know this is strong medicine, and that it can cause serious side effects. Controlled substances can be sedating, addicting and may cause a dependency on the drug. They can affect my ability to drive or think, and cause depression. They need to be taken exactly as prescribed. Combining controlled substances with certain medicines or chemicals (such as cocaine, sedatives and tranquilizers, sleeping pills, meth) can be dangerous or even fatal. Also, if I stop controlled substances suddenly, I may have severe withdrawal symptoms.  If not helpful, I may be asked to stop them.    The risks, benefits, and side effects of these medicine(s) were explained to me. I agree that:    1. I will take part in other treatments as advised by my care team. This may be psychiatry or counseling, physical therapy, behavioral therapy, group treatment or a referral to a pain clinic. I will reduce or stop my medicine when my care team tells me to do so.  2. I will take my medicines as prescribed. I will not change the dose or schedule unless my care team tells me to. There will be no refills if I  run out early.   I may be contactedwithout warning and asked to complete a urine drug test or pill count at any time.   3. I will keep all my appointments, and understand this is part of the monitoring of controlled substances. My care team may require an office visit for EVERY controlled substance refill. If I miss appointments or don t follow instructions, my care team may stop my medicine.  4. I will not ask other providers  to prescribe controlled substances, and I will not accept controlled substances from other people. If I need another prescribed controlled substance for a new reason, I will tell my care team within 1 business day.  5. I will use one pharmacy to fill all of my controlled substance prescriptions, and it is up to me to make sure that I do not run out of my medicines on weekends or holidays. If my care team is willing to refill my controlled substance prescription without a visit, I must request refills only during office hours, refills may take up to 3 days to process, and it may take up to 5 to 7 days for my medicine to be mailed and ready at my pharmacy. Prescriptions will not be mailed anywhere except my pharmacy.    6. I am responsible for my prescriptions. If the medicine/prescription is lost or stolen, it will not be replaced. I also agree not to share controlled substance medicines with anyone.              [facility]  10/15/20  Patient:  Mica Roy  YOB: 1995  Medical Record Number: [id number]  CSN: [id number]    7. I agree to not use ANY illegal or recreational drugs. This includes marijuana, cocaine, bath salts or other drugs. I agree not to use alcohol unless my care team says I may. I agree to give urine samples whenever asked. If I don t give a urine sample, the care team may stop my medicine.    8. If I enroll in the [place] Medical Marijuana program, I will tell my care team. I will also sign an agreement to share my medical records with my care team.    9. I will bring in my list of medicines (or my medicine bottles) each time I come to the clinic.   10. I will tell my care team right away if I become pregnant or have a new medical problem treated outside of my regular clinic.  11. I understand that this medicine can affect my thinking and judgment. It may be unsafe for me to drive, use machinery and do dangerous tasks. I will not do any of these things until I  know how the medicine affects me. If my dose changes, I will wait to see how it affects me. I will contact my care team if I have concerns about medicine side effects.    I understand that if I do not follow any of the conditions above, my prescriptions or treatment may be stopped.      I agree that my provider, clinic care team, and pharmacy may work with any city, state or federal law enforcement agency that investigates the misuse, sale, or other diversion of my controlled medicine. I will allow my provider to discuss my care with or share a copy of this agreement with any other treating provider, pharmacy or emergency room where I receive care. I agree to give up (waive) any right of privacy or confidentiality with respect to these consents.   I have read this agreement and have asked questions about anything I did not understand.    ____________________________________________________    ____________  ________  Patient signature                                                         Date      Time    ____________________________________________________     ____________  ________  Witness                                                          Date      Time    ____________________________________________________  Provider signature

## 2020-10-16 ASSESSMENT — ANXIETY QUESTIONNAIRES: GAD7 TOTAL SCORE: 10

## 2021-05-19 ENCOUNTER — OFFICE VISIT (OUTPATIENT)
Dept: FAMILY MEDICINE | Facility: CLINIC | Age: 26
End: 2021-05-19
Payer: COMMERCIAL

## 2021-05-19 VITALS
BODY MASS INDEX: 23.92 KG/M2 | HEIGHT: 62 IN | TEMPERATURE: 97.8 F | SYSTOLIC BLOOD PRESSURE: 118 MMHG | RESPIRATION RATE: 12 BRPM | HEART RATE: 83 BPM | OXYGEN SATURATION: 100 % | WEIGHT: 130 LBS | DIASTOLIC BLOOD PRESSURE: 60 MMHG

## 2021-05-19 DIAGNOSIS — F90.9 ATTENTION DEFICIT HYPERACTIVITY DISORDER (ADHD), UNSPECIFIED ADHD TYPE: Primary | ICD-10-CM

## 2021-05-19 DIAGNOSIS — F32.1 MODERATE MAJOR DEPRESSION (H): ICD-10-CM

## 2021-05-19 DIAGNOSIS — Z79.899 CONTROLLED SUBSTANCE AGREEMENT SIGNED: ICD-10-CM

## 2021-05-19 PROCEDURE — 80306 DRUG TEST PRSMV INSTRMNT: CPT | Performed by: FAMILY MEDICINE

## 2021-05-19 PROCEDURE — 96127 BRIEF EMOTIONAL/BEHAV ASSMT: CPT | Performed by: FAMILY MEDICINE

## 2021-05-19 PROCEDURE — 99214 OFFICE O/P EST MOD 30 MIN: CPT | Performed by: FAMILY MEDICINE

## 2021-05-19 RX ORDER — DEXTROAMPHETAMINE SACCHARATE, AMPHETAMINE ASPARTATE MONOHYDRATE, DEXTROAMPHETAMINE SULFATE AND AMPHETAMINE SULFATE 2.5; 2.5; 2.5; 2.5 MG/1; MG/1; MG/1; MG/1
10 CAPSULE, EXTENDED RELEASE ORAL DAILY
Qty: 30 CAPSULE | Refills: 0 | Status: SHIPPED | OUTPATIENT
Start: 2021-05-19 | End: 2021-06-18

## 2021-05-19 RX ORDER — DEXTROAMPHETAMINE SACCHARATE, AMPHETAMINE ASPARTATE MONOHYDRATE, DEXTROAMPHETAMINE SULFATE AND AMPHETAMINE SULFATE 2.5; 2.5; 2.5; 2.5 MG/1; MG/1; MG/1; MG/1
10 CAPSULE, EXTENDED RELEASE ORAL DAILY
Qty: 30 CAPSULE | Refills: 0 | Status: SHIPPED | OUTPATIENT
Start: 2021-06-19 | End: 2021-07-19

## 2021-05-19 RX ORDER — DEXTROAMPHETAMINE SACCHARATE, AMPHETAMINE ASPARTATE MONOHYDRATE, DEXTROAMPHETAMINE SULFATE AND AMPHETAMINE SULFATE 2.5; 2.5; 2.5; 2.5 MG/1; MG/1; MG/1; MG/1
10 CAPSULE, EXTENDED RELEASE ORAL DAILY
Qty: 30 CAPSULE | Refills: 0 | Status: SHIPPED | OUTPATIENT
Start: 2021-07-20 | End: 2021-08-19

## 2021-05-19 ASSESSMENT — PATIENT HEALTH QUESTIONNAIRE - PHQ9
SUM OF ALL RESPONSES TO PHQ QUESTIONS 1-9: 4
10. IF YOU CHECKED OFF ANY PROBLEMS, HOW DIFFICULT HAVE THESE PROBLEMS MADE IT FOR YOU TO DO YOUR WORK, TAKE CARE OF THINGS AT HOME, OR GET ALONG WITH OTHER PEOPLE: SOMEWHAT DIFFICULT
SUM OF ALL RESPONSES TO PHQ QUESTIONS 1-9: 4

## 2021-05-19 ASSESSMENT — ANXIETY QUESTIONNAIRES
7. FEELING AFRAID AS IF SOMETHING AWFUL MIGHT HAPPEN: NOT AT ALL
GAD7 TOTAL SCORE: 7
2. NOT BEING ABLE TO STOP OR CONTROL WORRYING: SEVERAL DAYS
1. FEELING NERVOUS, ANXIOUS, OR ON EDGE: SEVERAL DAYS
GAD7 TOTAL SCORE: 7
3. WORRYING TOO MUCH ABOUT DIFFERENT THINGS: SEVERAL DAYS
4. TROUBLE RELAXING: SEVERAL DAYS
6. BECOMING EASILY ANNOYED OR IRRITABLE: NOT AT ALL
7. FEELING AFRAID AS IF SOMETHING AWFUL MIGHT HAPPEN: NOT AT ALL
GAD7 TOTAL SCORE: 7
5. BEING SO RESTLESS THAT IT IS HARD TO SIT STILL: NEARLY EVERY DAY

## 2021-05-19 ASSESSMENT — PAIN SCALES - GENERAL: PAINLEVEL: NO PAIN (0)

## 2021-05-19 ASSESSMENT — MIFFLIN-ST. JEOR: SCORE: 1287.93

## 2021-05-19 NOTE — PROGRESS NOTES
SUBJECTIVE:   CC: Mica Roy is an 25 year old woman who presents for follow up on medication.    PHQ 11/18/2019 10/15/2020 5/19/2021   PHQ-9 Total Score 7 10 4   Q9: Thoughts of better off dead/self-harm past 2 weeks Not at all Several days Not at all   F/U: Thoughts of suicide or self-harm - No -   F/U: Safety concerns - No -     CAL-7 SCORE 11/18/2019 10/15/2020 5/19/2021   Total Score - - -   Total Score - 10 (moderate anxiety) 7 (mild anxiety)   Total Score 11 10 7       MEDICATION follow up of Adderall-     Medication changes: no        Taking medication as prescribed: no; Rx picked up on 10/15/2020, 11/30/2020, and 12/29/2020. Has been out of medication for a couple months.          New side effects: no - Denies any headaches, tachycardia, palpitations, numbness/tingling, insomnia, irritability, mood changes, tics, sweating, weight loss, dry mouth      Depression: in the past, has taken Lexapro, Wellbutrin. Feels like her mood has been well controlled.       Got a new job at [employer]. Done with school. Planning to take MCAT and looking at med school.       Reviewed and updated as needed this visit by clinical staff  Tobacco  Allergies  Meds  Problems  Med Hx  Surg Hx  Fam Hx  Soc Hx          Reviewed and updated as needed this visit by Provider  Tobacco   Meds  Problems  Med Hx  Surg Hx              Review of Systems  CONSTITUTIONAL: NEGATIVE for fever, chills, change in weight  INTEGUMENTARU/SKIN: NEGATIVE for worrisome rashes, moles or lesions  EYES: NEGATIVE for vision changes or irritation  ENT: NEGATIVE for ear, mouth and throat problems  RESP: NEGATIVE for significant cough or SOB  BREAST: NEGATIVE for masses, tenderness or discharge  CV: NEGATIVE for chest pain, palpitations or peripheral edema  GI: NEGATIVE for nausea, abdominal pain, heartburn, or change in bowel habits  : NEGATIVE for unusual urinary or vaginal symptoms. Periods are regular.  MUSCULOSKELETAL: NEGATIVE for  "significant arthralgias or myalgia  NEURO: NEGATIVE for weakness, dizziness or paresthesias  PSYCHIATRIC: NEGATIVE for changes in mood or affect     OBJECTIVE:   /60   Pulse 83   Temp 97.8  F (36.6  C) (Tympanic)   Resp 12   Ht 1.575 m (5' 2\")   Wt 59 kg (130 lb)   LMP 05/19/2021   SpO2 100%   BMI 23.78 kg/m    Physical Exam  GENERAL: healthy, alert and no distress  RESP: lungs clear to auscultation - no rales, rhonchi or wheezes  CV: regular rate and rhythm, normal S1 S2, no S3 or S4, no murmur, click or rub, no peripheral edema and peripheral pulses strong  MS: no gross musculoskeletal defects noted, no edema  PSYCH: mentation appears normal, affect normal/bright      ASSESSMENT/PLAN:   1. Attention deficit hyperactivity disorder (ADHD), unspecified ADHD type\" well controlled with current medication. 3 month refill signed.   - amphetamine-dextroamphetamine (ADDERALL XR) 10 MG 24 hr capsule; Take 1 capsule (10 mg) by mouth daily  Dispense: 30 capsule; Refill: 0  - amphetamine-dextroamphetamine (ADDERALL XR) 10 MG 24 hr capsule; Take 1 capsule (10 mg) by mouth daily  Dispense: 30 capsule; Refill: 0  - amphetamine-dextroamphetamine (ADDERALL XR) 10 MG 24 hr capsule; Take 1 capsule (10 mg) by mouth daily  Dispense: 30 capsule; Refill: 0  - Drug Abuse Screen Panel 13, Urine (Care Map)    2. Controlled substance agreement signed  - Drug Abuse Screen Panel 13, Urine (Care Map)    3. Moderate major depression (H): well-controlled.    Raji Baker DO  [facility]  "

## 2021-05-20 LAB
AMPHETAMINES UR QL: NOT DETECTED NG/ML
BARBITURATES UR QL SCN: NOT DETECTED NG/ML
BENZODIAZ UR QL SCN: NOT DETECTED NG/ML
BUPRENORPHINE UR QL: NOT DETECTED NG/ML
CANNABINOIDS UR QL: NOT DETECTED NG/ML
COCAINE UR QL SCN: NOT DETECTED NG/ML
D-METHAMPHET UR QL: NOT DETECTED NG/ML
METHADONE UR QL SCN: NOT DETECTED NG/ML
OPIATES UR QL SCN: NOT DETECTED NG/ML
OXYCODONE UR QL SCN: NOT DETECTED NG/ML
PCP UR QL SCN: NOT DETECTED NG/ML
PROPOXYPH UR QL: NOT DETECTED NG/ML
TRICYCLICS UR QL SCN: NOT DETECTED NG/ML

## 2021-05-20 ASSESSMENT — ANXIETY QUESTIONNAIRES: GAD7 TOTAL SCORE: 7

## 2021-08-14 ENCOUNTER — OFFICE VISIT (OUTPATIENT)
Dept: FAMILY MEDICINE | Facility: CLINIC | Age: 26
End: 2021-08-14
Payer: COMMERCIAL

## 2021-08-14 VITALS
BODY MASS INDEX: 24.56 KG/M2 | TEMPERATURE: 99.4 F | DIASTOLIC BLOOD PRESSURE: 77 MMHG | RESPIRATION RATE: 14 BRPM | HEART RATE: 105 BPM | SYSTOLIC BLOOD PRESSURE: 110 MMHG | WEIGHT: 134.3 LBS | OXYGEN SATURATION: 99 %

## 2021-08-14 DIAGNOSIS — R07.0 THROAT PAIN: Primary | ICD-10-CM

## 2021-08-14 DIAGNOSIS — R05.9 COUGH: ICD-10-CM

## 2021-08-14 LAB
DEPRECATED S PYO AG THROAT QL EIA: NEGATIVE
GROUP A STREP BY PCR: NOT DETECTED

## 2021-08-14 PROCEDURE — 87651 STREP A DNA AMP PROBE: CPT | Performed by: PHYSICIAN ASSISTANT

## 2021-08-14 PROCEDURE — 99213 OFFICE O/P EST LOW 20 MIN: CPT | Performed by: PHYSICIAN ASSISTANT

## 2021-08-14 NOTE — PATIENT INSTRUCTIONS
"Rapid Strep test was negative.     If overnight test returns positive, we will call tomorrow and call in antibiotic prescription.    No notification means that overnight test returned negative.    Symptoms are likely due to viral infection that will resolve on its own in 1-2 weeks.    May continue with symptomatic treatments including:  - Salt water gargles  - Warm beverages such as a non-caffeinated tea with honey  - Throat drops  - Ibuprofen or acetaminophen for pain or fever relief    If fevers not coming down with acetaminophen or ibuprofen, shortness of breath, not tolerating oral liquid intake, drooling, or stiff neck, return for re-evaluation immediately. Otherwise, if no improvement in the next week, follow up with your primary care provider.    Discharge Instructions for COVID-19 Patients  You have--or may have--COVID-19. Please follow the instructions listed below.   If you have a weakened immune system, discuss with your doctor any other actions you need to take.  How can I protect others?  If you have symptoms (fever, cough, body aches or trouble breathing):    Stay home and away from others (self-isolate) until:  ? Your other symptoms have resolved (gotten better). And   ? You've had no fever--and no medicine that reduces fever--for 1 full day (24 hours). And   ? At least 10 days have passed since your symptoms started. (You may need to wait 20 days. Follow the advice of your care team.)  If you don't show symptoms, but testing showed that you have COVID-19:    Stay home and away from others (self-isolate) until at least 10 days have passed since the date of your first positive COVID-19 test.  During this time    Stay in your own room, even for meals. Use your own bathroom if you can.    Stay away from others in your home. No hugging, kissing or shaking hands. No visitors.    Don't go to work, school or anywhere else.    Clean \"high touch\" surfaces often (doorknobs, counters, handles). Use household " cleaning spray or wipes.    You'll find a full list of  on the EPA website: www.epa.gov/pesticide-registration/list-n-disinfectants-use-against-sars-cov-2.    Cover your mouth and nose with a mask or other face covering to avoid spreading germs.    Wash your hands and face often. Use soap and water.    Caregivers in these groups are at risk for severe illness due to COVID-19:  ? People 65 years and older  ? People who live in a nursing home or long-term care facility  ? People with chronic disease (lung, heart, cancer, diabetes, kidney, liver, immunologic)  ? People who have a weakened immune system, including those who:    Are in cancer treatment    Take medicine that weakens the immune system, such as corticosteroids    Had a bone marrow or organ transplant    Have an immune deficiency    Have poorly controlled HIV or AIDS    Are obese (body mass index of 40 or higher)    Smoke regularly    Caregivers should wear gloves while washing dishes, handling laundry and cleaning bedrooms and bathrooms.    Use caution when washing and drying laundry: Don't shake dirty laundry and use the warmest water setting that you can.    For more tips on managing your health at home, go to www.cdc.gov/coronavirus/2019-ncov/downloads/10Things.pdf.  How can I take care of myself at home?  1. Get lots of rest. Drink extra fluids (unless a doctor has told you not to).  2. Take Tylenol (acetaminophen) for fever or pain. If you have liver or kidney problems, ask your family doctor if it's okay to take Tylenol.   Adults can take either:   ? 650 mg (two 325 mg pills) every 4 to 6 hours, or   ? 1,000 mg (two 500 mg pills) every 8 hours as needed.  ? Note: Don't take more than 3,000 mg in one day. Acetaminophen is found in many medicines (both prescribed and over-the-counter medicines). Read all labels to be sure you don't take too much.   For children, check the Tylenol bottle for the right dose. The dose is based on the child's age or  weight.  3. If you have other health problems (like cancer, heart failure, an organ transplant or severe kidney disease): Call your specialty clinic if you don't feel better in the next 2 days.  4. Know when to call 911. Emergency warning signs include:  ? Trouble breathing or shortness of breath  ? Pain or pressure in the chest that doesn't go away  ? Feeling confused like you haven't felt before, or not being able to wake up  ? Bluish-colored lips or face  5. Your doctor may have prescribed a blood thinner medicine. Follow their instructions.  Where can I get more information?    [facility] - About COVID-19:   [email or web address]    CDC - What to Do If You're Sick: www.cdc.gov/coronavirus/2019-ncov/about/steps-when-sick.html    CDC - Ending Home Isolation: www.cdc.gov/coronavirus/2019-ncov/hcp/disposition-in-home-patients.html    CDC - Caring for Someone: www.cdc.gov/coronavirus/2019-ncov/if-you-are-sick/care-for-someone.html    [facility] - Interim Guidance for Hospital Discharge to Home: www.health.[facility].[place]/diseases/coronavirus/hcp/hospdischarge.pdf    Below are the COVID-19 hotlines at the [place] Department of Health ([facility]). Interpreters are available.  ? For health questions: Call 163-024-8641 or 1-118.762.7947 (7 a.m. to 7 p.m.)  ? For questions about schools and childcare: Call 186-214-5472 or 1-969.299.7622 (7 a.m. to 7 p.m.)    For informational purposes only. Not to replace the advice of your health care provider. Clinically reviewed by Dr. Tom Duncan.   Copyright   2020 [employer]. All rights reserved. [employer] [id number] - REV 01/05/21.

## 2021-08-14 NOTE — PROGRESS NOTES
Assessment & Plan:      Problem List Items Addressed This Visit     None      Visit Diagnoses     Throat pain    -  Primary    Relevant Orders    Streptococcus A Rapid Screen w/Reflex to PCR - Clinic Collect (Completed)    Group A Streptococcus PCR Throat Swab    Cough            Medical Decision Making  Patient presents with acute onset sore throat, cough, and rhinorrhea.  Rapid strep is negative for strep pharyngitis.  Patient politely declined COVID-19 swab due to concerns for the nasopharyngeal discomfort.  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  Provided note for patient's work.  Recommended continuing fluids, rest, honey, salt water gargles, and over-the-counter analgesics as needed.  Discussed signs of worsening symptoms and when to follow-up with PCP if no symptom improvement.     Subjective:      Mica Roy is a 26 year old female here for evaluation of sore throat and cough.  Onset of symptoms was 2 to 3 days ago.  Associated symptoms include headache, rhinorrhea, nasal congestion, and subjective fevers.  Patient has been previously vaccinated for COVID-19.  She denies shortness of breath.  Patient has been using salt water gargles and ibuprofen with good relief of symptoms.     The following portions of the patient's history were reviewed and updated as appropriate: allergies, current medications, and problem list.     Review of Systems  Pertinent items are noted in HPI.    Allergies  Allergies   Allergen Reactions     Seasonal Allergies        Family History   Problem Relation Age of Onset     Depression Mother      Hypertension Mother      Osteoporosis Mother      Diabetes No family hx of      Coronary Artery Disease No family hx of      Hyperlipidemia No family hx of      Cerebrovascular Disease No family hx of      Breast Cancer No family hx of      Colon Cancer No family hx of      Other Cancer No family hx of      Thyroid Disease No  family hx of        Social History     Tobacco Use     Smoking status: Never Smoker     Smokeless tobacco: Never Used   Substance Use Topics     Alcohol use: No        Objective:      /77 (BP Location: Left arm, Patient Position: Sitting, Cuff Size: Adult Regular)   Pulse 105   Temp 99.4  F (37.4  C) (Oral)   Resp 14   Wt 60.9 kg (134 lb 4.8 oz)   SpO2 99%   BMI 24.56 kg/m    General appearance - alert, well appearing, and in no distress and non-toxic  Ears - bilateral TM's and external ear canals normal  Nose - normal and patent, no erythema, discharge or polyps  Mouth - Posterior oropharynx has moderate erythema with mild to moderate tonsillar swelling, no exudate, mucous membranes moist, lips and tongue normal  Neck - Moderate bilateral anterior cervical lymphadenopathy  Chest - clear to auscultation, no wheezes, rales or rhonchi, symmetric air entry  Heart - normal rate, regular rhythm, normal S1, S2, no murmurs, rubs, clicks or gallops     Lab & Imaging Results    Results for orders placed or performed in visit on 08/14/21 (from the past 24 hour(s))   Streptococcus A Rapid Screen w/Reflex to PCR - Clinic Collect    Specimen: Throat; Swab   Result Value Ref Range    Group A Strep antigen Negative Negative       I personally reviewed these results and discussed findings with the patient.

## 2021-08-14 NOTE — LETTER
[facility]  [address] [address]  Phone: [phone]  Fax: [phone]    August 14, 2021        Mica Roy  [address]   [facility] [facility] [place] [facility] [place] [address]          To whom it may concern:    RE: Mica Roy    She is excused from work until COVID-19 results return.  If negative, person may return as long as no fevers of 100.4 or higher.  If COVID-19 test is positive, person must wait 10 days from symptom onset (8/13/2021), as well as no fevers and good improvement of symptoms.    Please contact me for questions or concerns.      Sincerely,        Rene Ring PA-C

## 2021-10-11 ENCOUNTER — OFFICE VISIT (OUTPATIENT)
Dept: FAMILY MEDICINE | Facility: CLINIC | Age: 26
End: 2021-10-11
Payer: COMMERCIAL

## 2021-10-11 VITALS
TEMPERATURE: 98.4 F | OXYGEN SATURATION: 100 % | HEIGHT: 62 IN | SYSTOLIC BLOOD PRESSURE: 110 MMHG | DIASTOLIC BLOOD PRESSURE: 64 MMHG | BODY MASS INDEX: 25.95 KG/M2 | WEIGHT: 141 LBS | HEART RATE: 94 BPM

## 2021-10-11 DIAGNOSIS — J30.2 SEASONAL ALLERGIC RHINITIS, UNSPECIFIED TRIGGER: ICD-10-CM

## 2021-10-11 DIAGNOSIS — F90.9 ATTENTION DEFICIT HYPERACTIVITY DISORDER (ADHD), UNSPECIFIED ADHD TYPE: Primary | ICD-10-CM

## 2021-10-11 PROCEDURE — 99213 OFFICE O/P EST LOW 20 MIN: CPT | Performed by: FAMILY MEDICINE

## 2021-10-11 RX ORDER — DEXTROAMPHETAMINE SACCHARATE, AMPHETAMINE ASPARTATE MONOHYDRATE, DEXTROAMPHETAMINE SULFATE AND AMPHETAMINE SULFATE 2.5; 2.5; 2.5; 2.5 MG/1; MG/1; MG/1; MG/1
10 CAPSULE, EXTENDED RELEASE ORAL DAILY
Qty: 30 CAPSULE | Refills: 0 | Status: SHIPPED | OUTPATIENT
Start: 2021-11-11 | End: 2021-12-11

## 2021-10-11 RX ORDER — CETIRIZINE HYDROCHLORIDE 10 MG/1
10 TABLET ORAL DAILY
Qty: 90 TABLET | Refills: 1 | Status: SHIPPED | OUTPATIENT
Start: 2021-10-11 | End: 2021-11-26

## 2021-10-11 RX ORDER — DEXTROAMPHETAMINE SACCHARATE, AMPHETAMINE ASPARTATE MONOHYDRATE, DEXTROAMPHETAMINE SULFATE AND AMPHETAMINE SULFATE 2.5; 2.5; 2.5; 2.5 MG/1; MG/1; MG/1; MG/1
10 CAPSULE, EXTENDED RELEASE ORAL DAILY
Qty: 30 CAPSULE | Refills: 0 | Status: SHIPPED | OUTPATIENT
Start: 2021-12-12 | End: 2022-01-11

## 2021-10-11 RX ORDER — DEXTROAMPHETAMINE SACCHARATE, AMPHETAMINE ASPARTATE MONOHYDRATE, DEXTROAMPHETAMINE SULFATE AND AMPHETAMINE SULFATE 2.5; 2.5; 2.5; 2.5 MG/1; MG/1; MG/1; MG/1
10 CAPSULE, EXTENDED RELEASE ORAL DAILY
Qty: 30 CAPSULE | Refills: 0 | Status: SHIPPED | OUTPATIENT
Start: 2021-10-11 | End: 2021-11-10

## 2021-10-11 ASSESSMENT — ANXIETY QUESTIONNAIRES
3. WORRYING TOO MUCH ABOUT DIFFERENT THINGS: SEVERAL DAYS
GAD7 TOTAL SCORE: 5
7. FEELING AFRAID AS IF SOMETHING AWFUL MIGHT HAPPEN: NOT AT ALL
6. BECOMING EASILY ANNOYED OR IRRITABLE: MORE THAN HALF THE DAYS
2. NOT BEING ABLE TO STOP OR CONTROL WORRYING: SEVERAL DAYS
GAD7 TOTAL SCORE: 5
5. BEING SO RESTLESS THAT IT IS HARD TO SIT STILL: NOT AT ALL
8. IF YOU CHECKED OFF ANY PROBLEMS, HOW DIFFICULT HAVE THESE MADE IT FOR YOU TO DO YOUR WORK, TAKE CARE OF THINGS AT HOME, OR GET ALONG WITH OTHER PEOPLE?: SOMEWHAT DIFFICULT
7. FEELING AFRAID AS IF SOMETHING AWFUL MIGHT HAPPEN: NOT AT ALL
4. TROUBLE RELAXING: NOT AT ALL
1. FEELING NERVOUS, ANXIOUS, OR ON EDGE: SEVERAL DAYS
GAD7 TOTAL SCORE: 5

## 2021-10-11 ASSESSMENT — PATIENT HEALTH QUESTIONNAIRE - PHQ9
SUM OF ALL RESPONSES TO PHQ QUESTIONS 1-9: 6
10. IF YOU CHECKED OFF ANY PROBLEMS, HOW DIFFICULT HAVE THESE PROBLEMS MADE IT FOR YOU TO DO YOUR WORK, TAKE CARE OF THINGS AT HOME, OR GET ALONG WITH OTHER PEOPLE: NOT DIFFICULT AT ALL
SUM OF ALL RESPONSES TO PHQ QUESTIONS 1-9: 6

## 2021-10-11 ASSESSMENT — MIFFLIN-ST. JEOR: SCORE: 1332.82

## 2021-10-11 NOTE — PROGRESS NOTES
"    Assessment & Plan     Attention deficit hyperactivity disorder (ADHD), unspecified ADHD type: medication working well. Doesn't take every day. No adverse effects. Continue current medication  - amphetamine-dextroamphetamine (ADDERALL XR) 10 MG 24 hr capsule; Take 1 capsule (10 mg) by mouth daily  - amphetamine-dextroamphetamine (ADDERALL XR) 10 MG 24 hr capsule; Take 1 capsule (10 mg) by mouth daily  - amphetamine-dextroamphetamine (ADDERALL XR) 10 MG 24 hr capsule; Take 1 capsule (10 mg) by mouth daily    Seasonal allergic rhinitis, unspecified trigger  - cetirizine (ZYRTEC) 10 MG tablet; Take 1 tablet (10 mg) by mouth daily       BMI:   Estimated body mass index is 25.79 kg/m  as calculated from the following:    Height as of this encounter: 1.575 m (5' 2\").    Weight as of this encounter: 64 kg (141 lb).         Return in about 3 months (around 1/11/2022) for follow up adhd.    Raji Baker DO  [facility]   Michelle is a 26 year old who presents for the following health issues     A.D.H.D    History of Present Illness       She eats 4 or more servings of fruits and vegetables daily.She consumes 2 sweetened beverage(s) daily.She exercises with enough effort to increase her heart rate 20 to 29 minutes per day.  She exercises with enough effort to increase her heart rate 3 or less days per week. She is missing 7 dose(s) of medications per week.       Answers for HPI/ROS submitted by the patient on 10/11/2021  If you checked off any problems, how difficult have these problems made it for you to do your work, take care of things at home, or get along with other people?: Not difficult at all  PHQ9 TOTAL SCORE: 6  CAL 7 TOTAL SCORE: 5      Medication Followup of Adderall XR and Zyrtec    Taking Medication as prescribed: yes    Side Effects:  None; Denies any headaches, tachycardia, palpitations, numbness/tingling, irritability, mood changes, tics, sweating, weight loss, dry " "mouth. Sometimes can keep her up at night.    Medication Helping Symptoms:  Yes - really helpful for studying, work.       Review of Systems   Constitutional, HEENT, cardiovascular, pulmonary, gi and gu systems are negative, except as otherwise noted.      Objective    /64 (BP Location: Right arm, Cuff Size: Adult Regular)   Pulse 94   Temp 98.4  F (36.9  C) (Tympanic)   Ht 1.575 m (5' 2\")   Wt 64 kg (141 lb)   SpO2 100%   BMI 25.79 kg/m    Body mass index is 25.79 kg/m .  Physical Exam   GENERAL: healthy, alert and no distress  RESP: lungs clear to auscultation - no rales, rhonchi or wheezes  CV: regular rate and rhythm, normal S1 S2, no S3 or S4, no murmur, click or rub, no peripheral edema and peripheral pulses strong  MS: no gross musculoskeletal defects noted, no edema          "

## 2021-10-11 NOTE — LETTER
[facility] [place]  10/11/21  Patient: Mica Roy  YOB: 1995  Medical Record Number: [id number]                                                                                  Non-Opioid Controlled Substance Agreement    This is an agreement between you and your provider regarding safe and appropriate use of controlled substances prescribed by your care team. Controlled substances are?medicines that can cause physical and mental dependence (abuse).     There are strict laws about having and using these medicines. We here at [facility] are  committed to working with you in your efforts to get better. To support you in this work, we'll help you schedule regular office appointments for medicine refills. If we must cancel or change your appointment for any reason, we'll make sure you have enough medicine to last until your next appointment.     As a Provider, I will:     Listen carefully to your concerns while treating you with respect.     Recommend a treatment plan that I believe is in your best interest and may involve therapies other than medicine.      Talk with you often about the possible benefits and the risk of harm of any medicine that we prescribe for you.    Assess the safety of this medicine and check how well it works.      Provide a plan on how to taper (discontinue or go off) using this medicine if the decision is made to stop its use.      ::  As a Patient, I understand controlled substances:       Are prescribed by my care provider to help me function or work and manage my condition(s).?    Are strong medicines and can cause serious side effects.       Need to be taken exactly as prescribed.?Combining controlled substances with certain medicines or chemicals (such as illegal drugs, alcohol, sedatives, sleeping pills, and benzodiazepines) can be dangerous or even fatal.? If I stop taking my medicines suddenly, I may have severe withdrawal symptoms.     The  risks, benefits, and side effects of these medicine(s) were explained to me. I agree that:    1. I will take part in other treatments as advised by my care team. This may be psychiatry or counseling, physical therapy, behavioral therapy, group treatment or a referral to specialist.    2. I will keep all my appointments and understand this is part of the monitoring of controlled substances.?My care team may require an office visit for EVERY controlled substance refill. If I miss appointments or don t follow instructions, my care team may stop my medicine    3. I will take my medicines as prescribed. I will not change the dose or schedule unless my care team tells me to. There will be no refills if I run out early.      4. I may be asked to come to the clinic and complete a urine drug test or complete a pill count. If I don t give a urine sample or participate in a pill count, the care team may stop my medicine.    5. I will only receive controlled substance prescriptions from this clinic. If I am treated by another provider, I will tell them that I am taking controlled substances and that I have a treatment agreement with this provider. I will inform my [facility] care team within one business day if I am given a prescription for any controlled substance by another healthcare provider. My [facility] care team can contact other providers and pharmacists about my use of any medicines.    6. It is up to me to make sure that I don't run out of my medicines on weekends or holidays.?If my care team is willing to refill my prescription without a visit, I must request refills only during office hours. Refills may take up to 3 business days to process. I will use one pharmacy to fill all my controlled substance prescriptions. I will notify the clinic about any changes to my insurance or medicine availability.    7. I am responsible for my prescriptions. If the medicine/prescription is lost, stolen or destroyed,  it will not be replaced.?I also agree not to share controlled substance medicines with anyone.     8. I am aware I should not use any illegal or recreational drugs. I agree not to drink alcohol unless my care team says I can.     9. If I enroll in the [place] Medical Cannabis program, I will tell my care team before my next refill.    10. I will tell my care team right away if I become pregnant, have a new medical problem treated outside of my regular clinic, or have a change in my medicines.     11. I understand that this medicine can affect my thinking, judgment and reaction time.? Alcohol and drugs affect the brain and body, which can affect the safety of my driving. Being under the influence of alcohol or drugs can affect my decision-making, behaviors, personal safety and the safety of others. Driving while impaired (DWI) can occur if a person is driving, operating or in physical control of a car, motorcycle, boat, snowmobile, ATV, motorbike, off-road vehicle or any other motor vehicle ([place] Statute 169A.20). I understand the risk if I choose to drive or operate any vehicle or machinery.    I understand that if I do not follow any of the conditions above, my prescriptions or treatment may be stopped or changed.   I agree that my provider, clinic care team and pharmacy may work with any city, state or federal law enforcement agency that investigates the misuse, sale or other diversion of my controlled medicine. I will allow my provider to discuss my care with, or share a copy of, this agreement with any other treating provider, pharmacy or emergency room where I receive care.     I have read this agreement and have asked questions about anything I did not understand.    ________________________________________________________  Patient Signature - Kishanastonsonia Roy     ___________________                   Date     ________________________________________________________  Provider Signature - Raji Baker DO        ___________________                   Date     ________________________________________________________  Witness Signature (required if provider not present while patient signing)          ___________________                   Date

## 2021-10-12 ASSESSMENT — ANXIETY QUESTIONNAIRES: GAD7 TOTAL SCORE: 5

## 2021-10-12 ASSESSMENT — PATIENT HEALTH QUESTIONNAIRE - PHQ9: SUM OF ALL RESPONSES TO PHQ QUESTIONS 1-9: 6

## 2021-11-22 DIAGNOSIS — J30.2 SEASONAL ALLERGIC RHINITIS, UNSPECIFIED TRIGGER: ICD-10-CM

## 2021-11-22 NOTE — TELEPHONE ENCOUNTER
Patient needs refill of the Zyrtec , please send to the [facility] in [place]       Chanel Smith

## 2021-11-26 RX ORDER — CETIRIZINE HYDROCHLORIDE 10 MG/1
10 TABLET ORAL DAILY
Qty: 90 TABLET | Refills: 1 | Status: SHIPPED | OUTPATIENT
Start: 2021-11-26 | End: 2022-06-21

## 2021-11-26 NOTE — TELEPHONE ENCOUNTER
Prescription approved per [facility].      Jose Luis Young RN   [facility]

## 2021-12-29 ENCOUNTER — TELEPHONE (OUTPATIENT)
Dept: FAMILY MEDICINE | Facility: CLINIC | Age: 26
End: 2021-12-29

## 2021-12-29 ENCOUNTER — VIRTUAL VISIT (OUTPATIENT)
Dept: FAMILY MEDICINE | Facility: CLINIC | Age: 26
End: 2021-12-29
Payer: COMMERCIAL

## 2021-12-29 DIAGNOSIS — J02.9 SORE THROAT: ICD-10-CM

## 2021-12-29 DIAGNOSIS — J34.89 RHINORRHEA: Primary | ICD-10-CM

## 2021-12-29 PROCEDURE — 99213 OFFICE O/P EST LOW 20 MIN: CPT | Mod: 95 | Performed by: FAMILY MEDICINE

## 2021-12-29 NOTE — TELEPHONE ENCOUNTER
Please phone patient.  i'll write letter for work that she can print from her [employer] account.   Thanks,  Tano Carrillo MD

## 2021-12-29 NOTE — TELEPHONE ENCOUNTER
Called and left detailed message for patient. She is on Dr. Carrillo's schedule for 1:30pm today and Dr. Carrillo doesn't think this is an appropriate office visit. It needs to be switched to a virtual appointment and we can order testing if need be once we complete the virtual visit. I did inform patient of this on her voicemail - stating that she should NOT come to the clinic for an in person visit and we would be switching this to a telephone visit.     Nyaa Woods, CMA

## 2021-12-29 NOTE — TELEPHONE ENCOUNTER
Reason for Call:  Other letter for work    Detailed comments: pt needs letter for work today since she did not go in.    Phone Number Patient can be reached at: Home number on file [phone] (home)    Best Time: anytime    Can we leave a detailed message on this number? YES    Call taken on 12/29/2021 at 2:30 PM by Eligio Arora

## 2021-12-29 NOTE — PROGRESS NOTES
Michelle is a 26 year old who is being evaluated via a billable telephone visit.      What phone number would you like to be contacted at? [phone]  How would you like to obtain your AVS? [employer]    Assessment & Plan     Mica was seen today for pharyngitis, ear problem and nasal congestion.    Diagnoses and all orders for this visit:    Rhinorrhea  -     Streptococcus A Rapid Screen w/Reflex to PCR - Clinic Collect; Future  -     Symptomatic; Yes; 12/22/2021 COVID-19 Virus (Coronavirus) by PCR Nose; Future    Sore throat  -     Streptococcus A Rapid Screen w/Reflex to PCR - Clinic Collect; Future  -     Symptomatic; Yes; 12/22/2021 COVID-19 Virus (Coronavirus) by PCR Nose; Future      Patient Instructions   I would recommend testing for group A Strep and COVID-19, as we had discussed.  You should be receiving a call from  within the next several hours, to help set up a time and location for testing to be performed. Please isolate yourself from the community, as well as from household members, at least until your test results return (typically within 24 hours). If you have access to [employer], your results will be available there and if you do not, a phone call will be made to you (if the test is positive) or a letter will be mailed to your home (if the test is negative).      If a positive test is obtained, we recommend that people isolate until :   1. symptoms are improving  2. they are without fever for at least 24 hours (without use of Ibuprofen or acetaminophen)  3. It has been at least 5 days since their symptoms began.   (we recommend vigilant wearing of facemasks in public for 5 days after ending isolation)    For close contacts of people who test positive for COVID-19, recommendation is for quarantine for 14 days after most recent contact (this could be decreased to 10 days if negative COVID-19 testing is obtained).      Return today (on 12/29/2021) for covid-19/strep testing .    Tano  MD Lorena  [facility]    Prasad Martinez is a 26 year old who presents for phone evaluation for the following health issues    HPI - patient woke up this morning with sore throat and ear pain.  Has been around coworkers who have been recently diagnosed with strep throat.  Temp of 99.1F this morning.  No cough, but has noted a runny nose for the past week or so.  She had taken ibuprofen which has been helpful in reducing temperature and improves throat pain and ability to tolerate p.o. intake.  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  She requests a note for work.  Patient denies any associated GI symptoms, body aches, headaches, changes in sense of taste or smell.  She is interested in obtaining a strep test.     Review of Systems   Constitutional, HEENT, cardiovascular, pulmonary, gi and gu systems are negative, except as otherwise noted.      Objective       Vitals:  No vitals were obtained today due to virtual visit.    Physical Exam    Objective    General: alert/oriented to person/place. Answers questions appropriately.   Affect: pleasant, cooperative.   Respiratory: no labored breathing noted.  No coughing during visit.          Phone call duration: 8 minutes    Tano Carrillo MD   Family medicine physician  [facility]

## 2021-12-29 NOTE — PATIENT INSTRUCTIONS
I would recommend testing for group A Strep and COVID-19, as we had discussed.  You should be receiving a call from  within the next several hours, to help set up a time and location for testing to be performed. Please isolate yourself from the community, as well as from household members, at least until your test results return (typically within 24 hours). If you have access to [employer], your results will be available there and if you do not, a phone call will be made to you (if the test is positive) or a letter will be mailed to your home (if the test is negative).      If a positive test is obtained, we recommend that people isolate until :   1. symptoms are improving  2. they are without fever for at least 24 hours (without use of Ibuprofen or acetaminophen)  3. It has been at least 5 days since their symptoms began.   (we recommend vigilant wearing of facemasks in public for 5 days after ending isolation)    For close contacts of people who test positive for COVID-19, recommendation is for quarantine for 14 days after most recent contact (this could be decreased to 10 days if negative COVID-19 testing is obtained).

## 2021-12-29 NOTE — LETTER
December 29, 2021      Regarding: Mica HOOK alexulises  [address] [address]              To whom it may concern,    Mica was evaluated today for an acute medical concern. Please excuse her from work duties today.  Additional days away from work may be needed depending on the results of today's testing.          Sincerely,      Tano Carrillo MD

## 2022-06-21 ENCOUNTER — OFFICE VISIT (OUTPATIENT)
Dept: FAMILY MEDICINE | Facility: CLINIC | Age: 27
End: 2022-06-21
Payer: COMMERCIAL

## 2022-06-21 VITALS
HEIGHT: 62 IN | SYSTOLIC BLOOD PRESSURE: 114 MMHG | DIASTOLIC BLOOD PRESSURE: 80 MMHG | TEMPERATURE: 99.1 F | HEART RATE: 84 BPM | OXYGEN SATURATION: 100 % | BODY MASS INDEX: 23.55 KG/M2 | RESPIRATION RATE: 16 BRPM | WEIGHT: 128 LBS

## 2022-06-21 DIAGNOSIS — J30.2 SEASONAL ALLERGIC RHINITIS, UNSPECIFIED TRIGGER: ICD-10-CM

## 2022-06-21 DIAGNOSIS — F90.9 ATTENTION DEFICIT HYPERACTIVITY DISORDER (ADHD), UNSPECIFIED ADHD TYPE: Primary | ICD-10-CM

## 2022-06-21 PROCEDURE — 99213 OFFICE O/P EST LOW 20 MIN: CPT | Performed by: FAMILY MEDICINE

## 2022-06-21 RX ORDER — DEXTROAMPHETAMINE SACCHARATE, AMPHETAMINE ASPARTATE MONOHYDRATE, DEXTROAMPHETAMINE SULFATE AND AMPHETAMINE SULFATE 2.5; 2.5; 2.5; 2.5 MG/1; MG/1; MG/1; MG/1
10 CAPSULE, EXTENDED RELEASE ORAL DAILY
Qty: 30 CAPSULE | Refills: 0 | Status: SHIPPED | OUTPATIENT
Start: 2022-07-22 | End: 2022-08-21

## 2022-06-21 RX ORDER — DEXTROAMPHETAMINE SACCHARATE, AMPHETAMINE ASPARTATE, DEXTROAMPHETAMINE SULFATE AND AMPHETAMINE SULFATE 1.25; 1.25; 1.25; 1.25 MG/1; MG/1; MG/1; MG/1
5 TABLET ORAL DAILY
Qty: 30 TABLET | Refills: 0 | Status: SHIPPED | OUTPATIENT
Start: 2022-07-22 | End: 2022-08-21

## 2022-06-21 RX ORDER — DEXTROAMPHETAMINE SACCHARATE, AMPHETAMINE ASPARTATE, DEXTROAMPHETAMINE SULFATE AND AMPHETAMINE SULFATE 1.25; 1.25; 1.25; 1.25 MG/1; MG/1; MG/1; MG/1
5 TABLET ORAL DAILY
Qty: 30 TABLET | Refills: 0 | Status: SHIPPED | OUTPATIENT
Start: 2022-06-21 | End: 2022-07-21

## 2022-06-21 RX ORDER — DEXTROAMPHETAMINE SACCHARATE, AMPHETAMINE ASPARTATE, DEXTROAMPHETAMINE SULFATE AND AMPHETAMINE SULFATE 1.25; 1.25; 1.25; 1.25 MG/1; MG/1; MG/1; MG/1
5 TABLET ORAL DAILY
Qty: 30 TABLET | Refills: 0 | Status: SHIPPED | OUTPATIENT
Start: 2022-08-22 | End: 2022-09-21

## 2022-06-21 RX ORDER — DEXTROAMPHETAMINE SACCHARATE, AMPHETAMINE ASPARTATE MONOHYDRATE, DEXTROAMPHETAMINE SULFATE AND AMPHETAMINE SULFATE 2.5; 2.5; 2.5; 2.5 MG/1; MG/1; MG/1; MG/1
10 CAPSULE, EXTENDED RELEASE ORAL DAILY
Qty: 30 CAPSULE | Refills: 0 | Status: SHIPPED | OUTPATIENT
Start: 2022-06-21 | End: 2022-07-21

## 2022-06-21 RX ORDER — DEXTROAMPHETAMINE SACCHARATE, AMPHETAMINE ASPARTATE MONOHYDRATE, DEXTROAMPHETAMINE SULFATE AND AMPHETAMINE SULFATE 2.5; 2.5; 2.5; 2.5 MG/1; MG/1; MG/1; MG/1
10 CAPSULE, EXTENDED RELEASE ORAL DAILY
Qty: 30 CAPSULE | Refills: 0 | Status: SHIPPED | OUTPATIENT
Start: 2022-08-22 | End: 2022-09-21

## 2022-06-21 RX ORDER — CETIRIZINE HYDROCHLORIDE 10 MG/1
10 TABLET ORAL DAILY
Qty: 90 TABLET | Refills: 1 | Status: SHIPPED | OUTPATIENT
Start: 2022-06-21 | End: 2023-03-03

## 2022-06-21 NOTE — PROGRESS NOTES
Assessment & Plan     Seasonal allergic rhinitis, unspecified trigger: stable, continue cetirizine   - cetirizine (ZYRTEC) 10 MG tablet; Take 1 tablet (10 mg) by mouth daily    Attention deficit hyperactivity disorder (ADHD), unspecified ADHD type: medication working well. Had previously been taking 10 mg XR daily but finding not lasting throughout her work day. Will add 5 mg IR dose. Follow up in 3 months.  - amphetamine-dextroamphetamine (ADDERALL XR) 10 MG 24 hr capsule; Take 1 capsule (10 mg) by mouth daily for 30 days  - amphetamine-dextroamphetamine (ADDERALL XR) 10 MG 24 hr capsule; Take 1 capsule (10 mg) by mouth daily for 30 days  - amphetamine-dextroamphetamine (ADDERALL XR) 10 MG 24 hr capsule; Take 1 capsule (10 mg) by mouth daily for 30 days  - amphetamine-dextroamphetamine (ADDERALL) 5 MG tablet; Take 1 tablet (5 mg) by mouth daily for 30 days  - amphetamine-dextroamphetamine (ADDERALL) 5 MG tablet; Take 1 tablet (5 mg) by mouth daily for 30 days  - amphetamine-dextroamphetamine (ADDERALL) 5 MG tablet; Take 1 tablet (5 mg) by mouth daily for 30 days      Return in about 3 months (around 9/21/2022) for ADHD follow up..    Raji Baker [facility]   Michelle is a 26 year old presenting for the following health issues:    Recheck Medication      History of Present Illness       Reason for visit:  ADHD meds and allergy meds    She eats 4 or more servings of fruits and vegetables daily.She consumes 3 sweetened beverage(s) daily.She exercises with enough effort to increase her heart rate 10 to 19 minutes per day.  She exercises with enough effort to increase her heart rate 3 or less days per week. She is missing 2 dose(s) of medications per week.    Today's PHQ-9         PHQ-9 Total Score: 4    PHQ-9 Q9 Thoughts of better off dead/self-harm past 2 weeks :   Not at all    How difficult have these problems made it for you to do your work, take care of things at  "home, or get along with other people: Somewhat difficult     ADHD: medication working well without side effects. 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 Medication doesn't seem to be effective throughout her whole work day. Was previously taking 5 mg immediate release dose as well as 10 mg XR. Interested in restarting additional dose. Denies any headaches, tachycardia, palpitations, numbness/tingling, insomnia, irritability, mood changes, tics, sweating, weight loss, dry mouth.       Review of Systems   Constitutional, HEENT, cardiovascular, pulmonary, gi and gu systems are negative, except as otherwise noted.      Objective    /80 (BP Location: Left arm, Patient Position: Chair, Cuff Size: Adult Regular)   Pulse 84   Temp 99.1  F (37.3  C) (Tympanic)   Resp 16   Ht 1.575 m (5' 2\")   Wt 58.1 kg (128 lb)   SpO2 100%   BMI 23.41 kg/m    Body mass index is 23.41 kg/m .  Physical Exam   GENERAL: healthy, alert and no distress  RESP: lungs clear to auscultation - no rales, rhonchi or wheezes  CV: regular rates and rhythm, normal S1 S2, no S3 or S4 and no murmur, click or rub  MS: no gross musculoskeletal defects noted, no edema              .  ..  "

## 2022-11-03 ENCOUNTER — VIRTUAL VISIT (OUTPATIENT)
Dept: FAMILY MEDICINE | Facility: CLINIC | Age: 27
End: 2022-11-03
Payer: COMMERCIAL

## 2022-11-03 DIAGNOSIS — F90.9 ATTENTION DEFICIT HYPERACTIVITY DISORDER (ADHD), UNSPECIFIED ADHD TYPE: ICD-10-CM

## 2022-11-03 DIAGNOSIS — F32.1 MODERATE MAJOR DEPRESSION (H): ICD-10-CM

## 2022-11-03 DIAGNOSIS — R09.89 TONSIL PAIN: Primary | ICD-10-CM

## 2022-11-03 PROCEDURE — 99213 OFFICE O/P EST LOW 20 MIN: CPT | Mod: 95 | Performed by: FAMILY MEDICINE

## 2022-11-03 RX ORDER — DEXTROAMPHETAMINE SACCHARATE, AMPHETAMINE ASPARTATE MONOHYDRATE, DEXTROAMPHETAMINE SULFATE AND AMPHETAMINE SULFATE 2.5; 2.5; 2.5; 2.5 MG/1; MG/1; MG/1; MG/1
10 CAPSULE, EXTENDED RELEASE ORAL DAILY
COMMUNITY
End: 2022-12-13

## 2022-11-03 RX ORDER — DEXTROAMPHETAMINE SACCHARATE, AMPHETAMINE ASPARTATE MONOHYDRATE, DEXTROAMPHETAMINE SULFATE AND AMPHETAMINE SULFATE 2.5; 2.5; 2.5; 2.5 MG/1; MG/1; MG/1; MG/1
10 CAPSULE, EXTENDED RELEASE ORAL DAILY
Qty: 30 CAPSULE | Refills: 0 | Status: SHIPPED | OUTPATIENT
Start: 2022-12-04 | End: 2023-01-03

## 2022-11-03 RX ORDER — DEXTROAMPHETAMINE SACCHARATE, AMPHETAMINE ASPARTATE MONOHYDRATE, DEXTROAMPHETAMINE SULFATE AND AMPHETAMINE SULFATE 2.5; 2.5; 2.5; 2.5 MG/1; MG/1; MG/1; MG/1
10 CAPSULE, EXTENDED RELEASE ORAL DAILY
Qty: 30 CAPSULE | Refills: 0 | Status: SHIPPED | OUTPATIENT
Start: 2022-11-03 | End: 2022-12-03

## 2022-11-03 RX ORDER — DEXTROAMPHETAMINE SACCHARATE, AMPHETAMINE ASPARTATE MONOHYDRATE, DEXTROAMPHETAMINE SULFATE AND AMPHETAMINE SULFATE 2.5; 2.5; 2.5; 2.5 MG/1; MG/1; MG/1; MG/1
10 CAPSULE, EXTENDED RELEASE ORAL DAILY
Qty: 30 CAPSULE | Refills: 0 | Status: SHIPPED | OUTPATIENT
Start: 2023-01-04 | End: 2023-02-03

## 2022-11-03 RX ORDER — DEXTROAMPHETAMINE SACCHARATE, AMPHETAMINE ASPARTATE, DEXTROAMPHETAMINE SULFATE AND AMPHETAMINE SULFATE 1.25; 1.25; 1.25; 1.25 MG/1; MG/1; MG/1; MG/1
5 TABLET ORAL PRN
COMMUNITY
End: 2023-01-24

## 2022-11-03 NOTE — PROGRESS NOTES
Michelle is a 27 year old who is being evaluated via a billable video visit.      How would you like to obtain your AVS? MyChart  If the video visit is dropped, the invitation should be resent by: Text to cell phone: [phone]  Will anyone else be joining your video visit? No      Assessment & Plan     Tonsil pain  Recurring tonsil pain. Discussed that this is likely normal response to illness any may not need removal as she isn't having recurrent strep throat infections or other issues like sleep apnea. Will provide referral for further evaluation.  - Adult ENT  Referral; Future    Attention deficit hyperactivity disorder (ADHD), unspecified ADHD type  Medication working well without adverse effects. Continue current regimen.  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  PHQ 5/19/2021 10/11/2021 6/21/2022   PHQ-9 Total Score 4 6 4   Q9: Thoughts of better off dead/self-harm past 2 weeks Not at all Not at all Not at all   F/U: Thoughts of suicide or self-harm - - -   F/U: Safety concerns - - -           Return in about 3 months (around 2/3/2023) for follow up ADHD.    Raji Baker, [facility]   Michelle is a 27 year old, presenting for the following health issues:  No chief complaint on file.      HPI     Medication Followup of Adderall     Taking Medication as prescribed: yes    Side Effects:  None    Medication Helping Symptoms:  Yes    Denies any headaches, tachycardia, palpitations, numbness/tingling, insomnia, irritability, mood changes, tics, sweating, weight loss, dry  mouth      Tonsils: she states they commonly get enlarged and sore -- whenever she gets sick. Wondering about having them removed.      Review of Systems   Constitutional, HEENT, cardiovascular, pulmonary, gi and gu systems are negative, except as otherwise noted.      Objective           Vitals:  No vitals were obtained today due to virtual visit.    Physical Exam   GENERAL: Healthy, alert and no distress  EYES: Eyes grossly normal to inspection.  No discharge or erythema, or obvious scleral/conjunctival abnormalities.  RESP: No audible wheeze, cough, or visible cyanosis.  No visible retractions or increased work of breathing.    SKIN: Visible skin clear. No significant rash, abnormal pigmentation or lesions.  NEURO: Cranial nerves grossly intact.  Mentation and speech appropriate for age.  PSYCH: Mentation appears normal, affect normal/bright, judgement and insight intact, normal speech and appearance well-groomed.          Video-Visit Details    Video Start Time: 1:15 PM    Type of service:  Video Visit    Video End Time:1:21 PM    Originating Location (pt. Location): Home    Distant Location (provider location):  On-site    Platform used for Video Visit: Cynthia

## 2022-11-07 ENCOUNTER — TELEPHONE (OUTPATIENT)
Dept: FAMILY MEDICINE | Facility: CLINIC | Age: 27
End: 2022-11-07

## 2022-11-07 NOTE — TELEPHONE ENCOUNTER
Prior Authorization Retail Medication Request    Medication/Dose: Amphetamine-Dextroamphet ER r 10MG   ICD code (if different than what is on RX):    Previously Tried and Failed:    Rationale:      Insurance Name:  In chart  Insurance ID:        Pharmacy Information (if different than what is on RX)  Name:  [facility]  Phone:  [phone]  Cover My Meds Key: [id number]     Plan does not cover, please change Rx or advise to start a ABBI Smith

## 2022-11-07 NOTE — TELEPHONE ENCOUNTER
Reason for Call:  Form, our goal is to have forms completed with 72 hours, however, some forms may require a visit or additional information.    Type of letter, form or note:  Prior Auth    Who is the form from?: [facility] (if other please explain)    Where did the form come from: form was faxed in    What clinic location was the form placed at?: [facility]    Where the form was placed: Dr Baker Box/Folder    What number is listed as a contact on the form?: [phone]           Call taken on 11/7/2022 at 4:30 PM by Katy Lucia

## 2022-11-09 NOTE — TELEPHONE ENCOUNTER
Central Prior Authorization Team   Phone: [phone]      Prior Authorization Not Needed per Insurance    Medication: Adderall xr-PA NOT NEEDED  Insurance Company: [employer] - Phone [phone] Fax [phone]  Expected CoPay:      Pharmacy Filling the Rx: [id number] - [place], [place] - [address] AT [facility] [address]   Pharmacy Notified: Yes  Patient Notified: No    Insurance prefers Brand.  Called pharmacy, they switched and received a paid claim. **Instructed pharmacy to notify patient when script is ready to /ship.**

## 2022-12-13 ENCOUNTER — IMMUNIZATION (OUTPATIENT)
Dept: FAMILY MEDICINE | Facility: CLINIC | Age: 27
End: 2022-12-13
Payer: COMMERCIAL

## 2022-12-13 ENCOUNTER — VIRTUAL VISIT (OUTPATIENT)
Dept: FAMILY MEDICINE | Facility: CLINIC | Age: 27
End: 2022-12-13
Payer: COMMERCIAL

## 2022-12-13 DIAGNOSIS — F90.9 ATTENTION DEFICIT HYPERACTIVITY DISORDER (ADHD), UNSPECIFIED ADHD TYPE: ICD-10-CM

## 2022-12-13 DIAGNOSIS — Z23 NEED FOR PROPHYLACTIC VACCINATION AND INOCULATION AGAINST INFLUENZA: ICD-10-CM

## 2022-12-13 DIAGNOSIS — F32.1 CURRENT MODERATE EPISODE OF MAJOR DEPRESSIVE DISORDER WITHOUT PRIOR EPISODE (H): Primary | ICD-10-CM

## 2022-12-13 DIAGNOSIS — Z23 HIGH PRIORITY FOR 2019-NCOV VACCINE: ICD-10-CM

## 2022-12-13 PROCEDURE — 0134A COVID-19 VACCINE BIVALENT BOOSTER 18+ (MODERNA): CPT

## 2022-12-13 PROCEDURE — 90686 IIV4 VACC NO PRSV 0.5 ML IM: CPT

## 2022-12-13 PROCEDURE — 99214 OFFICE O/P EST MOD 30 MIN: CPT | Mod: 93 | Performed by: FAMILY MEDICINE

## 2022-12-13 PROCEDURE — 96127 BRIEF EMOTIONAL/BEHAV ASSMT: CPT | Mod: 95 | Performed by: FAMILY MEDICINE

## 2022-12-13 PROCEDURE — 91313 COVID-19 VACCINE BIVALENT BOOSTER 18+ (MODERNA): CPT

## 2022-12-13 PROCEDURE — 90471 IMMUNIZATION ADMIN: CPT

## 2022-12-13 RX ORDER — BUPROPION HYDROCHLORIDE 150 MG/1
150 TABLET ORAL EVERY MORNING
Qty: 30 TABLET | Refills: 0 | Status: SHIPPED | OUTPATIENT
Start: 2022-12-13 | End: 2023-06-15

## 2022-12-13 ASSESSMENT — ANXIETY QUESTIONNAIRES
GAD7 TOTAL SCORE: 6
5. BEING SO RESTLESS THAT IT IS HARD TO SIT STILL: SEVERAL DAYS
2. NOT BEING ABLE TO STOP OR CONTROL WORRYING: SEVERAL DAYS
7. FEELING AFRAID AS IF SOMETHING AWFUL MIGHT HAPPEN: NOT AT ALL
GAD7 TOTAL SCORE: 6
IF YOU CHECKED OFF ANY PROBLEMS ON THIS QUESTIONNAIRE, HOW DIFFICULT HAVE THESE PROBLEMS MADE IT FOR YOU TO DO YOUR WORK, TAKE CARE OF THINGS AT HOME, OR GET ALONG WITH OTHER PEOPLE: SOMEWHAT DIFFICULT
4. TROUBLE RELAXING: SEVERAL DAYS
3. WORRYING TOO MUCH ABOUT DIFFERENT THINGS: SEVERAL DAYS
6. BECOMING EASILY ANNOYED OR IRRITABLE: SEVERAL DAYS
7. FEELING AFRAID AS IF SOMETHING AWFUL MIGHT HAPPEN: NOT AT ALL
8. IF YOU CHECKED OFF ANY PROBLEMS, HOW DIFFICULT HAVE THESE MADE IT FOR YOU TO DO YOUR WORK, TAKE CARE OF THINGS AT HOME, OR GET ALONG WITH OTHER PEOPLE?: SOMEWHAT DIFFICULT
GAD7 TOTAL SCORE: 6
1. FEELING NERVOUS, ANXIOUS, OR ON EDGE: SEVERAL DAYS

## 2022-12-13 ASSESSMENT — PATIENT HEALTH QUESTIONNAIRE - PHQ9
SUM OF ALL RESPONSES TO PHQ QUESTIONS 1-9: 7
SUM OF ALL RESPONSES TO PHQ QUESTIONS 1-9: 7
10. IF YOU CHECKED OFF ANY PROBLEMS, HOW DIFFICULT HAVE THESE PROBLEMS MADE IT FOR YOU TO DO YOUR WORK, TAKE CARE OF THINGS AT HOME, OR GET ALONG WITH OTHER PEOPLE: SOMEWHAT DIFFICULT

## 2022-12-13 NOTE — PROGRESS NOTES
Michelle is a 27 year old who is being evaluated via a billable telephone visit.      What phone number would you like to be contacted at? [phone]  How would you like to obtain your AVS? Ryan  Distant Location (provider location):  On-site    Assessment & Plan     1. Current moderate episode of major depressive disorder without prior episode (H)  Start Wellbutrin. Follow up in 2 1 month and see how she is feeling with medication.  - buPROPion (WELLBUTRIN XL) 150 MG 24 hr tablet; Take 1 tablet (150 mg) by mouth every morning  Dispense: 30 tablet; Refill: 0    2. Attention deficit hyperactivity disorder (ADHD), unspecified ADHD type   stable, continue adderall xr 10 mg daily.      No follow-ups on file.    Raji Baker DO  [facility]   Michelle is a 27 year old, presenting for the following health issues:  Recheck Medication    History of Present Illness       Mental Health Follow-up:  Patient presents to follow-up on Depression & Anxiety.Patient's depression since last visit has been:  Medium  The patient is not having other symptoms associated with depression.  Patient's anxiety since last visit has been:  Medium  The patient is not having other symptoms associated with anxiety.  Any significant life events: No  Patient is not feeling anxious or having panic attacks.  Patient has no concerns about alcohol or drug use.    She eats 2-3 servings of fruits and vegetables daily.She consumes 2 sweetened beverage(s) daily.She exercises with enough effort to increase her heart rate 30 to 60 minutes per day.  She exercises with enough effort to increase her heart rate 3 or less days per week.   She is taking medications regularly.    Today's PHQ-9         PHQ-9 Total Score: 7    PHQ-9 Q9 Thoughts of better off dead/self-harm past 2 weeks :   Not at all    How difficult have these problems made it for you to do your work, take care of things at home, or get along with other  people: Somewhat difficult  Today's CAL-7 Score: 6       Medication Check - ADHD - stable with Adderall XR 10 mg daily.       Review of Systems   Constitutional, HEENT, cardiovascular, pulmonary, gi and gu systems are negative, except as otherwise noted.      Objective         Vitals:  No vitals were obtained today due to virtual visit.    Physical Exam   healthy, alert and no distress  PSYCH: Alert and oriented times 3; coherent speech, normal   rate and volume, able to articulate logical thoughts, able   to abstract reason, no tangential thoughts, no hallucinations   or delusions  Her affect is normal  RESP: No cough, no audible wheezing, able to talk in full sentences  Remainder of exam unable to be completed due to telephone visits            Phone call duration: 9 minutes

## 2023-01-16 ASSESSMENT — PATIENT HEALTH QUESTIONNAIRE - PHQ9
10. IF YOU CHECKED OFF ANY PROBLEMS, HOW DIFFICULT HAVE THESE PROBLEMS MADE IT FOR YOU TO DO YOUR WORK, TAKE CARE OF THINGS AT HOME, OR GET ALONG WITH OTHER PEOPLE: SOMEWHAT DIFFICULT
SUM OF ALL RESPONSES TO PHQ QUESTIONS 1-9: 9
SUM OF ALL RESPONSES TO PHQ QUESTIONS 1-9: 9

## 2023-01-16 ASSESSMENT — ANXIETY QUESTIONNAIRES
2. NOT BEING ABLE TO STOP OR CONTROL WORRYING: SEVERAL DAYS
GAD7 TOTAL SCORE: 6
4. TROUBLE RELAXING: SEVERAL DAYS
3. WORRYING TOO MUCH ABOUT DIFFERENT THINGS: SEVERAL DAYS
GAD7 TOTAL SCORE: 6
1. FEELING NERVOUS, ANXIOUS, OR ON EDGE: SEVERAL DAYS
8. IF YOU CHECKED OFF ANY PROBLEMS, HOW DIFFICULT HAVE THESE MADE IT FOR YOU TO DO YOUR WORK, TAKE CARE OF THINGS AT HOME, OR GET ALONG WITH OTHER PEOPLE?: SOMEWHAT DIFFICULT
GAD7 TOTAL SCORE: 6
5. BEING SO RESTLESS THAT IT IS HARD TO SIT STILL: SEVERAL DAYS
7. FEELING AFRAID AS IF SOMETHING AWFUL MIGHT HAPPEN: NOT AT ALL
6. BECOMING EASILY ANNOYED OR IRRITABLE: SEVERAL DAYS
7. FEELING AFRAID AS IF SOMETHING AWFUL MIGHT HAPPEN: NOT AT ALL
IF YOU CHECKED OFF ANY PROBLEMS ON THIS QUESTIONNAIRE, HOW DIFFICULT HAVE THESE PROBLEMS MADE IT FOR YOU TO DO YOUR WORK, TAKE CARE OF THINGS AT HOME, OR GET ALONG WITH OTHER PEOPLE: SOMEWHAT DIFFICULT

## 2023-01-24 ENCOUNTER — VIRTUAL VISIT (OUTPATIENT)
Dept: FAMILY MEDICINE | Facility: CLINIC | Age: 28
End: 2023-01-24
Payer: COMMERCIAL

## 2023-01-24 DIAGNOSIS — F32.1 MODERATE MAJOR DEPRESSION (H): Primary | ICD-10-CM

## 2023-01-24 DIAGNOSIS — F90.9 ATTENTION DEFICIT HYPERACTIVITY DISORDER (ADHD), UNSPECIFIED ADHD TYPE: ICD-10-CM

## 2023-01-24 PROCEDURE — 99214 OFFICE O/P EST MOD 30 MIN: CPT | Mod: 95 | Performed by: FAMILY MEDICINE

## 2023-01-24 RX ORDER — DEXTROAMPHETAMINE SACCHARATE, AMPHETAMINE ASPARTATE MONOHYDRATE, DEXTROAMPHETAMINE SULFATE AND AMPHETAMINE SULFATE 2.5; 2.5; 2.5; 2.5 MG/1; MG/1; MG/1; MG/1
10 CAPSULE, EXTENDED RELEASE ORAL DAILY
Qty: 30 CAPSULE | Refills: 0 | Status: SHIPPED | OUTPATIENT
Start: 2023-01-24 | End: 2023-02-23

## 2023-01-24 RX ORDER — DEXTROAMPHETAMINE SACCHARATE, AMPHETAMINE ASPARTATE MONOHYDRATE, DEXTROAMPHETAMINE SULFATE AND AMPHETAMINE SULFATE 2.5; 2.5; 2.5; 2.5 MG/1; MG/1; MG/1; MG/1
10 CAPSULE, EXTENDED RELEASE ORAL DAILY
Qty: 30 CAPSULE | Refills: 0 | Status: SHIPPED | OUTPATIENT
Start: 2023-03-27 | End: 2023-04-26

## 2023-01-24 RX ORDER — BUPROPION HYDROCHLORIDE 300 MG/1
300 TABLET ORAL EVERY MORNING
Qty: 30 TABLET | Refills: 0 | Status: SHIPPED | OUTPATIENT
Start: 2023-01-24 | End: 2023-03-08

## 2023-01-24 RX ORDER — DEXTROAMPHETAMINE SACCHARATE, AMPHETAMINE ASPARTATE MONOHYDRATE, DEXTROAMPHETAMINE SULFATE AND AMPHETAMINE SULFATE 2.5; 2.5; 2.5; 2.5 MG/1; MG/1; MG/1; MG/1
10 CAPSULE, EXTENDED RELEASE ORAL DAILY
Qty: 30 CAPSULE | Refills: 0 | Status: SHIPPED | OUTPATIENT
Start: 2023-02-24 | End: 2023-03-26

## 2023-01-24 ASSESSMENT — PATIENT HEALTH QUESTIONNAIRE - PHQ9
SUM OF ALL RESPONSES TO PHQ QUESTIONS 1-9: 9
10. IF YOU CHECKED OFF ANY PROBLEMS, HOW DIFFICULT HAVE THESE PROBLEMS MADE IT FOR YOU TO DO YOUR WORK, TAKE CARE OF THINGS AT HOME, OR GET ALONG WITH OTHER PEOPLE: SOMEWHAT DIFFICULT

## 2023-01-24 ASSESSMENT — ANXIETY QUESTIONNAIRES: GAD7 TOTAL SCORE: 6

## 2023-01-24 NOTE — PROGRESS NOTES
Michelle is a 27 year old who is being evaluated via a billable video visit.      How would you like to obtain your AVS? MyChart  If the video visit is dropped, the invitation should be resent by: MY CHART    Will anyone else be joining your video visit? No      Assessment & Plan     Moderate major depression (H)  Will try increasing Wellbutrin dose to 300 mg daily. If symptoms not improving or if noticing side effects, will change to Lexapro which she has taken in the past.  - buPROPion (WELLBUTRIN XL) 300 MG 24 hr tablet; Take 1 tablet (300 mg) by mouth every morning    Attention deficit hyperactivity disorder (ADHD), unspecified ADHD type  Medication working well. Continue current regimen.  - amphetamine-dextroamphetamine (ADDERALL XR) 10 MG 24 hr capsule; Take 1 capsule (10 mg) by mouth daily for 30 days  - amphetamine-dextroamphetamine (ADDERALL XR) 10 MG 24 hr capsule; Take 1 capsule (10 mg) by mouth daily for 30 days  - amphetamine-dextroamphetamine (ADDERALL XR) 10 MG 24 hr capsule; Take 1 capsule (10 mg) by mouth daily for 30 days        Return in about 1 month (around 2/24/2023) for follow up on mood, med check.    Raji Baker [facility]   Michelle is a 27 year old, presenting for the following health issues:  Recheck Medication      History of Present Illness       Mental Health Follow-up:  Patient presents to follow-up on Depression & Anxiety.Patient's depression since last visit has been:  No change  The patient is not having other symptoms associated with depression.  Patient's anxiety since last visit has been:  No change  The patient is not having other symptoms associated with anxiety.  Any significant life events: No  Patient is not feeling anxious or having panic attacks.  Patient has no concerns about alcohol or drug use.    She eats 4 or more servings of fruits and vegetables daily.She consumes 3 sweetened beverage(s) daily.She exercises with enough  effort to increase her heart rate 60 or more minutes per day.  She exercises with enough effort to increase her heart rate 7 days per week. She is missing 3 dose(s) of medications per week.  She is not taking prescribed medications regularly due to side effects and other.    Today's PHQ-9         PHQ-9 Total Score: 9    PHQ-9 Q9 Thoughts of better off dead/self-harm past 2 weeks :   Not at all    How difficult have these problems made it for you to do your work, take care of things at home, or get along with other people: Somewhat difficult  Today's CAL-7 Score: 6    She has been taking Wellbutrin consistently for the past month but hasn't noticed any benefit. Denies any side effects either. Mood is stable. No recent life changes/events.      Adderall: taking 10 mg of XR formulation daily on days she works. Works 7 on 7 off. Working well. Denies any headaches, tachycardia, palpitations, numbness/tingling, insomnia, irritability, mood changes, tics, sweating, weight loss, dry mouth      Review of Systems   Constitutional, HEENT, cardiovascular, pulmonary, gi and gu systems are negative, except as otherwise noted.      Objective           Vitals:  No vitals were obtained today due to virtual visit.    Physical Exam   GENERAL: Healthy, alert and no distress  EYES: Eyes grossly normal to inspection.  No discharge or erythema, or obvious scleral/conjunctival abnormalities.  RESP: No audible wheeze, cough, or visible cyanosis.  No visible retractions or increased work of breathing.    SKIN: Visible skin clear. No significant rash, abnormal pigmentation or lesions.  NEURO: Cranial nerves grossly intact.  Mentation and speech appropriate for age.  PSYCH: Mentation appears normal, affect normal/bright, judgement and insight intact, normal speech and appearance well-groomed.            Video-Visit Details    Type of service:  Video Visit   Video Start Time: 3:37 PM  Video End Time: 3:51 PM    Originating Location (pt.  Location): Home  Distant Location (provider location):  On-site  Platform used for Video Visit: Cynthia

## 2023-02-01 PROBLEM — F32.1 CURRENT MODERATE EPISODE OF MAJOR DEPRESSIVE DISORDER WITHOUT PRIOR EPISODE (H): Status: ACTIVE | Noted: 2023-02-01

## 2023-03-02 DIAGNOSIS — J30.2 SEASONAL ALLERGIC RHINITIS, UNSPECIFIED TRIGGER: ICD-10-CM

## 2023-03-02 DIAGNOSIS — F32.1 MODERATE MAJOR DEPRESSION (H): ICD-10-CM

## 2023-03-03 RX ORDER — CETIRIZINE HYDROCHLORIDE 10 MG/1
TABLET ORAL
Qty: 90 TABLET | Refills: 1 | Status: SHIPPED | OUTPATIENT
Start: 2023-03-03 | End: 2023-07-17

## 2023-03-04 NOTE — TELEPHONE ENCOUNTER
Prescription approved per [facility] Refill Protocol.  Leona Kuhn RN

## 2023-03-06 NOTE — TELEPHONE ENCOUNTER
Routing refill request to provider for review/approval because:  Labs out of range:    PHQ 12/13/2022 1/16/2023 1/16/2023   PHQ-9 Total Score 7 9 9   Q9: Thoughts of better off dead/self-harm past 2 weeks Not at all Not at all Not at all   F/U: Thoughts of suicide or self-harm - - -   F/U: Safety concerns - - -     Blane KILPATRICK RN, BSN

## 2023-03-08 RX ORDER — BUPROPION HYDROCHLORIDE 300 MG/1
TABLET ORAL
Qty: 30 TABLET | Refills: 0 | Status: SHIPPED | OUTPATIENT
Start: 2023-03-08 | End: 2023-06-15

## 2023-04-17 ENCOUNTER — TELEPHONE (OUTPATIENT)
Dept: FAMILY MEDICINE | Facility: CLINIC | Age: 28
End: 2023-04-17
Payer: COMMERCIAL

## 2023-04-17 NOTE — LETTER
[place] [address] [address]  [phone]            April 20, 2023    Mica Roy                                                                                                                                                       [id number] [facility] [address]              Dear Mica,    This letter is to inform you we have attempted to call you a couple of times regarding your medication Bupropion  Please call to speak with an [phone] or send us a [facility] message so we can discuss further.     Sincerely,          Raji Baker DO / [facility]

## 2023-04-17 NOTE — TELEPHONE ENCOUNTER
"What does \"just did not work for her\" mean? Side effects?     Attempt # 1    Called #   Telephone Information:   Mobile [phone]         Left a non detailed VM     Marybeth Arboleda RN, BSN  [facility]         "

## 2023-04-17 NOTE — TELEPHONE ENCOUNTER
FYI - Status Update    Who is Calling: patient    Update: Would like to cancel Bupropion refill request. It just did not work for her.     Does caller want a call/response back: No

## 2023-04-19 NOTE — TELEPHONE ENCOUNTER
Attempt # 2    Called #   Telephone Information:   Mobile [phone]       Left a non detailed VM     Marybeth Arboleda RN, BSN  [facility]

## 2023-04-20 NOTE — TELEPHONE ENCOUNTER
Yes, please send letter and close encounter as patent has not responded.    Jana [facility] RN [facility]

## 2023-06-15 ENCOUNTER — VIRTUAL VISIT (OUTPATIENT)
Dept: FAMILY MEDICINE | Facility: CLINIC | Age: 28
End: 2023-06-15
Payer: COMMERCIAL

## 2023-06-15 DIAGNOSIS — F90.9 ATTENTION DEFICIT HYPERACTIVITY DISORDER (ADHD), UNSPECIFIED ADHD TYPE: Primary | ICD-10-CM

## 2023-06-15 DIAGNOSIS — F41.1 GENERALIZED ANXIETY DISORDER: ICD-10-CM

## 2023-06-15 DIAGNOSIS — F32.1 MODERATE MAJOR DEPRESSION (H): ICD-10-CM

## 2023-06-15 PROCEDURE — 99214 OFFICE O/P EST MOD 30 MIN: CPT | Mod: 95 | Performed by: FAMILY MEDICINE

## 2023-06-15 PROCEDURE — 96127 BRIEF EMOTIONAL/BEHAV ASSMT: CPT | Mod: 59 | Performed by: FAMILY MEDICINE

## 2023-06-15 RX ORDER — DEXTROAMPHETAMINE SACCHARATE, AMPHETAMINE ASPARTATE MONOHYDRATE, DEXTROAMPHETAMINE SULFATE AND AMPHETAMINE SULFATE 2.5; 2.5; 2.5; 2.5 MG/1; MG/1; MG/1; MG/1
10 CAPSULE, EXTENDED RELEASE ORAL DAILY
Qty: 30 CAPSULE | Refills: 0 | Status: SHIPPED | OUTPATIENT
Start: 2023-08-16 | End: 2023-09-15

## 2023-06-15 RX ORDER — DEXTROAMPHETAMINE/AMPHETAMINE 10 MG
1 CAPSULE, EXT RELEASE 24 HR ORAL DAILY
COMMUNITY
Start: 2023-06-01 | End: 2023-07-17

## 2023-06-15 RX ORDER — DEXTROAMPHETAMINE SACCHARATE, AMPHETAMINE ASPARTATE MONOHYDRATE, DEXTROAMPHETAMINE SULFATE AND AMPHETAMINE SULFATE 2.5; 2.5; 2.5; 2.5 MG/1; MG/1; MG/1; MG/1
10 CAPSULE, EXTENDED RELEASE ORAL DAILY
Qty: 30 CAPSULE | Refills: 0 | Status: SHIPPED | OUTPATIENT
Start: 2023-07-16 | End: 2023-08-15

## 2023-06-15 RX ORDER — DEXTROAMPHETAMINE/AMPHETAMINE 10 MG
10 CAPSULE, EXT RELEASE 24 HR ORAL DAILY
Status: CANCELLED | OUTPATIENT
Start: 2023-06-15

## 2023-06-15 RX ORDER — ESCITALOPRAM OXALATE 10 MG/1
TABLET ORAL
Qty: 38 TABLET | Refills: 0 | Status: SHIPPED | OUTPATIENT
Start: 2023-06-15 | End: 2023-07-17

## 2023-06-15 RX ORDER — DEXTROAMPHETAMINE SACCHARATE, AMPHETAMINE ASPARTATE MONOHYDRATE, DEXTROAMPHETAMINE SULFATE AND AMPHETAMINE SULFATE 2.5; 2.5; 2.5; 2.5 MG/1; MG/1; MG/1; MG/1
10 CAPSULE, EXTENDED RELEASE ORAL DAILY
Qty: 30 CAPSULE | Refills: 0 | Status: SHIPPED | OUTPATIENT
Start: 2023-06-15 | End: 2023-07-15

## 2023-06-15 ASSESSMENT — PATIENT HEALTH QUESTIONNAIRE - PHQ9
SUM OF ALL RESPONSES TO PHQ QUESTIONS 1-9: 11
5. POOR APPETITE OR OVEREATING: NEARLY EVERY DAY

## 2023-06-15 ASSESSMENT — ANXIETY QUESTIONNAIRES
5. BEING SO RESTLESS THAT IT IS HARD TO SIT STILL: MORE THAN HALF THE DAYS
7. FEELING AFRAID AS IF SOMETHING AWFUL MIGHT HAPPEN: NOT AT ALL
6. BECOMING EASILY ANNOYED OR IRRITABLE: MORE THAN HALF THE DAYS
2. NOT BEING ABLE TO STOP OR CONTROL WORRYING: NEARLY EVERY DAY
IF YOU CHECKED OFF ANY PROBLEMS ON THIS QUESTIONNAIRE, HOW DIFFICULT HAVE THESE PROBLEMS MADE IT FOR YOU TO DO YOUR WORK, TAKE CARE OF THINGS AT HOME, OR GET ALONG WITH OTHER PEOPLE: SOMEWHAT DIFFICULT
1. FEELING NERVOUS, ANXIOUS, OR ON EDGE: NOT AT ALL
GAD7 TOTAL SCORE: 13
3. WORRYING TOO MUCH ABOUT DIFFERENT THINGS: NEARLY EVERY DAY
GAD7 TOTAL SCORE: 13

## 2023-06-15 NOTE — PROGRESS NOTES
Michelle is a 27 year old who is being evaluated via a billable telephone visit.      What phone number would you like to be contacted at? [phone]  How would you like to obtain your AVS? Ryan  Distant Location (provider location):  On-site    Assessment & Plan     Attention deficit hyperactivity disorder (ADHD), unspecified ADHD type  Medication working well. Continue current dose.  - amphetamine-dextroamphetamine (ADDERALL XR) 10 MG 24 hr capsule; Take 1 capsule (10 mg) by mouth daily for 30 days  - amphetamine-dextroamphetamine (ADDERALL XR) 10 MG 24 hr capsule; Take 1 capsule (10 mg) by mouth daily for 30 days  - amphetamine-dextroamphetamine (ADDERALL XR) 10 MG 24 hr capsule; Take 1 capsule (10 mg) by mouth daily for 30 days    Moderate major depression (H)  Mood and anxiety have been worse. Wellbutrin ineffective. Restart Lexapro as this was helpful in the past. Follow up in 4 weeks to recheck mood.  - escitalopram (LEXAPRO) 10 MG tablet; Take 1 tablet (10 mg) by mouth daily for 14 days, THEN 1.5 tablets (15 mg) daily for 16 days.    Generalized anxiety disorder  - escitalopram (LEXAPRO) 10 MG tablet; Take 1 tablet (10 mg) by mouth daily for 14 days, THEN 1.5 tablets (15 mg) daily for 16 days.         Raji Baker [facility]   Michelle is a 27 year old, presenting for the following health issues:  Recheck Medication (Patient requesting to start Lexapro?) and Refill Request        6/15/2023     1:04 PM   Additional Questions   Roomed by Sanket HOOK CMA     HPI     Medication Followup for: ADHD - ADDERALL XR 10 MG 24 hr capsule Sig - Route: Take 1 capsule by mouth daily - Oral      Taking Medication as prescribed: yes    Side Effects:  None    Medication Helping Symptoms:  yes      Depression and Anxiety Follow-Up    How are you doing with your depression since your last visit? Worsened     How are you doing with your anxiety since your last visit?  Worsened     Are  you having other symptoms that might be associated with depression or anxiety? No    Have you had a significant life event? No     Do you have any concerns with your use of alcohol or other drugs? No    Wellbutrin was not helpful. No side effects. She has been having increased anxiety lately.  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       Social History     Tobacco Use     Smoking status: Never     Smokeless tobacco: Never   Substance Use Topics     Alcohol use: No     Drug use: No         12/13/2022     2:33 PM 1/16/2023     2:39 PM 6/15/2023     1:07 PM   PHQ   PHQ-9 Total Score 7 9    9 11   Q9: Thoughts of better off dead/self-harm past 2 weeks Not at all Not at all    Not at all Not at all         12/13/2022     2:33 PM 1/16/2023     2:39 PM 6/15/2023     1:07 PM   CAL-7 SCORE   Total Score 6 (mild anxiety) 6 (mild anxiety)    Total Score 6 6    6 13         6/15/2023     1:07 PM   Last PHQ-9   1.  Little interest or pleasure in doing things 2   2.  Feeling down, depressed, or hopeless 2   3.  Trouble falling or staying asleep, or sleeping too much 0   4.  Feeling tired or having little energy 2   5.  Poor appetite or overeating 0   6.  Feeling bad about yourself 2   7.  Trouble concentrating 3   8.  Moving slowly or restless 0   Q9: Thoughts of better off dead/self-harm past 2 weeks 0   PHQ-9 Total Score 11   Difficulty at work, home, or with people Somewhat difficult         6/15/2023     1:07 PM   CAL-7    1. Feeling nervous, anxious, or on edge 0   2. Not being able to stop or control worrying 3   3. Worrying too much about different things 3   4. Trouble relaxing 3   5. Being so restless that it is hard to sit still 2   6. Becoming easily annoyed or irritable 2   7. Feeling afraid, as if something awful might happen 0   CAL-7 Total Score 13   If you checked any problems, how difficult have they made it for you to do your work, take care of things at home, or get along with other people?  Somewhat difficult       Suicide Assessment Five-step Evaluation and Treatment (SAFE-T)        How many servings of fruits and vegetables do you eat daily?  4 or more    On average, how many sweetened beverages do you drink each day (Examples: soda, juice, sweet tea, etc.  Do NOT count diet or artificially sweetened beverages)?   0    How many days per week do you exercise enough to make your heart beat faster? 3 or less    How many minutes a day do you exercise enough to make your heart beat faster? 30 - 60    How many days per week do you miss taking your medication? 0          Review of Systems   Constitutional, HEENT, cardiovascular, pulmonary, gi and gu systems are negative, except as otherwise noted.      Objective       Vitals:  No vitals were obtained today due to virtual visit.    Physical Exam   healthy, alert and no distress  PSYCH: Alert and oriented times 3; coherent speech, normal   rate and volume, able to articulate logical thoughts, able   to abstract reason, no tangential thoughts, no hallucinations   or delusions  Her affect is normal  RESP: No cough, no audible wheezing, able to talk in full sentences  Remainder of exam unable to be completed due to telephone visits            Phone call duration: 7 minutes

## 2023-07-17 ENCOUNTER — VIRTUAL VISIT (OUTPATIENT)
Dept: FAMILY MEDICINE | Facility: CLINIC | Age: 28
End: 2023-07-17
Attending: FAMILY MEDICINE
Payer: COMMERCIAL

## 2023-07-17 DIAGNOSIS — F33.1 MODERATE EPISODE OF RECURRENT MAJOR DEPRESSIVE DISORDER (H): ICD-10-CM

## 2023-07-17 DIAGNOSIS — J30.2 SEASONAL ALLERGIC RHINITIS, UNSPECIFIED TRIGGER: ICD-10-CM

## 2023-07-17 DIAGNOSIS — F90.9 ATTENTION DEFICIT HYPERACTIVITY DISORDER (ADHD), UNSPECIFIED ADHD TYPE: ICD-10-CM

## 2023-07-17 DIAGNOSIS — F41.1 GENERALIZED ANXIETY DISORDER: ICD-10-CM

## 2023-07-17 PROCEDURE — 99214 OFFICE O/P EST MOD 30 MIN: CPT | Mod: 95 | Performed by: FAMILY MEDICINE

## 2023-07-17 RX ORDER — CETIRIZINE HYDROCHLORIDE 10 MG/1
10 TABLET ORAL DAILY
Qty: 90 TABLET | Refills: 1 | Status: SHIPPED | OUTPATIENT
Start: 2023-07-17 | End: 2023-10-24

## 2023-07-17 RX ORDER — BUPROPION HYDROCHLORIDE 150 MG/1
150 TABLET ORAL EVERY MORNING
Qty: 90 TABLET | Refills: 0 | Status: SHIPPED | OUTPATIENT
Start: 2023-07-17 | End: 2023-11-24

## 2023-07-17 RX ORDER — ESCITALOPRAM OXALATE 20 MG/1
20 TABLET ORAL DAILY
Qty: 90 TABLET | Refills: 1 | Status: SHIPPED | OUTPATIENT
Start: 2023-07-17 | End: 2023-10-24

## 2023-07-17 ASSESSMENT — ANXIETY QUESTIONNAIRES
3. WORRYING TOO MUCH ABOUT DIFFERENT THINGS: SEVERAL DAYS
5. BEING SO RESTLESS THAT IT IS HARD TO SIT STILL: SEVERAL DAYS
GAD7 TOTAL SCORE: 6
7. FEELING AFRAID AS IF SOMETHING AWFUL MIGHT HAPPEN: NOT AT ALL
2. NOT BEING ABLE TO STOP OR CONTROL WORRYING: SEVERAL DAYS
GAD7 TOTAL SCORE: 6
6. BECOMING EASILY ANNOYED OR IRRITABLE: SEVERAL DAYS
IF YOU CHECKED OFF ANY PROBLEMS ON THIS QUESTIONNAIRE, HOW DIFFICULT HAVE THESE PROBLEMS MADE IT FOR YOU TO DO YOUR WORK, TAKE CARE OF THINGS AT HOME, OR GET ALONG WITH OTHER PEOPLE: NOT DIFFICULT AT ALL
1. FEELING NERVOUS, ANXIOUS, OR ON EDGE: SEVERAL DAYS

## 2023-07-17 ASSESSMENT — PATIENT HEALTH QUESTIONNAIRE - PHQ9
5. POOR APPETITE OR OVEREATING: SEVERAL DAYS
SUM OF ALL RESPONSES TO PHQ QUESTIONS 1-9: 10

## 2023-07-17 NOTE — PROGRESS NOTES
Michelle is a 28 year old who is being evaluated via a billable telephone visit.      What phone number would you like to be contacted at? [phone]  How would you like to obtain your AVS? Ryan  Distant Location (provider location):  On-site    Assessment & Plan     Attention deficit hyperactivity disorder (ADHD), unspecified ADHD type  - buPROPion (WELLBUTRIN XL) 150 MG 24 hr tablet; Take 1 tablet (150 mg) by mouth every morning    Moderate episode of recurrent major depressive disorder (H)  - escitalopram (LEXAPRO) 20 MG tablet; Take 1 tablet (20 mg) by mouth daily    Generalized anxiety disorder  - escitalopram (LEXAPRO) 20 MG tablet; Take 1 tablet (20 mg) by mouth daily    Seasonal allergic rhinitis, unspecified trigger  - cetirizine (ZYRTEC) 10 MG tablet; Take 1 tablet (10 mg) by mouth daily    Overall, mood has improved since restarting Lexapro. Will refill today. Will add Wellbutrin to look for additional benefit with depression symptoms.       Depression Screening Follow Up        7/17/2023     4:46 PM   PHQ   PHQ-9 Total Score 10   Q9: Thoughts of better off dead/self-harm past 2 weeks Not at all         Raji Baker DO  [facility]   Michelle is a 28 year old, presenting for the following health issues:  Recheck Medication (Patient requesting to go back on her buPROPion (WELLBUTRIN XL)) and Refill Request        7/17/2023     4:43 PM   Additional Questions   Roomed by Sanket HOOK CMA     \[facility]\""     Depression and Anxiety Follow-Up - Patient is requesting to go back on a Rx for buPROPion (WELLBUTRIN XL), along with her escitalopram (LEXAPRO) 10 MG tablet as she states that is working pretty good.     How are you doing with your depression since your last visit? No change  How are you doing with your anxiety since your last visit?  No change  Are you having other symptoms that might be associated with depression or anxiety? No  Have you had a significant life event?  No   Do you have any concerns with your use of alcohol or other drugs? No     Lexapro is working well. Feels like her mood is better, more hopeful, less depressed, not avoidant and also has less anxiety. She would still like to try Wellbutrin as well to see if it will help more with mood.    Social History     Tobacco Use    Smoking status: Never    Smokeless tobacco: Never   Substance Use Topics    Alcohol use: No    Drug use: No         1/16/2023     2:39 PM 6/15/2023     1:07 PM 7/17/2023     4:46 PM   PHQ   PHQ-9 Total Score 9    9 11 10   Q9: Thoughts of better off dead/self-harm past 2 weeks Not at all    Not at all Not at all Not at all         1/16/2023     2:39 PM 6/15/2023     1:07 PM 7/17/2023     4:46 PM   CAL-7 SCORE   Total Score 6 (mild anxiety)     Total Score 6    6 13 6         7/17/2023     4:46 PM   Last PHQ-9   1.  Little interest or pleasure in doing things 1   2.  Feeling down, depressed, or hopeless 1   3.  Trouble falling or staying asleep, or sleeping too much 1   4.  Feeling tired or having little energy 2   5.  Poor appetite or overeating 1   6.  Feeling bad about yourself 1   7.  Trouble concentrating 2   8.  Moving slowly or restless 1   Q9: Thoughts of better off dead/self-harm past 2 weeks 0   PHQ-9 Total Score 10   Difficulty at work, home, or with people Not difficult at all         7/17/2023     4:46 PM   CAL-7    1. Feeling nervous, anxious, or on edge 1   2. Not being able to stop or control worrying 1   3. Worrying too much about different things 1   4. Trouble relaxing 1   5. Being so restless that it is hard to sit still 1   6. Becoming easily annoyed or irritable 1   7. Feeling afraid, as if something awful might happen 0   CAL-7 Total Score 6   If you checked any problems, how difficult have they made it for you to do your work, take care of things at home, or get along with other people? Not difficult at all     Suicide Assessment Five-step Evaluation and Treatment  (SAFE-T)        Review of Systems   Constitutional, HEENT, cardiovascular, pulmonary, gi and gu systems are negative, except as otherwise noted.      Objective       Vitals:  No vitals were obtained today due to virtual visit.    Physical Exam   healthy, alert and no distress  PSYCH: Alert and oriented times 3; coherent speech, normal   rate and volume, able to articulate logical thoughts, able   to abstract reason, no tangential thoughts, no hallucinations   or delusions  Her affect is normal  RESP: No cough, no audible wheezing, able to talk in full sentences  Remainder of exam unable to be completed due to telephone visits                Phone call duration: 8 minutes

## 2023-07-24 DIAGNOSIS — F41.1 GENERALIZED ANXIETY DISORDER: ICD-10-CM

## 2023-07-24 DIAGNOSIS — F32.1 MODERATE MAJOR DEPRESSION (H): ICD-10-CM

## 2023-07-25 RX ORDER — ESCITALOPRAM OXALATE 10 MG/1
TABLET ORAL
Qty: 38 TABLET | Refills: 0 | OUTPATIENT
Start: 2023-07-25

## 2023-07-25 NOTE — TELEPHONE ENCOUNTER
Just filled on 7/17/2023     Marybeth Arboleda RN, BSN  [facility]

## 2023-10-24 ENCOUNTER — VIRTUAL VISIT (OUTPATIENT)
Dept: FAMILY MEDICINE | Facility: CLINIC | Age: 28
End: 2023-10-24
Payer: COMMERCIAL

## 2023-10-24 DIAGNOSIS — F41.1 GENERALIZED ANXIETY DISORDER: ICD-10-CM

## 2023-10-24 DIAGNOSIS — F33.1 MODERATE EPISODE OF RECURRENT MAJOR DEPRESSIVE DISORDER (H): ICD-10-CM

## 2023-10-24 DIAGNOSIS — F90.9 ATTENTION DEFICIT HYPERACTIVITY DISORDER (ADHD), UNSPECIFIED ADHD TYPE: Primary | ICD-10-CM

## 2023-10-24 DIAGNOSIS — J30.2 SEASONAL ALLERGIC RHINITIS, UNSPECIFIED TRIGGER: ICD-10-CM

## 2023-10-24 PROCEDURE — 99214 OFFICE O/P EST MOD 30 MIN: CPT | Mod: VID | Performed by: FAMILY MEDICINE

## 2023-10-24 RX ORDER — ESCITALOPRAM OXALATE 20 MG/1
30 TABLET ORAL DAILY
Qty: 135 TABLET | Refills: 1 | Status: SHIPPED | OUTPATIENT
Start: 2023-10-24 | End: 2023-11-27

## 2023-10-24 RX ORDER — DEXTROAMPHETAMINE SACCHARATE, AMPHETAMINE ASPARTATE, DEXTROAMPHETAMINE SULFATE AND AMPHETAMINE SULFATE 1.25; 1.25; 1.25; 1.25 MG/1; MG/1; MG/1; MG/1
5 TABLET ORAL DAILY
Qty: 30 TABLET | Refills: 0 | Status: SHIPPED | OUTPATIENT
Start: 2023-12-25 | End: 2023-11-24

## 2023-10-24 RX ORDER — DEXTROAMPHETAMINE SACCHARATE, AMPHETAMINE ASPARTATE, DEXTROAMPHETAMINE SULFATE AND AMPHETAMINE SULFATE 1.25; 1.25; 1.25; 1.25 MG/1; MG/1; MG/1; MG/1
5 TABLET ORAL DAILY
Qty: 30 TABLET | Refills: 0 | Status: SHIPPED | OUTPATIENT
Start: 2023-11-24 | End: 2023-12-24

## 2023-10-24 RX ORDER — DEXTROAMPHETAMINE SACCHARATE, AMPHETAMINE ASPARTATE, DEXTROAMPHETAMINE SULFATE AND AMPHETAMINE SULFATE 1.25; 1.25; 1.25; 1.25 MG/1; MG/1; MG/1; MG/1
5 TABLET ORAL DAILY
Qty: 30 TABLET | Refills: 0 | Status: SHIPPED | OUTPATIENT
Start: 2023-10-24 | End: 2023-11-23

## 2023-10-24 RX ORDER — CETIRIZINE HYDROCHLORIDE 10 MG/1
10 TABLET ORAL DAILY
Qty: 90 TABLET | Refills: 1 | Status: SHIPPED | OUTPATIENT
Start: 2023-10-24 | End: 2023-11-24

## 2023-10-24 ASSESSMENT — PATIENT HEALTH QUESTIONNAIRE - PHQ9
10. IF YOU CHECKED OFF ANY PROBLEMS, HOW DIFFICULT HAVE THESE PROBLEMS MADE IT FOR YOU TO DO YOUR WORK, TAKE CARE OF THINGS AT HOME, OR GET ALONG WITH OTHER PEOPLE: NOT DIFFICULT AT ALL
SUM OF ALL RESPONSES TO PHQ QUESTIONS 1-9: 4
SUM OF ALL RESPONSES TO PHQ QUESTIONS 1-9: 4

## 2023-10-24 ASSESSMENT — ANXIETY QUESTIONNAIRES
GAD7 TOTAL SCORE: 4
1. FEELING NERVOUS, ANXIOUS, OR ON EDGE: SEVERAL DAYS
7. FEELING AFRAID AS IF SOMETHING AWFUL MIGHT HAPPEN: NOT AT ALL
2. NOT BEING ABLE TO STOP OR CONTROL WORRYING: SEVERAL DAYS
IF YOU CHECKED OFF ANY PROBLEMS ON THIS QUESTIONNAIRE, HOW DIFFICULT HAVE THESE PROBLEMS MADE IT FOR YOU TO DO YOUR WORK, TAKE CARE OF THINGS AT HOME, OR GET ALONG WITH OTHER PEOPLE: SOMEWHAT DIFFICULT
GAD7 TOTAL SCORE: 4
4. TROUBLE RELAXING: NOT AT ALL
5. BEING SO RESTLESS THAT IT IS HARD TO SIT STILL: NOT AT ALL
6. BECOMING EASILY ANNOYED OR IRRITABLE: SEVERAL DAYS
3. WORRYING TOO MUCH ABOUT DIFFERENT THINGS: SEVERAL DAYS

## 2023-10-24 NOTE — PROGRESS NOTES
Michelle is a 28 year old who is being evaluated via a billable video visit.      How would you like to obtain your AVS? MyChart  If the video visit is dropped, the invitation should be resent by: Text to cell phone: [phone]  Will anyone else be joining your video visit? No          Assessment & Plan     Moderate episode of recurrent major depressive disorder (H)  Depression has been well controlled with Lexapro and Wellbutrin but she has had more anxiety.  I wonder if the Wellbutrin is contributing to her feeling this way.  We will hold off on restarting Wellbutrin and increase Lexapro dose to 30 mg daily.  If depression symptoms worsen can restart Wellbutrin.  We could also consider augmenting Lexapro with a medication like buspirone  - escitalopram (LEXAPRO) 20 MG tablet; Take 1.5 tablets (30 mg) by mouth daily for 180 days    Generalized anxiety disorder  As above  - escitalopram (LEXAPRO) 20 MG tablet; Take 1.5 tablets (30 mg) by mouth daily for 180 days    Seasonal allergic rhinitis, unspecified trigger  Well-controlled  - cetirizine (ZYRTEC) 10 MG tablet; Take 1 tablet (10 mg) by mouth daily    Attention deficit hyperactivity disorder (ADHD), unspecified ADHD type  She will be starting school for [employer] science next month.  Currently taking Adderall XR 10 mg daily which has been working well without side effects.  Her class work will be at nighttime so we will start 5 mg immediate release to help with school.  - amphetamine-dextroamphetamine (ADDERALL) 5 MG tablet; Take 1 tablet (5 mg) by mouth daily for 30 days  - amphetamine-dextroamphetamine (ADDERALL) 5 MG tablet; Take 1 tablet (5 mg) by mouth daily for 30 days  - amphetamine-dextroamphetamine (ADDERALL) 5 MG tablet; Take 1 tablet (5 mg) by mouth daily for 30 days        Raji Baker [facility]   Michelle is a 28 year old, presenting for the following health issues:  Recheck Medication        10/24/2023      3:07 PM   Additional Questions   Roomed by Keira BRITTNY   Accompanied by Self       HPI     Depression and Anxiety Follow-Up  How are you doing with your depression since your last visit? Improved   How are you doing with your anxiety since your last visit?  No change  Are you having other symptoms that might be associated with depression or anxiety? Yes panic attacks   Have you had a significant life event? No   Do you have any concerns with your use of alcohol or other drugs? No  She states that with the Lexapro and Wellbutrin her depression symptoms are much much better.  However, she has noticed more anxiety.  She ran out of Wellbutrin last week and so she has not taken it for the past couple days.  Despite depression being under better control, her anxiety has been worse.    She is also currently taking Adderall XR 10 mg daily for ADHD.  This is going very well.  She denies any side effects.  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  She had previously been on a 5 mg immediate release tablet      Social History     Tobacco Use    Smoking status: Never    Smokeless tobacco: Never   Substance Use Topics    Alcohol use: No    Drug use: No         6/15/2023     1:07 PM 7/17/2023     4:46 PM 10/24/2023     3:26 PM   PHQ   PHQ-9 Total Score 11 10 4   Q9: Thoughts of better off dead/self-harm past 2 weeks Not at all Not at all Not at all         6/15/2023     1:07 PM 7/17/2023     4:46 PM 10/24/2023     3:27 PM   CAL-7 SCORE   Total Score   4 (minimal anxiety)   Total Score 13 6 4         10/24/2023     3:26 PM   Last PHQ-9   1.  Little interest or pleasure in doing things 0   2.  Feeling down, depressed, or hopeless 1   3.  Trouble falling or staying asleep, or sleeping too much 0   4.  Feeling tired or having little energy 1   5.  Poor appetite or overeating 0   6.  Feeling bad about  yourself 1   7.  Trouble concentrating 1   8.  Moving slowly or restless 0   Q9: Thoughts of better off dead/self-harm past 2 weeks 0   PHQ-9 Total Score 4         10/24/2023     3:27 PM   CAL-7    1. Feeling nervous, anxious, or on edge 1   2. Not being able to stop or control worrying 1   3. Worrying too much about different things 1   4. Trouble relaxing 0   5. Being so restless that it is hard to sit still 0   6. Becoming easily annoyed or irritable 1   7. Feeling afraid, as if something awful might happen 0   CAL-7 Total Score 4   If you checked any problems, how difficult have they made it for you to do your work, take care of things at home, or get along with other people? Somewhat difficult         Review of Systems   Constitutional, HEENT, cardiovascular, pulmonary, gi and gu systems are negative, except as otherwise noted.      Objective           Vitals:  No vitals were obtained today due to virtual visit.    Physical Exam   GENERAL: Healthy, alert and no distress  EYES: Eyes grossly normal to inspection.  No discharge or erythema, or obvious scleral/conjunctival abnormalities.  RESP: No audible wheeze, cough, or visible cyanosis.  No visible retractions or increased work of breathing.    SKIN: Visible skin clear. No significant rash, abnormal pigmentation or lesions.  NEURO: Cranial nerves grossly intact.  Mentation and speech appropriate for age.  PSYCH: Mentation appears normal, affect normal/bright, judgement and insight intact, normal speech and appearance well-groomed.            Video-Visit Details     Type of service:  Video Visit   Video Start Time: 3:35 PM  Video End Time:3:56 PM    Originating Location (pt. Location): Home    Distant Location (provider location):  On-site  Platform used for Video Visit: [employer]      Answers submitted by the patient for this visit:  Patient Health Questionnaire (Submitted on 10/24/2023)  If you checked off any problems, how difficult have these problems made it  for you to do your work, take care of things at home, or get along with other people?: Not difficult at all  PHQ9 TOTAL SCORE: 4  CAL-7 (Submitted on 10/24/2023)  CAL 7 TOTAL SCORE: 4

## 2023-11-24 ENCOUNTER — TELEPHONE (OUTPATIENT)
Dept: FAMILY MEDICINE | Facility: CLINIC | Age: 28
End: 2023-11-24
Payer: COMMERCIAL

## 2023-11-24 DIAGNOSIS — F33.1 MODERATE EPISODE OF RECURRENT MAJOR DEPRESSIVE DISORDER (H): ICD-10-CM

## 2023-11-24 DIAGNOSIS — F41.1 GENERALIZED ANXIETY DISORDER: ICD-10-CM

## 2023-11-24 DIAGNOSIS — F90.9 ATTENTION DEFICIT HYPERACTIVITY DISORDER (ADHD), UNSPECIFIED ADHD TYPE: ICD-10-CM

## 2023-11-24 DIAGNOSIS — J30.2 SEASONAL ALLERGIC RHINITIS, UNSPECIFIED TRIGGER: ICD-10-CM

## 2023-11-24 RX ORDER — DEXTROAMPHETAMINE SACCHARATE, AMPHETAMINE ASPARTATE, DEXTROAMPHETAMINE SULFATE AND AMPHETAMINE SULFATE 1.25; 1.25; 1.25; 1.25 MG/1; MG/1; MG/1; MG/1
5 TABLET ORAL DAILY
Qty: 30 TABLET | Refills: 0 | Status: SHIPPED | OUTPATIENT
Start: 2023-12-25

## 2023-11-24 RX ORDER — CETIRIZINE HYDROCHLORIDE 10 MG/1
10 TABLET ORAL DAILY
Qty: 90 TABLET | Refills: 1 | Status: SHIPPED | OUTPATIENT
Start: 2023-11-24 | End: 2024-04-24

## 2023-11-24 RX ORDER — BUPROPION HYDROCHLORIDE 150 MG/1
150 TABLET ORAL EVERY MORNING
Qty: 90 TABLET | Refills: 0 | Status: SHIPPED | OUTPATIENT
Start: 2023-11-24 | End: 2024-04-24

## 2023-11-24 NOTE — TELEPHONE ENCOUNTER
Medication Question or Refill    Patient is almost out     What medication are you calling about (include dose and sig)?:     escitalopram (LEXAPRO) 20 MG tablet       Preferred Pharmacy:     [id number] - [place], [place] - [address]  AT [facility] [address] [place] [address]  Phone: [phone] Fax: [phone]      Controlled Substance Agreement on file:   CSA -- Patient Level:     [Media Unavailable] Controlled Substance Agreement - Non - Opioid - Scan on 10/15/2021  8:40 AM: NON-OPIOID CONTROLLED SUBSTANCE AGREEMENT   [Media Unavailable] Controlled Substance Agreement - Non - Opioid - Scan on 10/23/2020 12:30 PM: NON-OPIOID CONTROLLED SUBSTANCE AGREEMENT   [Media Unavailable] Controlled Substance Agreement - Non - Opioid - Scan on 5/31/2019  9:02 AM: NON-OPIOID CONTROLLED SUBSTANCE AGREEMENT       Who prescribed the medication?: pcp    Do you need a refill? Yes    When did you use the medication last? 10/24/23    Patient offered an appointment? No    Do you have any questions or concerns?  No      Could we send this information to you in [facility] or would you prefer to receive a phone call?:   Patient would prefer a phone call   Okay to leave a detailed message?: Yes at Cell number on file:    Telephone Information:   Mobile [phone]   Mobile [phone]     Keira ESCUDERO

## 2023-11-24 NOTE — TELEPHONE ENCOUNTER
Medication Question or Refill        What medication are you calling about (include dose and sig)?:     cetirizine (ZYRTEC) 10 MG tablet   buPROPion (WELLBUTRIN XL) 150 MG 24 hr tablet     amphetamine-dextroamphetamine (ADDERALL) 5 MG tablet     Preferred Pharmacy:       [id number] - [place], [place] - [address] [facility] [address]  [address] [place] [address]  Phone: [phone] Fax: [phone]      Controlled Substance Agreement on file:   CSA -- Patient Level:     [Media Unavailable] Controlled Substance Agreement - Non - Opioid - Scan on 10/15/2021  8:40 AM: NON-OPIOID CONTROLLED SUBSTANCE AGREEMENT   [Media Unavailable] Controlled Substance Agreement - Non - Opioid - Scan on 10/23/2020 12:30 PM: NON-OPIOID CONTROLLED SUBSTANCE AGREEMENT   [Media Unavailable] Controlled Substance Agreement - Non - Opioid - Scan on 5/31/2019  9:02 AM: NON-OPIOID CONTROLLED SUBSTANCE AGREEMENT       Who prescribed the medication?: pcp    Do you need a refill? Yes    When did you use the medication last? na    Patient offered an appointment? No    Do you have any questions or concerns?  No      Could we send this information to you in [facility] or would you prefer to receive a phone call?:   Patient would prefer a phone call   Okay to leave a detailed message?: Yes at Cell number on file:    Telephone Information:   Mobile [phone]   Mobile [phone]     Keira K      escitalopram (LEXAPRO) 20 MG tablet       buPROPion (WELLBUTRIN XL) 150 MG 24 hr tablet

## 2023-11-26 ENCOUNTER — HEALTH MAINTENANCE LETTER (OUTPATIENT)
Age: 28
End: 2023-11-26

## 2023-11-27 RX ORDER — ESCITALOPRAM OXALATE 20 MG/1
30 TABLET ORAL DAILY
Qty: 135 TABLET | Refills: 1 | Status: SHIPPED | OUTPATIENT
Start: 2023-11-27 | End: 2024-04-24

## 2023-11-27 NOTE — TELEPHONE ENCOUNTER
Patient is calling in regards to escitalopram rx. Patient reports that pharmacy states the escitalopram 20mg rx was cancelled by provider on 10/24/23.   Writer placed call to pharmacy to verify if they have recieved rx. Luzmaria reports they do not have record of escitalopram 20mg w/ directions to take 1.5 tablets that was sent, and is requesting the prescription to be resent. Luzmaria reports the last rx for excitalopram was 7/17/23. Please resend rx.     Patient would like to call for updates, ok to LM on phone

## 2023-11-27 NOTE — TELEPHONE ENCOUNTER
Rx resent.      for patient informing her we sent her prescription to the pharmacy.     IMAN HELTON RN on 11/27/2023 at 12:49 PM   [facility]

## 2024-04-24 ENCOUNTER — OFFICE VISIT (OUTPATIENT)
Dept: FAMILY MEDICINE | Facility: CLINIC | Age: 29
End: 2024-04-24
Payer: COMMERCIAL

## 2024-04-24 VITALS
OXYGEN SATURATION: 100 % | DIASTOLIC BLOOD PRESSURE: 78 MMHG | WEIGHT: 133 LBS | BODY MASS INDEX: 24.48 KG/M2 | TEMPERATURE: 97.9 F | SYSTOLIC BLOOD PRESSURE: 122 MMHG | HEIGHT: 62 IN | HEART RATE: 92 BPM | RESPIRATION RATE: 18 BRPM

## 2024-04-24 DIAGNOSIS — Z00.00 ROUTINE GENERAL MEDICAL EXAMINATION AT A HEALTH CARE FACILITY: Primary | ICD-10-CM

## 2024-04-24 DIAGNOSIS — F33.1 MODERATE EPISODE OF RECURRENT MAJOR DEPRESSIVE DISORDER (H): ICD-10-CM

## 2024-04-24 DIAGNOSIS — F90.9 ATTENTION DEFICIT HYPERACTIVITY DISORDER (ADHD), UNSPECIFIED ADHD TYPE: ICD-10-CM

## 2024-04-24 DIAGNOSIS — J30.2 SEASONAL ALLERGIC RHINITIS, UNSPECIFIED TRIGGER: ICD-10-CM

## 2024-04-24 DIAGNOSIS — F41.1 GENERALIZED ANXIETY DISORDER: ICD-10-CM

## 2024-04-24 PROCEDURE — 90471 IMMUNIZATION ADMIN: CPT | Performed by: FAMILY MEDICINE

## 2024-04-24 PROCEDURE — 90715 TDAP VACCINE 7 YRS/> IM: CPT | Performed by: FAMILY MEDICINE

## 2024-04-24 PROCEDURE — 99395 PREV VISIT EST AGE 18-39: CPT | Performed by: FAMILY MEDICINE

## 2024-04-24 PROCEDURE — 99214 OFFICE O/P EST MOD 30 MIN: CPT | Mod: 25 | Performed by: FAMILY MEDICINE

## 2024-04-24 RX ORDER — DEXTROAMPHETAMINE SACCHARATE, AMPHETAMINE ASPARTATE MONOHYDRATE, DEXTROAMPHETAMINE SULFATE AND AMPHETAMINE SULFATE 3.75; 3.75; 3.75; 3.75 MG/1; MG/1; MG/1; MG/1
15 CAPSULE, EXTENDED RELEASE ORAL DAILY
Qty: 30 CAPSULE | Refills: 0 | Status: SHIPPED | OUTPATIENT
Start: 2024-04-24 | End: 2024-05-24

## 2024-04-24 RX ORDER — DEXTROAMPHETAMINE SACCHARATE, AMPHETAMINE ASPARTATE MONOHYDRATE, DEXTROAMPHETAMINE SULFATE AND AMPHETAMINE SULFATE 3.75; 3.75; 3.75; 3.75 MG/1; MG/1; MG/1; MG/1
15 CAPSULE, EXTENDED RELEASE ORAL DAILY
Qty: 30 CAPSULE | Refills: 0 | OUTPATIENT
Start: 2024-05-25

## 2024-04-24 RX ORDER — BUPROPION HYDROCHLORIDE 300 MG/1
300 TABLET ORAL EVERY MORNING
Qty: 90 TABLET | Refills: 1 | Status: SHIPPED | OUTPATIENT
Start: 2024-04-24

## 2024-04-24 RX ORDER — DEXTROAMPHETAMINE SACCHARATE, AMPHETAMINE ASPARTATE, DEXTROAMPHETAMINE SULFATE AND AMPHETAMINE SULFATE 1.25; 1.25; 1.25; 1.25 MG/1; MG/1; MG/1; MG/1
5 TABLET ORAL DAILY
Qty: 30 TABLET | Refills: 0 | Status: CANCELLED | OUTPATIENT
Start: 2024-04-24

## 2024-04-24 RX ORDER — HYDROXYZINE HYDROCHLORIDE 25 MG/1
25-50 TABLET, FILM COATED ORAL 3 TIMES DAILY PRN
Qty: 30 TABLET | Refills: 0 | Status: SHIPPED | OUTPATIENT
Start: 2024-04-24 | End: 2024-09-09

## 2024-04-24 RX ORDER — ESCITALOPRAM OXALATE 20 MG/1
30 TABLET ORAL DAILY
Qty: 135 TABLET | Refills: 1 | OUTPATIENT
Start: 2024-04-24

## 2024-04-24 RX ORDER — ESCITALOPRAM OXALATE 20 MG/1
30 TABLET ORAL DAILY
Qty: 135 TABLET | Refills: 1 | Status: SHIPPED | OUTPATIENT
Start: 2024-04-24

## 2024-04-24 RX ORDER — DEXTROAMPHETAMINE SACCHARATE, AMPHETAMINE ASPARTATE MONOHYDRATE, DEXTROAMPHETAMINE SULFATE AND AMPHETAMINE SULFATE 3.75; 3.75; 3.75; 3.75 MG/1; MG/1; MG/1; MG/1
15 CAPSULE, EXTENDED RELEASE ORAL DAILY
Qty: 30 CAPSULE | Refills: 0 | OUTPATIENT
Start: 2024-04-24

## 2024-04-24 RX ORDER — HYDROXYZINE HYDROCHLORIDE 25 MG/1
25-50 TABLET, FILM COATED ORAL 3 TIMES DAILY PRN
Qty: 30 TABLET | Refills: 0 | OUTPATIENT
Start: 2024-04-24

## 2024-04-24 RX ORDER — CETIRIZINE HYDROCHLORIDE 10 MG/1
10 TABLET ORAL DAILY
Qty: 90 TABLET | Refills: 1 | Status: SHIPPED | OUTPATIENT
Start: 2024-04-24

## 2024-04-24 RX ORDER — DEXTROAMPHETAMINE SACCHARATE, AMPHETAMINE ASPARTATE MONOHYDRATE, DEXTROAMPHETAMINE SULFATE AND AMPHETAMINE SULFATE 3.75; 3.75; 3.75; 3.75 MG/1; MG/1; MG/1; MG/1
15 CAPSULE, EXTENDED RELEASE ORAL DAILY
Qty: 30 CAPSULE | Refills: 0 | Status: SHIPPED | OUTPATIENT
Start: 2024-05-25 | End: 2024-06-24

## 2024-04-24 RX ORDER — BUPROPION HYDROCHLORIDE 300 MG/1
300 TABLET ORAL EVERY MORNING
Qty: 90 TABLET | Refills: 1 | OUTPATIENT
Start: 2024-04-24

## 2024-04-24 RX ORDER — DEXTROAMPHETAMINE SACCHARATE, AMPHETAMINE ASPARTATE MONOHYDRATE, DEXTROAMPHETAMINE SULFATE AND AMPHETAMINE SULFATE 3.75; 3.75; 3.75; 3.75 MG/1; MG/1; MG/1; MG/1
15 CAPSULE, EXTENDED RELEASE ORAL DAILY
Qty: 30 CAPSULE | Refills: 0 | Status: SHIPPED | OUTPATIENT
Start: 2024-06-25 | End: 2024-07-25

## 2024-04-24 RX ORDER — DEXTROAMPHETAMINE SACCHARATE, AMPHETAMINE ASPARTATE MONOHYDRATE, DEXTROAMPHETAMINE SULFATE AND AMPHETAMINE SULFATE 3.75; 3.75; 3.75; 3.75 MG/1; MG/1; MG/1; MG/1
15 CAPSULE, EXTENDED RELEASE ORAL DAILY
Qty: 30 CAPSULE | Refills: 0 | OUTPATIENT
Start: 2024-06-25

## 2024-04-24 SDOH — HEALTH STABILITY: PHYSICAL HEALTH: ON AVERAGE, HOW MANY DAYS PER WEEK DO YOU ENGAGE IN MODERATE TO STRENUOUS EXERCISE (LIKE A BRISK WALK)?: 3 DAYS

## 2024-04-24 ASSESSMENT — ANXIETY QUESTIONNAIRES
7. FEELING AFRAID AS IF SOMETHING AWFUL MIGHT HAPPEN: NOT AT ALL
IF YOU CHECKED OFF ANY PROBLEMS ON THIS QUESTIONNAIRE, HOW DIFFICULT HAVE THESE PROBLEMS MADE IT FOR YOU TO DO YOUR WORK, TAKE CARE OF THINGS AT HOME, OR GET ALONG WITH OTHER PEOPLE: VERY DIFFICULT
4. TROUBLE RELAXING: NEARLY EVERY DAY
GAD7 TOTAL SCORE: 12
7. FEELING AFRAID AS IF SOMETHING AWFUL MIGHT HAPPEN: NOT AT ALL
GAD7 TOTAL SCORE: 12
5. BEING SO RESTLESS THAT IT IS HARD TO SIT STILL: NOT AT ALL
GAD7 TOTAL SCORE: 12
2. NOT BEING ABLE TO STOP OR CONTROL WORRYING: MORE THAN HALF THE DAYS
1. FEELING NERVOUS, ANXIOUS, OR ON EDGE: MORE THAN HALF THE DAYS
3. WORRYING TOO MUCH ABOUT DIFFERENT THINGS: MORE THAN HALF THE DAYS
8. IF YOU CHECKED OFF ANY PROBLEMS, HOW DIFFICULT HAVE THESE MADE IT FOR YOU TO DO YOUR WORK, TAKE CARE OF THINGS AT HOME, OR GET ALONG WITH OTHER PEOPLE?: VERY DIFFICULT
6. BECOMING EASILY ANNOYED OR IRRITABLE: NEARLY EVERY DAY

## 2024-04-24 ASSESSMENT — PATIENT HEALTH QUESTIONNAIRE - PHQ9
10. IF YOU CHECKED OFF ANY PROBLEMS, HOW DIFFICULT HAVE THESE PROBLEMS MADE IT FOR YOU TO DO YOUR WORK, TAKE CARE OF THINGS AT HOME, OR GET ALONG WITH OTHER PEOPLE: VERY DIFFICULT
SUM OF ALL RESPONSES TO PHQ QUESTIONS 1-9: 13
SUM OF ALL RESPONSES TO PHQ QUESTIONS 1-9: 13

## 2024-04-24 ASSESSMENT — SOCIAL DETERMINANTS OF HEALTH (SDOH): HOW OFTEN DO YOU GET TOGETHER WITH FRIENDS OR RELATIVES?: MORE THAN THREE TIMES A WEEK

## 2024-04-24 NOTE — PROGRESS NOTES
Preventive Care Visit  [facility] [place]  Raji Baker DO, Family Medicine  Apr 24, 2024      Assessment & Plan     Routine general medical examination at a health care facility  Health maintenance reviewed and updated. Emphasized importance of balanced diet and regular exercise. Strongly encouraged scheduling pap smear as well.      Attention deficit hyperactivity disorder (ADHD), unspecified ADHD type  Continue Adderall  - amphetamine-dextroamphetamine (ADDERALL XR) 15 MG 24 hr capsule; Take 1 capsule (15 mg) by mouth daily for 30 days  - amphetamine-dextroamphetamine (ADDERALL XR) 15 MG 24 hr capsule; Take 1 capsule (15 mg) by mouth daily for 30 days  - amphetamine-dextroamphetamine (ADDERALL XR) 15 MG 24 hr capsule; Take 1 capsule (15 mg) by mouth daily for 30 days  - buPROPion (WELLBUTRIN XL) 300 MG 24 hr tablet; Take 1 tablet (300 mg) by mouth every morning    Moderate episode of recurrent major depressive disorder (H)  Continue Lexapro  - escitalopram (LEXAPRO) 20 MG tablet; Take 1.5 tablets (30 mg) by mouth daily    Generalized anxiety disorder  Continue Lexapro and hydroxyzine  - escitalopram (LEXAPRO) 20 MG tablet; Take 1.5 tablets (30 mg) by mouth daily  - hydrOXYzine HCl (ATARAX) 25 MG tablet; Take 1-2 tablets (25-50 mg) by mouth 3 times daily as needed for anxiety    Patient has been advised of split billing requirements and indicates understanding: Yes        Counseling  Appropriate preventive services were discussed with this patient, including applicable screening as appropriate for fall prevention, nutrition, physical activity, Tobacco-use cessation, weight loss and cognition.  Checklist reviewing preventive services available has been given to the patient.  The patient's PHQ-9 score is consistent with moderate depression. She was provided with information regarding depression.         Prasad Martinez is a 28 year old, presenting for the following:  Physical         4/24/2024     9:46 AM   Additional Questions   Roomed by Rhea ZELAYA        Health Care Directive  Patient does not have a Health Care Directive or Living Will: Discussed advance care planning with patient; however, patient declined at this time.    HPI    Depression and Anxiety   How are you doing with your depression since your last visit? No change  How are you doing with your anxiety since your last visit?  Worsened   Are you having other symptoms that might be associated with depression or anxiety? Yes:  has had 3 panic attacks since new years, worries a lot- irritable   Have you had a significant life event? OTHER: stopped working but is back in school so this is good- parents are helping her, is planning to move to [place] in August    Do you have any concerns with your use of alcohol or other drugs? No    ADHD: continues on Adderall. Denies any headaches, tachycardia, palpitations, numbness/tingling, insomnia, irritability, mood changes, tics, sweating, weight loss, dry mouth      Social History     Tobacco Use    Smoking status: Never    Smokeless tobacco: Never   Substance Use Topics    Alcohol use: No    Drug use: No         7/17/2023     4:46 PM 10/24/2023     3:26 PM 4/24/2024     9:43 AM   PHQ   PHQ-9 Total Score 10 4 13   Q9: Thoughts of better off dead/self-harm past 2 weeks Not at all Not at all Not at all         7/17/2023     4:46 PM 10/24/2023     3:27 PM 4/24/2024     9:43 AM   CAL-7 SCORE   Total Score  4 (minimal anxiety) 12 (moderate anxiety)   Total Score 6 4 12           4/24/2024   General Health   How would you rate your overall physical health? Good   Feel stress (tense, anxious, or unable to sleep) Rather much   (!) STRESS CONCERN      4/24/2024   Nutrition   Three or more servings of calcium each day? Yes   Diet: Vegetarian/vegan   How many servings of fruit and vegetables per day? (!) 2-3   How many sweetened beverages each day? 0-1         4/24/2024   Exercise   Days per week of  "moderate/strenous exercise 3 days         4/24/2024   Social Factors   Frequency of gathering with friends or relatives More than three times a week   Worry food won't last until get money to buy more No   Food not last or not have enough money for food? No   Do you have housing?  Yes   Are you worried about losing your housing? No   Lack of transportation? No   Unable to get utilities (heat,electricity)? No         4/24/2024   Dental   Dentist two times every year? Yes          Today's PHQ-9 Score:       4/24/2024     9:43 AM   PHQ-9 SCORE   PHQ-9 Total Score MyChart 13 (Moderate depression)   PHQ-9 Total Score 13         4/24/2024   Substance Use   Alcohol more than 3/day or more than 7/wk Not Applicable   Do you use any other substances recreationally? No     Social History     Tobacco Use    Smoking status: Never    Smokeless tobacco: Never   Substance Use Topics    Alcohol use: No    Drug use: No               4/24/2024   STI Screening   New sexual partner(s) since last STI/HIV test? No     History of abnormal Pap smear: NO - age 21-29 PAP every 3 years recommended - patient declined today.        8/30/2016    12:00 AM   PAP / HPV   PAP (Historical) NIL            4/24/2024   Contraception/Family Planning   Questions about contraception or family planning No        Reviewed and updated as needed this visit by Provider     Meds       Sexual Activity          Past Medical History:   Diagnosis Date    CAL (generalised anxiety disorder)     Moderate major depression (H)      Past Surgical History:   Procedure Laterality Date    NO HISTORY OF SURGERY         Review of Systems  Constitutional, HEENT, cardiovascular, pulmonary, gi and gu systems are negative, except as otherwise noted.     Objective      Exam  /78   Pulse 92   Temp 97.9  F (36.6  C)   Resp 18   Ht 1.575 m (5' 2\")   Wt 60.3 kg (133 lb)   LMP 04/19/2024   SpO2 100%   BMI 24.33 kg/m     Estimated body mass index is 24.33 kg/m  as " "calculated from the following:    Height as of this encounter: 1.575 m (5' 2\").    Weight as of this encounter: 60.3 kg (133 lb).    Physical Exam  GENERAL: alert and no distress  HENT: ear canals and TM's normal, nose and mouth without ulcers or lesions  NECK: no adenopathy, no asymmetry, masses, or scars  RESP: lungs clear to auscultation - no rales, rhonchi or wheezes  CV: regular rates and rhythm, normal S1 S2, no S3 or S4, and no murmur, click or rub  MS: no gross musculoskeletal defects noted, no edema  SKIN: no suspicious lesions or rashes  PSYCH: mentation appears normal, affect normal/bright        Signed Electronically by: Raji Baker DO  "

## 2024-04-24 NOTE — PATIENT INSTRUCTIONS
Preventive Care Advice   This is general advice given by our system to help you stay healthy. However, your care team may have specific advice just for you. Please talk to your care team about your preventive care needs.  Nutrition  Eat 5 or more servings of fruits and vegetables each day.  Try wheat bread, brown rice and whole grain pasta (instead of white bread, rice, and pasta).  Get enough calcium and vitamin D. Check the label on foods and aim for 100% of the RDA (recommended daily allowance).  Lifestyle  Exercise at least 150 minutes each week   (30 minutes a day, 5 days a week).  Do muscle strengthening activities 2 days a week. These help control your weight and prevent disease.  No smoking.  Wear sunscreen to prevent skin cancer.  Have a dental exam and cleaning every 6 months.  Yearly exams  See your health care team every year to talk about:  Any changes in your health.  Any medicines your care team has prescribed.  Preventive care, family planning, and ways to prevent chronic diseases.  Shots (vaccines)   HPV shots (up to age 26), if you've never had them before.  Hepatitis B shots (up to age 59), if you've never had them before.  COVID-19 shot: Get this shot when it's due.  Flu shot: Get a flu shot every year.  Tetanus shot: Get a tetanus shot every 10 years.  Pneumococcal, hepatitis A, and RSV shots: Ask your care team if you need these based on your risk.  Shingles shot (for age 50 and up).  General health tests  Diabetes screening:  Starting at age 35, Get screened for diabetes at least every 3 years.  If you are younger than age 35, ask your care team if you should be screened for diabetes.  Cholesterol test: At age 39, start having a cholesterol test every 5 years, or more often if advised.  Bone density scan (DEXA): At age 50, ask your care team if you should have this scan for osteoporosis (brittle bones).  Hepatitis C: Get tested at least once in your life.  STIs (sexually transmitted  infections)  Before age 24: Ask your care team if you should be screened for STIs.  After age 24: Get screened for STIs if you're at risk. You are at risk for STIs (including HIV) if:  You are sexually active with more than one person.  You don't use condoms every time.  You or a partner was diagnosed with a sexually transmitted infection.  If you are at risk for HIV, ask about PrEP medicine to prevent HIV.  Get tested for HIV at least once in your life, whether you are at risk for HIV or not.  Cancer screening tests  Cervical cancer screening: If you have a cervix, begin getting regular cervical cancer screening tests at age 21. Most people who have regular screenings with normal results can stop after age 65. Talk about this with your provider.  Breast cancer scan (mammogram): If you've ever had breasts, begin having regular mammograms starting at age 40. This is a scan to check for breast cancer.  Colon cancer screening: It is important to start screening for colon cancer at age 45.  Have a colonoscopy test every 10 years (or more often if you're at risk) Or, ask your provider about stool tests like a FIT test every year or Cologuard test every 3 years.  To learn more about your testing options, visit: https://www.Malhar/507153.pdf.  For help making a decision, visit: https://bit.ly/kc10332.  Prostate cancer screening test: If you have a prostate and are age 55 to 69, ask your provider if you would benefit from a yearly prostate cancer screening test.  Lung cancer screening: If you are a current or former smoker age 50 to 80, ask your care team if ongoing lung cancer screenings are right for you.  For informational purposes only. Not to replace the advice of your health care provider. Copyright   2023 [place] [employer] All rights reserved. Clinically reviewed by the [facility]. HealthyTweet 382158 - REV 01/24.    Learning About Stress  What is stress?     Stress is your  body's response to a hard situation. Your body can have a physical, emotional, or mental response. Stress is a fact of life for most people, and it affects everyone differently. What causes stress for you may not be stressful for someone else.  A lot of things can cause stress. You may feel stress when you go on a job interview, take a test, or run a race. This kind of short-term stress is normal and even useful. It can help you if you need to work hard or react quickly. For example, stress can help you finish an important job on time.  Long-term stress is caused by ongoing stressful situations or events. Examples of long-term stress include long-term health problems, ongoing problems at work, or conflicts in your family. Long-term stress can harm your health.  How does stress affect your health?  When you are stressed, your body responds as though you are in danger. It makes hormones that speed up your heart, make you breathe faster, and give you a burst of energy. This is called the fight-or-flight stress response. If the stress is over quickly, your body goes back to normal and no harm is done.  But if stress happens too often or lasts too long, it can have bad effects. Long-term stress can make you more likely to get sick, and it can make symptoms of some diseases worse. If you tense up when you are stressed, you may develop neck, shoulder, or low back pain. Stress is linked to high blood pressure and heart disease.  Stress also harms your emotional health. It can make you olson, tense, or depressed. Your relationships may suffer, and you may not do well at work or school.  What can you do to manage stress?  You can try these things to help manage stress:   Do something active. Exercise or activity can help reduce stress. Walking is a great way to get started. Even everyday activities such as housecleaning or yard work can help.  Try yoga or juanito chi. These techniques combine exercise and meditation. You may need  some training at first to learn them.  Do something you enjoy. For example, listen to music or go to a movie. Practice your hobby or do volunteer work.  Meditate. This can help you relax, because you are not worrying about what happened before or what may happen in the future.  Do guided imagery. Imagine yourself in any setting that helps you feel calm. You can use online videos, books, or a teacher to guide you.  Do breathing exercises. For example:  From a standing position, bend forward from the waist with your knees slightly bent. Let your arms dangle close to the floor.  Breathe in slowly and deeply as you return to a standing position. Roll up slowly and lift your head last.  Hold your breath for just a few seconds in the standing position.  Breathe out slowly and bend forward from the waist.  Let your feelings out. Talk, laugh, cry, and express anger when you need to. Talking with supportive friends or family, a counselor, or a tracy leader about your feelings is a healthy way to relieve stress. Avoid discussing your feelings with people who make you feel worse.  Write. It may help to write about things that are bothering you. This helps you find out how much stress you feel and what is causing it. When you know this, you can find better ways to cope.  What can you do to prevent stress?  You might try some of these things to help prevent stress:  Manage your time. This helps you find time to do the things you want and need to do.  Get enough sleep. Your body recovers from the stresses of the day while you are sleeping.  Get support. Your family, friends, and community can make a difference in how you experience stress.  Limit your news feed. Avoid or limit time on social media or news that may make you feel stressed.  Do something active. Exercise or activity can help reduce stress. Walking is a great way to get started.  Where can you learn more?  Go to https://www.healthwise.net/patiented  Enter N032 in the  "search box to learn more about \"Learning About Stress.\"  Current as of: October 24, 2023               Content Version: 14.0    2860-1876 [employer].   Care instructions adapted under license by your healthcare professional. If you have questions about a medical condition or this instruction, always ask your healthcare professional. [employer] disclaims any warranty or liability for your use of this information.      "

## 2024-04-24 NOTE — TELEPHONE ENCOUNTER
Prescription approved per [facility] Refill Protocol.  Linette Mccann, RN  [facility] Triage Nurse

## 2024-04-24 NOTE — LETTER
[facility] [place]  04/24/24  Patient: Mica Roy  YOB: 1995  Medical Record Number: [id number]                                                                                  Non-Opioid Controlled Substance Agreement    This is an agreement between you and your provider regarding safe and appropriate use of controlled substances prescribed by your care team. Controlled substances are?medicines that can cause physical and mental dependence (abuse).     There are strict laws about having and using these medicines. We here at [facility] are  committed to working with you in your efforts to get better. To support you in this work, we'll help you schedule regular office appointments for medicine refills. If we must cancel or change your appointment for any reason, we'll make sure you have enough medicine to last until your next appointment.     As a Provider, I will:   Listen carefully to your concerns while treating you with respect.   Recommend a treatment plan that I believe is in your best interest and may involve therapies other than medicine.    Talk with you often about the possible benefits and the risk of harm of any medicine that we prescribe for you.  Assess the safety of this medicine and check how well it works.    Provide a plan on how to taper (discontinue or go off) using this medicine if the decision is made to stop its use.      ::  As a Patient, I understand controlled substances:     Are prescribed by my care provider to help me function or work and manage my condition(s).?  Are strong medicines and can cause serious side effects.     Need to be taken exactly as prescribed.?Combining controlled substances with certain medicines or chemicals (such as illegal drugs, alcohol, sedatives, sleeping pills, and benzodiazepines) can be dangerous or even fatal.? If I stop taking my medicines suddenly, I may have severe withdrawal symptoms.     The risks, benefits,  and side effects of these medicine(s) were explained to me. I agree that:    I will take part in other treatments as advised by my care team. This may be psychiatry or counseling, physical therapy, behavioral therapy, group treatment or a referral to specialist.    I will keep all my appointments and understand this is part of the monitoring of controlled substances.?My care team may require an office visit for EVERY controlled substance refill. If I miss appointments or don t follow instructions, my care team may stop my medicine    I will take my medicines as prescribed. I will not change the dose or schedule unless my care team tells me to. There will be no refills if I run out early.      I may be asked to come to the clinic and complete a urine drug test or complete a pill count. If I don t give a urine sample or participate in a pill count, the care team may stop my medicine.    I will only receive controlled substance prescriptions from this clinic. If I am treated by another provider, I will tell them that I am taking controlled substances and that I have a treatment agreement with this provider. I will inform my [facility] care team within one business day if I am given a prescription for any controlled substance by another healthcare provider. My [facility] care team can contact other providers and pharmacists about my use of any medicines.    It is up to me to make sure that I don't run out of my medicines on weekends or holidays.?If my care team is willing to refill my prescription without a visit, I must request refills only during office hours. Refills may take up to 3 business days to process. I will use one pharmacy to fill all my controlled substance prescriptions. I will notify the clinic about any changes to my insurance or medicine availability.    I am responsible for my prescriptions. If the medicine/prescription is lost, stolen or destroyed, it will not be replaced.?I also agree  not to share controlled substance medicines with anyone.     I am aware I should not use any illegal or recreational drugs. I agree not to drink alcohol unless my care team says I can.     If I enroll in the [place] Medical Cannabis program, I will tell my care team before my next refill.    I will tell my care team right away if I become pregnant, have a new medical problem treated outside of my regular clinic, or have a change in my medicines.     I understand that this medicine can affect my thinking, judgment and reaction time.? Alcohol and drugs affect the brain and body, which can affect the safety of my driving. Being under the influence of alcohol or drugs can affect my decision-making, behaviors, personal safety and the safety of others. Driving while impaired (DWI) can occur if a person is driving, operating or in physical control of a car, motorcycle, boat, snowmobile, ATV, motorbike, off-road vehicle or any other motor vehicle ([place] Statute 169A.20). I understand the risk if I choose to drive or operate any vehicle or machinery.    I understand that if I do not follow any of the conditions above, my prescriptions or treatment may be stopped or changed.   I agree that my provider, clinic care team and pharmacy may work with any city, state or federal law enforcement agency that investigates the misuse, sale or other diversion of my controlled medicine. I will allow my provider to discuss my care with, or share a copy of, this agreement with any other treating provider, pharmacy or emergency room where I receive care.     I have read this agreement and have asked questions about anything I did not understand.    ________________________________________________________  Patient Signature - Mica HOOK noel     ___________________                   Date     ________________________________________________________  Provider Signature - Raji Baker,        ___________________                   Date      ________________________________________________________  Witness Signature (required if provider not present while patient signing)          ___________________                   Date

## 2024-04-24 NOTE — TELEPHONE ENCOUNTER
Medications were refilled and sent to the pharmacy today (4/24/24). Will refuse refill requests to the pharmacy as duplicate.    Linette Mccann RN

## 2024-04-25 RX ORDER — DEXTROAMPHETAMINE SACCHARATE, AMPHETAMINE ASPARTATE, DEXTROAMPHETAMINE SULFATE AND AMPHETAMINE SULFATE 1.25; 1.25; 1.25; 1.25 MG/1; MG/1; MG/1; MG/1
5 TABLET ORAL DAILY
Qty: 30 TABLET | Refills: 0 | Status: SHIPPED | OUTPATIENT
Start: 2024-04-25 | End: 2024-05-25

## 2024-04-25 RX ORDER — DEXTROAMPHETAMINE SACCHARATE, AMPHETAMINE ASPARTATE, DEXTROAMPHETAMINE SULFATE AND AMPHETAMINE SULFATE 1.25; 1.25; 1.25; 1.25 MG/1; MG/1; MG/1; MG/1
5 TABLET ORAL DAILY
Qty: 30 TABLET | Refills: 0 | Status: SHIPPED | OUTPATIENT
Start: 2024-05-26 | End: 2024-06-25

## 2024-04-25 RX ORDER — DEXTROAMPHETAMINE SACCHARATE, AMPHETAMINE ASPARTATE, DEXTROAMPHETAMINE SULFATE AND AMPHETAMINE SULFATE 1.25; 1.25; 1.25; 1.25 MG/1; MG/1; MG/1; MG/1
5 TABLET ORAL DAILY
Qty: 30 TABLET | Refills: 0 | Status: SHIPPED | OUTPATIENT
Start: 2024-06-26 | End: 2024-07-26

## 2024-09-09 DIAGNOSIS — F90.9 ATTENTION DEFICIT HYPERACTIVITY DISORDER (ADHD), UNSPECIFIED ADHD TYPE: ICD-10-CM

## 2024-09-09 DIAGNOSIS — F41.1 GENERALIZED ANXIETY DISORDER: ICD-10-CM

## 2024-09-09 RX ORDER — HYDROXYZINE HYDROCHLORIDE 25 MG/1
25-50 TABLET, FILM COATED ORAL 3 TIMES DAILY PRN
Qty: 30 TABLET | Refills: 0 | Status: SHIPPED | OUTPATIENT
Start: 2024-09-09

## 2024-09-09 ASSESSMENT — PATIENT HEALTH QUESTIONNAIRE - PHQ9
SUM OF ALL RESPONSES TO PHQ QUESTIONS 1-9: 5
10. IF YOU CHECKED OFF ANY PROBLEMS, HOW DIFFICULT HAVE THESE PROBLEMS MADE IT FOR YOU TO DO YOUR WORK, TAKE CARE OF THINGS AT HOME, OR GET ALONG WITH OTHER PEOPLE: SOMEWHAT DIFFICULT
SUM OF ALL RESPONSES TO PHQ QUESTIONS 1-9: 5

## 2024-09-09 ASSESSMENT — ANXIETY QUESTIONNAIRES
GAD7 TOTAL SCORE: 4
8. IF YOU CHECKED OFF ANY PROBLEMS, HOW DIFFICULT HAVE THESE MADE IT FOR YOU TO DO YOUR WORK, TAKE CARE OF THINGS AT HOME, OR GET ALONG WITH OTHER PEOPLE?: SOMEWHAT DIFFICULT
7. FEELING AFRAID AS IF SOMETHING AWFUL MIGHT HAPPEN: NOT AT ALL
GAD7 TOTAL SCORE: 4
GAD7 TOTAL SCORE: 4

## 2024-09-09 NOTE — TELEPHONE ENCOUNTER
Patient is calling for refills and to schedule an appointment for medication follow up. Currently we are working on an issue with her insurance that will not allow us to schedule her an appointment. Magy the clinic manager is aware of this and working on getting it fixed.

## 2024-09-09 NOTE — TELEPHONE ENCOUNTER
Patient needing Adderall XR 10 mg daily sent to a different pharmacy as it is on backorder with her [facility] in [place].  Please send to [facility].  ALE Zaragoza R.N.

## 2024-09-10 ENCOUNTER — OFFICE VISIT (OUTPATIENT)
Dept: FAMILY MEDICINE | Facility: CLINIC | Age: 29
End: 2024-09-10
Payer: COMMERCIAL

## 2024-09-10 ENCOUNTER — TELEPHONE (OUTPATIENT)
Dept: FAMILY MEDICINE | Facility: CLINIC | Age: 29
End: 2024-09-10

## 2024-09-10 VITALS
RESPIRATION RATE: 18 BRPM | WEIGHT: 130 LBS | TEMPERATURE: 97.1 F | DIASTOLIC BLOOD PRESSURE: 64 MMHG | HEART RATE: 109 BPM | OXYGEN SATURATION: 100 % | HEIGHT: 62 IN | SYSTOLIC BLOOD PRESSURE: 108 MMHG | BODY MASS INDEX: 23.92 KG/M2

## 2024-09-10 DIAGNOSIS — F90.9 ATTENTION DEFICIT HYPERACTIVITY DISORDER (ADHD), UNSPECIFIED ADHD TYPE: ICD-10-CM

## 2024-09-10 DIAGNOSIS — F33.1 MODERATE EPISODE OF RECURRENT MAJOR DEPRESSIVE DISORDER (H): ICD-10-CM

## 2024-09-10 DIAGNOSIS — F41.1 GENERALIZED ANXIETY DISORDER: ICD-10-CM

## 2024-09-10 PROCEDURE — G2211 COMPLEX E/M VISIT ADD ON: HCPCS | Performed by: FAMILY MEDICINE

## 2024-09-10 PROCEDURE — 99214 OFFICE O/P EST MOD 30 MIN: CPT | Performed by: FAMILY MEDICINE

## 2024-09-10 PROCEDURE — 80307 DRUG TEST PRSMV CHEM ANLYZR: CPT | Performed by: FAMILY MEDICINE

## 2024-09-10 RX ORDER — DEXTROAMPHETAMINE SACCHARATE, AMPHETAMINE ASPARTATE MONOHYDRATE, DEXTROAMPHETAMINE SULFATE AND AMPHETAMINE SULFATE 2.5; 2.5; 2.5; 2.5 MG/1; MG/1; MG/1; MG/1
10 CAPSULE, EXTENDED RELEASE ORAL DAILY
Qty: 30 CAPSULE | Refills: 0 | Status: SHIPPED | OUTPATIENT
Start: 2024-11-09 | End: 2024-12-09

## 2024-09-10 RX ORDER — DEXTROAMPHETAMINE SACCHARATE, AMPHETAMINE ASPARTATE MONOHYDRATE, DEXTROAMPHETAMINE SULFATE AND AMPHETAMINE SULFATE 2.5; 2.5; 2.5; 2.5 MG/1; MG/1; MG/1; MG/1
10 CAPSULE, EXTENDED RELEASE ORAL DAILY
Qty: 30 CAPSULE | Refills: 0 | Status: SHIPPED | OUTPATIENT
Start: 2024-10-10 | End: 2024-11-09

## 2024-09-10 RX ORDER — DEXTROAMPHETAMINE SACCHARATE, AMPHETAMINE ASPARTATE MONOHYDRATE, DEXTROAMPHETAMINE SULFATE AND AMPHETAMINE SULFATE 2.5; 2.5; 2.5; 2.5 MG/1; MG/1; MG/1; MG/1
10 CAPSULE, EXTENDED RELEASE ORAL DAILY
Qty: 30 CAPSULE | Refills: 0 | Status: SHIPPED | OUTPATIENT
Start: 2024-09-10

## 2024-09-10 RX ORDER — DEXTROAMPHETAMINE SACCHARATE, AMPHETAMINE ASPARTATE, DEXTROAMPHETAMINE SULFATE AND AMPHETAMINE SULFATE 1.25; 1.25; 1.25; 1.25 MG/1; MG/1; MG/1; MG/1
5 TABLET ORAL DAILY
Qty: 30 TABLET | Refills: 0 | Status: SHIPPED | OUTPATIENT
Start: 2024-10-10 | End: 2024-11-09

## 2024-09-10 RX ORDER — DEXTROAMPHETAMINE SACCHARATE, AMPHETAMINE ASPARTATE MONOHYDRATE, DEXTROAMPHETAMINE SULFATE AND AMPHETAMINE SULFATE 2.5; 2.5; 2.5; 2.5 MG/1; MG/1; MG/1; MG/1
10 CAPSULE, EXTENDED RELEASE ORAL DAILY
Qty: 30 CAPSULE | Refills: 0 | Status: SHIPPED | OUTPATIENT
Start: 2024-09-10 | End: 2024-09-10

## 2024-09-10 RX ORDER — DEXTROAMPHETAMINE SACCHARATE, AMPHETAMINE ASPARTATE, DEXTROAMPHETAMINE SULFATE AND AMPHETAMINE SULFATE 1.25; 1.25; 1.25; 1.25 MG/1; MG/1; MG/1; MG/1
5 TABLET ORAL DAILY
Qty: 30 TABLET | Refills: 0 | Status: SHIPPED | OUTPATIENT
Start: 2024-11-09 | End: 2024-12-09

## 2024-09-10 RX ORDER — DEXTROAMPHETAMINE SACCHARATE, AMPHETAMINE ASPARTATE, DEXTROAMPHETAMINE SULFATE AND AMPHETAMINE SULFATE 1.25; 1.25; 1.25; 1.25 MG/1; MG/1; MG/1; MG/1
5 TABLET ORAL DAILY
Qty: 30 TABLET | Refills: 0 | Status: CANCELLED | OUTPATIENT
Start: 2024-09-10

## 2024-09-10 RX ORDER — DEXTROAMPHETAMINE SACCHARATE, AMPHETAMINE ASPARTATE, DEXTROAMPHETAMINE SULFATE AND AMPHETAMINE SULFATE 1.25; 1.25; 1.25; 1.25 MG/1; MG/1; MG/1; MG/1
5 TABLET ORAL DAILY
Qty: 30 TABLET | Refills: 0 | Status: SHIPPED | OUTPATIENT
Start: 2024-09-10 | End: 2024-10-10

## 2024-09-10 NOTE — PROGRESS NOTES
Assessment & Plan     Attention deficit hyperactivity disorder (ADHD), unspecified ADHD type  Medication working well. Continue current plan. Next prescriptions written out as she is moving to [place] this week. Advised she should set up primary care down there in the next 3 months to ensure no lapse in prescriptions.  - amphetamine-dextroamphetamine (ADDERALL) 5 MG tablet; Take 1 tablet (5 mg) by mouth daily.  - amphetamine-dextroamphetamine (ADDERALL) 5 MG tablet; Take 1 tablet (5 mg) by mouth daily.  - amphetamine-dextroamphetamine (ADDERALL) 5 MG tablet; Take 1 tablet (5 mg) by mouth daily.  - amphetamine-dextroamphetamine (ADDERALL XR) 10 MG 24 hr capsule; Take 1 capsule (10 mg) by mouth daily.  - amphetamine-dextroamphetamine (ADDERALL XR) 10 MG 24 hr capsule; Take 1 capsule (10 mg) by mouth daily.  - Urine Drug Screen; Future  - Urine Drug Screen    Moderate episode of recurrent major depressive disorder (H)  Stable, continue Wellbutrin and Lexapro    Generalized anxiety disorder  As above.      Prasad Martinez is a 29 year old, presenting for the following health issues:  Recheck Medication        9/10/2024     2:01 PM   Additional Questions   Roomed by Rhea ZELAYA     History of Present Illness       Mental Health Follow-up:  Patient presents to follow-up on Depression & Anxiety.Patient's depression since last visit has been:  Better  The patient is not having other symptoms associated with depression.  Patient's anxiety since last visit has been:  No change  The patient is not having other symptoms associated with anxiety.  Any significant life events: No  Patient is feeling anxious or having panic attacks.  Patient has no concerns about alcohol or drug use.    She eats 0-1 servings of fruits and vegetables daily.She consumes 3 sweetened beverage(s) daily.She exercises with enough effort to increase her heart rate 30 to 60 minutes per day.  She exercises with enough effort to increase her heart rate 4 days  "per week. She is missing 2 dose(s) of medications per week.  She is not taking prescribed medications regularly due to other.           10/24/2023     3:26 PM 4/24/2024     9:43 AM 9/9/2024     7:40 PM   PHQ   PHQ-9 Total Score 4 13 5   Q9: Thoughts of better off dead/self-harm past 2 weeks Not at all Not at all Not at all          10/24/2023     3:27 PM 4/24/2024     9:43 AM 9/9/2024     7:42 PM   CAL-7 SCORE   Total Score 4 (minimal anxiety) 12 (moderate anxiety) 4 (minimal anxiety)   Total Score 4 12 4       ADHD: currently taking Adderall XR 10 mg and Adderall IR 5 mg. Doesn't take every day but medication is working well. Denies any headaches, tachycardia, palpitations, numbness/tingling, insomnia, irritability, mood changes, tics, sweating, weight loss, dry mouth         Review of Systems  Constitutional, HEENT, cardiovascular, pulmonary, gi and gu systems are negative, except as otherwise noted.      Objective    /64   Pulse 109   Temp 97.1  F (36.2  C)   Resp 18   Ht 1.575 m (5' 2\")   Wt 59 kg (130 lb)   LMP 08/22/2024   SpO2 100%   BMI 23.78 kg/m    Body mass index is 23.78 kg/m .  Physical Exam   GENERAL: alert and no distress  RESP: lungs clear to auscultation - no rales, rhonchi or wheezes  CV: regular rates and rhythm, normal S1 S2, no S3 or S4, and no murmur, click or rub  MS: no gross musculoskeletal defects noted, no edema  PSYCH: mentation appears normal, affect normal/bright    The longitudinal plan of care for the diagnosis(es)/condition(s) as documented were addressed during this visit. Due to the added complexity in care, I will continue to support Michelle in the subsequent management and with ongoing continuity of care.          Signed Electronically by: Raji Baker DO  "

## 2024-09-10 NOTE — TELEPHONE ENCOUNTER
amphetamine-dextroamphetamine (ADDERALL XR) 10 MG 24 hr capsule  10 mg, DAILY 0 ordered          Dose, Route, Frequency: 10 mg, Oral, DAILY            Patient calls to ask for this script of adderall 10mg XR to go to an alternate pharmacy ( [facility] in [place] . Original pharmacy is out of stock of the 10 mg XR but she was able to   the 5mg.       Patient can be reached at [phone]

## 2024-09-11 LAB
AMPHETAMINES UR QL SCN: NORMAL
BARBITURATES UR QL SCN: NORMAL
BENZODIAZ UR QL SCN: NORMAL
BZE UR QL SCN: NORMAL
CANNABINOIDS UR QL SCN: NORMAL
FENTANYL UR QL: NORMAL
OPIATES UR QL SCN: NORMAL
PCP QUAL URINE (ROCHE): NORMAL

## 2024-09-11 RX ORDER — DEXTROAMPHETAMINE SACCHARATE, AMPHETAMINE ASPARTATE MONOHYDRATE, DEXTROAMPHETAMINE SULFATE AND AMPHETAMINE SULFATE 2.5; 2.5; 2.5; 2.5 MG/1; MG/1; MG/1; MG/1
10 CAPSULE, EXTENDED RELEASE ORAL DAILY
Qty: 30 CAPSULE | Refills: 0 | OUTPATIENT
Start: 2024-09-11

## 2024-10-15 ENCOUNTER — TELEPHONE (OUTPATIENT)
Dept: FAMILY MEDICINE | Facility: CLINIC | Age: 29
End: 2024-10-15

## 2024-10-15 DIAGNOSIS — F90.9 ATTENTION DEFICIT HYPERACTIVITY DISORDER (ADHD), UNSPECIFIED ADHD TYPE: ICD-10-CM

## 2024-10-15 NOTE — TELEPHONE ENCOUNTER
Patient calling to report the [facility] in [place], [place] is NOT able to dispense local prints of prescriptions from out of state. Pt was advised rx would need to be sent electronically.    Patient has not been able to get Sept or Oct Adderall 5mg and Adderall 10mg rxs.      Please send the Adderall 5mg and Adderall 10mg Sept, Oct, Nov rx to [facility]'s electronically. Pharmacy desired attached.     Pt would like a callback to be notified once sent.

## 2024-10-16 RX ORDER — DEXTROAMPHETAMINE SACCHARATE, AMPHETAMINE ASPARTATE MONOHYDRATE, DEXTROAMPHETAMINE SULFATE AND AMPHETAMINE SULFATE 2.5; 2.5; 2.5; 2.5 MG/1; MG/1; MG/1; MG/1
10 CAPSULE, EXTENDED RELEASE ORAL DAILY
Qty: 30 CAPSULE | Refills: 0 | Status: SHIPPED | OUTPATIENT
Start: 2024-10-16

## 2024-10-16 RX ORDER — DEXTROAMPHETAMINE SACCHARATE, AMPHETAMINE ASPARTATE, DEXTROAMPHETAMINE SULFATE AND AMPHETAMINE SULFATE 1.25; 1.25; 1.25; 1.25 MG/1; MG/1; MG/1; MG/1
5 TABLET ORAL DAILY
Qty: 30 TABLET | Refills: 0 | Status: SHIPPED | OUTPATIENT
Start: 2024-10-16

## 2024-10-16 NOTE — TELEPHONE ENCOUNTER
New Rx sent to pharmacy. She should hopefully have set up new primary care in [place] to take over prescribing medications.    Rjai Baker,   10/16/2024 1:32 PM

## 2024-10-17 NOTE — TELEPHONE ENCOUNTER
Placed call to patient to advise of PCPs message below. Patient presented understanding, will try to find primary down there - she will be done there for awhile.

## 2025-02-18 ENCOUNTER — TELEPHONE (OUTPATIENT)
Dept: FAMILY MEDICINE | Facility: CLINIC | Age: 30
End: 2025-02-18
Payer: MEDICAID

## 2025-02-18 NOTE — TELEPHONE ENCOUNTER
General Call    Contacts       Contact Date/Time Type Contact Phone/Fax    02/18/2025 08:23 AM CST Phone (Outgoing) Michelle Roy (Self) [phone] (M)    Left Message           Reason for Call:  Possibly too soon for px    What are your questions or concerns:  Michelle is scheduled tomorrow for a px, but her last one was 4/24/24, so she should check with her insurance to see if any changes need to be made to the visit.     Date of last appointment with provider: 9/10/24    Could we send this information to you in [facility] or would you prefer to receive a phone call?:   No preference   Okay to leave a detailed message?: No at Home number on file [phone] ([place])

## 2025-02-19 ENCOUNTER — OFFICE VISIT (OUTPATIENT)
Dept: FAMILY MEDICINE | Facility: CLINIC | Age: 30
End: 2025-02-19
Payer: MEDICAID

## 2025-02-19 VITALS
BODY MASS INDEX: 24.84 KG/M2 | HEART RATE: 89 BPM | RESPIRATION RATE: 16 BRPM | OXYGEN SATURATION: 100 % | HEIGHT: 62 IN | SYSTOLIC BLOOD PRESSURE: 106 MMHG | DIASTOLIC BLOOD PRESSURE: 78 MMHG | TEMPERATURE: 97.8 F | WEIGHT: 135 LBS

## 2025-02-19 DIAGNOSIS — F33.1 MODERATE EPISODE OF RECURRENT MAJOR DEPRESSIVE DISORDER (H): ICD-10-CM

## 2025-02-19 DIAGNOSIS — Z32.00 ENCOUNTER FOR PREGNANCY TEST, RESULT UNKNOWN: ICD-10-CM

## 2025-02-19 DIAGNOSIS — Z23 NEED FOR MMR VACCINE: Primary | ICD-10-CM

## 2025-02-19 DIAGNOSIS — F90.9 ATTENTION DEFICIT HYPERACTIVITY DISORDER (ADHD), UNSPECIFIED ADHD TYPE: ICD-10-CM

## 2025-02-19 DIAGNOSIS — F41.1 GENERALIZED ANXIETY DISORDER: ICD-10-CM

## 2025-02-19 LAB — HCG UR QL: NEGATIVE

## 2025-02-19 PROCEDURE — 81025 URINE PREGNANCY TEST: CPT | Performed by: FAMILY MEDICINE

## 2025-02-19 PROCEDURE — 90471 IMMUNIZATION ADMIN: CPT | Performed by: FAMILY MEDICINE

## 2025-02-19 PROCEDURE — G2211 COMPLEX E/M VISIT ADD ON: HCPCS | Performed by: FAMILY MEDICINE

## 2025-02-19 PROCEDURE — 90707 MMR VACCINE SC: CPT | Performed by: FAMILY MEDICINE

## 2025-02-19 PROCEDURE — 96127 BRIEF EMOTIONAL/BEHAV ASSMT: CPT | Performed by: FAMILY MEDICINE

## 2025-02-19 PROCEDURE — 99214 OFFICE O/P EST MOD 30 MIN: CPT | Mod: 25 | Performed by: FAMILY MEDICINE

## 2025-02-19 RX ORDER — LISDEXAMFETAMINE DIMESYLATE 30 MG/1
30 CAPSULE ORAL EVERY MORNING
Qty: 30 CAPSULE | Refills: 0 | Status: SHIPPED | OUTPATIENT
Start: 2025-02-19

## 2025-02-19 RX ORDER — ESCITALOPRAM OXALATE 20 MG/1
30 TABLET ORAL DAILY
Qty: 135 TABLET | Refills: 1 | Status: SHIPPED | OUTPATIENT
Start: 2025-02-19

## 2025-02-19 RX ORDER — BUPROPION HYDROCHLORIDE 300 MG/1
300 TABLET ORAL EVERY MORNING
Qty: 90 TABLET | Refills: 1 | Status: SHIPPED | OUTPATIENT
Start: 2025-02-19

## 2025-02-19 ASSESSMENT — ANXIETY QUESTIONNAIRES
GAD7 TOTAL SCORE: 10
6. BECOMING EASILY ANNOYED OR IRRITABLE: SEVERAL DAYS
IF YOU CHECKED OFF ANY PROBLEMS ON THIS QUESTIONNAIRE, HOW DIFFICULT HAVE THESE PROBLEMS MADE IT FOR YOU TO DO YOUR WORK, TAKE CARE OF THINGS AT HOME, OR GET ALONG WITH OTHER PEOPLE: VERY DIFFICULT
3. WORRYING TOO MUCH ABOUT DIFFERENT THINGS: MORE THAN HALF THE DAYS
2. NOT BEING ABLE TO STOP OR CONTROL WORRYING: MORE THAN HALF THE DAYS
8. IF YOU CHECKED OFF ANY PROBLEMS, HOW DIFFICULT HAVE THESE MADE IT FOR YOU TO DO YOUR WORK, TAKE CARE OF THINGS AT HOME, OR GET ALONG WITH OTHER PEOPLE?: VERY DIFFICULT
7. FEELING AFRAID AS IF SOMETHING AWFUL MIGHT HAPPEN: NOT AT ALL
5. BEING SO RESTLESS THAT IT IS HARD TO SIT STILL: SEVERAL DAYS
7. FEELING AFRAID AS IF SOMETHING AWFUL MIGHT HAPPEN: NOT AT ALL
4. TROUBLE RELAXING: MORE THAN HALF THE DAYS
1. FEELING NERVOUS, ANXIOUS, OR ON EDGE: MORE THAN HALF THE DAYS

## 2025-02-19 ASSESSMENT — PATIENT HEALTH QUESTIONNAIRE - PHQ9
SUM OF ALL RESPONSES TO PHQ QUESTIONS 1-9: 8
SUM OF ALL RESPONSES TO PHQ QUESTIONS 1-9: 8
10. IF YOU CHECKED OFF ANY PROBLEMS, HOW DIFFICULT HAVE THESE PROBLEMS MADE IT FOR YOU TO DO YOUR WORK, TAKE CARE OF THINGS AT HOME, OR GET ALONG WITH OTHER PEOPLE: VERY DIFFICULT

## 2025-02-19 NOTE — PROGRESS NOTES
Assessment & Plan       1. Need for MMR vaccine (Primary)  Needs second MMR  - MMR (M-M-R II)    2. Encounter for pregnancy test, result unknown  Planning to start Acutane with online pharmacy. Needs UPT. Advised that they should be checking her labs while on medication  - HCG qualitative urine; Future    3. Attention deficit hyperactivity disorder (ADHD), unspecified ADHD type  Stable but interested in changing to Vyvanse. Will start 30 mg daily. Follow up in 1 month.  - lisdexamfetamine (VYVANSE) 30 MG capsule; Take 1 capsule (30 mg) by mouth every morning.  Dispense: 30 capsule; Refill: 0    4. Moderate episode of recurrent major depressive disorder (H)  Restart medications  - buPROPion (WELLBUTRIN XL) 300 MG 24 hr tablet; Take 1 tablet (300 mg) by mouth every morning.  Dispense: 90 tablet; Refill: 1  - escitalopram (LEXAPRO) 20 MG tablet; Take 1.5 tablets (30 mg) by mouth daily.  Dispense: 135 tablet; Refill: 1    5. Generalized anxiety disorder  - escitalopram (LEXAPRO) 20 MG tablet; Take 1.5 tablets (30 mg) by mouth daily.  Dispense: 135 tablet; Refill: 1            Prasad Martinez is a 29 year old, presenting for the following health issues:  Recheck Medication        2/19/2025     7:53 AM   Additional Questions   Roomed by Ciera SHI CMA     History of Present Illness       Mental Health Follow-up:  Patient presents to follow-up on Depression & Anxiety.Patient's depression since last visit has been:  Medium  The patient is not having other symptoms associated with depression.  Patient's anxiety since last visit has been:  Bad  The patient is not having other symptoms associated with anxiety.  Any significant life events: other  Patient is not feeling anxious or having panic attacks.  Patient has no concerns about alcohol or drug use.    She eats 4 or more servings of fruits and vegetables daily.She consumes 4 sweetened beverage(s) daily.She exercises with enough effort to increase her heart rate 30 to 60  "minutes per day.  She exercises with enough effort to increase her heart rate 3 or less days per week. She is missing 7 dose(s) of medications per week.  She is not taking prescribed medications regularly due to remembering to take.           4/24/2024     9:43 AM 9/9/2024     7:42 PM 2/19/2025     7:55 AM   CAL-7 SCORE   Total Score 12 (moderate anxiety) 4 (minimal anxiety) 10 (moderate anxiety)   Total Score 12 4 10        Patient-reported            4/24/2024     9:43 AM 9/9/2024     7:40 PM 2/19/2025     7:54 AM   PHQ   PHQ-9 Total Score 13 5 8    Q9: Thoughts of better off dead/self-harm past 2 weeks Not at all Not at all Not at all       Patient-reported        She got a job at [employer] as a       Medication Followup of ADHD on amphetamine-dextroamphetamine (ADDERALL XR) 10 MG 24 hr capsule and amphetamine-dextroamphetamine (ADDERALL) 5 MG tablet   Taking Medication as prescribed: yes  Side Effects:  trouble sleeping  Medication Helping Symptoms:  yes-is interested in discussing Vyvanse      She had moved to [place] in August but now back in [place]. Last dose was in November.        Review of Systems  Constitutional, HEENT, cardiovascular, pulmonary, gi and gu systems are negative, except as otherwise noted.      Objective    /78   Pulse 89   Temp 97.8  F (36.6  C) (Tympanic)   Resp 16   Ht 1.575 m (5' 2\")   Wt 61.2 kg (135 lb)   LMP 02/10/2025 (Exact Date)   SpO2 100%   BMI 24.69 kg/m    Body mass index is 24.69 kg/m .  Physical Exam   GENERAL: alert and no distress  RESP: lungs clear to auscultation - no rales, rhonchi or wheezes  CV: regular rates and rhythm, normal S1 S2, no S3 or S4, and no murmur, click or rub  MS: no gross musculoskeletal defects noted, no edema  PSYCH: mentation appears normal, affect normal/bright        The longitudinal plan of care for the diagnosis(es)/condition(s) as documented were addressed during this visit. Due to the added complexity in care, I will continue to " support Michelle in the subsequent management and with ongoing continuity of care.        Signed Electronically by: Raji Baker DO

## 2025-03-25 ENCOUNTER — TELEPHONE (OUTPATIENT)
Dept: FAMILY MEDICINE | Facility: CLINIC | Age: 30
End: 2025-03-25
Payer: COMMERCIAL

## 2025-03-25 DIAGNOSIS — F90.9 ATTENTION DEFICIT HYPERACTIVITY DISORDER (ADHD), UNSPECIFIED ADHD TYPE: Primary | ICD-10-CM

## 2025-03-25 NOTE — TELEPHONE ENCOUNTER
Medication Question or Refill        What medication are you calling about (include dose and sig)?: 10 mg xr 5 mg extended release adderall     Preferred Pharmacy:   [facility] [place]  [address] [facility] [address]  Phone: [phone] Fax: [phone]    WRITTEN PRESCRIPTION REQUESTED  No address on file      [id number] - [place], [place] - [address]  [facility] [address]  [address] [place] [address]  Phone: [phone] Fax: [phone]    [id number] - [place], [place] - [address] [facility] [address] [facility] [address]  [address] [facility] [address]  Phone: [phone] Fax: [phone]    [id number] [place], [place] - [address] AT [facility] & [place]  [address] [facility] [address]  Phone: [phone] Fax: [phone]      Controlled Substance Agreement on file:   CSA -- Patient Level:     [Media Unavailable] Controlled Substance Agreement - Non - Opioid - Scan on 10/15/2021  8:40 AM: NON-OPIOID CONTROLLED SUBSTANCE AGREEMENT   [Media Unavailable] Controlled Substance Agreement - Non - Opioid - Scan on 10/23/2020 12:30 PM: NON-OPIOID CONTROLLED SUBSTANCE AGREEMENT   [Media Unavailable] Controlled Substance Agreement - Non - Opioid - Scan on 5/31/2019  9:02 AM: NON-OPIOID CONTROLLED SUBSTANCE AGREEMENT       Who prescribed the medication?:  Raji Baker    Do you need a refill? Yes    When did you use the medication last? daily    Patient offered an appointment? No    Do you have any questions or concerns?  Yes: wanted to switch back to adderall from VYVANSE  felt more irritated on this medications       Could we send this information to you in [facility] or would you prefer to receive a phone call?:   Patient would prefer a phone call   Okay to leave a detailed message?: Yes at Cell number on file:    Telephone Information:   Mobile [phone]   Mobile [phone]

## 2025-03-26 RX ORDER — DEXTROAMPHETAMINE SACCHARATE, AMPHETAMINE ASPARTATE, DEXTROAMPHETAMINE SULFATE AND AMPHETAMINE SULFATE 1.25; 1.25; 1.25; 1.25 MG/1; MG/1; MG/1; MG/1
5 TABLET ORAL DAILY
Qty: 30 TABLET | Refills: 0 | Status: SHIPPED | OUTPATIENT
Start: 2025-04-25 | End: 2025-05-25

## 2025-03-26 RX ORDER — DEXTROAMPHETAMINE SACCHARATE, AMPHETAMINE ASPARTATE MONOHYDRATE, DEXTROAMPHETAMINE SULFATE AND AMPHETAMINE SULFATE 2.5; 2.5; 2.5; 2.5 MG/1; MG/1; MG/1; MG/1
10 CAPSULE, EXTENDED RELEASE ORAL DAILY
Qty: 30 CAPSULE | Refills: 0 | Status: SHIPPED | OUTPATIENT
Start: 2025-03-26 | End: 2025-04-25

## 2025-03-26 RX ORDER — DEXTROAMPHETAMINE SACCHARATE, AMPHETAMINE ASPARTATE MONOHYDRATE, DEXTROAMPHETAMINE SULFATE AND AMPHETAMINE SULFATE 2.5; 2.5; 2.5; 2.5 MG/1; MG/1; MG/1; MG/1
10 CAPSULE, EXTENDED RELEASE ORAL DAILY
Qty: 30 CAPSULE | Refills: 0 | Status: SHIPPED | OUTPATIENT
Start: 2025-04-25 | End: 2025-05-25

## 2025-03-26 RX ORDER — DEXTROAMPHETAMINE SACCHARATE, AMPHETAMINE ASPARTATE, DEXTROAMPHETAMINE SULFATE AND AMPHETAMINE SULFATE 1.25; 1.25; 1.25; 1.25 MG/1; MG/1; MG/1; MG/1
5 TABLET ORAL DAILY
Qty: 30 TABLET | Refills: 0 | Status: SHIPPED | OUTPATIENT
Start: 2025-03-26 | End: 2025-04-25

## 2025-03-26 RX ORDER — DEXTROAMPHETAMINE SACCHARATE, AMPHETAMINE ASPARTATE, DEXTROAMPHETAMINE SULFATE AND AMPHETAMINE SULFATE 1.25; 1.25; 1.25; 1.25 MG/1; MG/1; MG/1; MG/1
5 TABLET ORAL DAILY
Qty: 30 TABLET | Refills: 0 | Status: SHIPPED | OUTPATIENT
Start: 2025-05-25 | End: 2025-06-24

## 2025-03-26 RX ORDER — DEXTROAMPHETAMINE SACCHARATE, AMPHETAMINE ASPARTATE MONOHYDRATE, DEXTROAMPHETAMINE SULFATE AND AMPHETAMINE SULFATE 2.5; 2.5; 2.5; 2.5 MG/1; MG/1; MG/1; MG/1
10 CAPSULE, EXTENDED RELEASE ORAL DAILY
Qty: 30 CAPSULE | Refills: 0 | Status: SHIPPED | OUTPATIENT
Start: 2025-05-25 | End: 2025-06-24

## 2025-03-26 NOTE — TELEPHONE ENCOUNTER
See note below: Do you have any questions or concerns?  Yes: wanted to switch back to adderall from VYVANSE  felt more irritated on this medications     Would you like a visit to discuss?    JAMES KiserN, RN

## 2025-03-27 NOTE — TELEPHONE ENCOUNTER
Called and spoke with patient.     Advised of providers recommendation. Patient stated an understanding and agreed with plan.     IMAN HELTON RN on 3/27/2025 at 4:26 PM   [facility]

## 2025-03-31 ENCOUNTER — LAB (OUTPATIENT)
Dept: LAB | Facility: CLINIC | Age: 30
End: 2025-03-31
Payer: COMMERCIAL

## 2025-03-31 DIAGNOSIS — Z32.00 ENCOUNTER FOR PREGNANCY TEST, RESULT UNKNOWN: ICD-10-CM

## 2025-03-31 LAB — HCG UR QL: NEGATIVE

## 2025-03-31 PROCEDURE — 81025 URINE PREGNANCY TEST: CPT

## 2025-05-07 ENCOUNTER — TELEPHONE (OUTPATIENT)
Dept: FAMILY MEDICINE | Facility: CLINIC | Age: 30
End: 2025-05-07
Payer: COMMERCIAL

## 2025-05-07 DIAGNOSIS — Z32.00 ENCOUNTER FOR PREGNANCY TEST, RESULT UNKNOWN: ICD-10-CM

## 2025-05-07 DIAGNOSIS — Z11.3 SCREEN FOR STD (SEXUALLY TRANSMITTED DISEASE): Primary | ICD-10-CM

## 2025-05-07 NOTE — TELEPHONE ENCOUNTER
Order/Referral Request    Who is requesting: Patient    Orders being requested: Pregnancy F/U test and   STD screen    Reason service is needed/diagnosis: Pt requesting F/U to last pregnancy test to get another test and a an STD panel at [facility] lab just to be sure she is clear but has no symptoms or concerns at this time.     When are orders needed by: as soon as possible    Has this been discussed with Provider: Yes  [facility]  Does patient have a preference on a Group/Provider/Facility? [facility]    Does patient have an appointment scheduled?: No    Where to send orders: Place orders within Epic    Could we send this information to you in [facility] or would you prefer to receive a phone call?:   Patient would prefer a phone call   Okay to leave a detailed message?: Yes at Cell number on file:    Telephone Information:   Mobile [phone]

## 2025-05-08 NOTE — TELEPHONE ENCOUNTER
Future orders signed. If other concerns/questions, she really should start an eVisit or schedule virtual visit for these tests to be ordered in the future.     Raji Baker DO  5/8/2025 8:56 AM

## 2025-05-20 ENCOUNTER — TELEPHONE (OUTPATIENT)
Dept: FAMILY MEDICINE | Facility: CLINIC | Age: 30
End: 2025-05-20
Payer: COMMERCIAL

## 2025-05-20 NOTE — TELEPHONE ENCOUNTER
Medication Question or Refill        What medication are you calling about (include dose and sig)?: amphetamine-dextroamphetamine (ADDERALL XR) 10 MG 24 hr capsule     Preferred Pharmacy:     WRITTEN PRESCRIPTION REQUESTED  No address on file      [id number] - [place], [place] - [address]  AT [facility] OF [address]  [address] [place] [address]  Phone: [phone] Fax: [phone]    Controlled Substance Agreement on file:   CSA -- Patient Level:     [Media Unavailable] Controlled Substance Agreement - Non - Opioid - Scan on 10/15/2021  8:40 AM: NON-OPIOID CONTROLLED SUBSTANCE AGREEMENT   [Media Unavailable] Controlled Substance Agreement - Non - Opioid - Scan on 10/23/2020 12:30 PM: NON-OPIOID CONTROLLED SUBSTANCE AGREEMENT   [Media Unavailable] Controlled Substance Agreement - Non - Opioid - Scan on 5/31/2019  9:02 AM: NON-OPIOID CONTROLLED SUBSTANCE AGREEMENT       Who prescribed the medication?: pcp     Do you need a refill? Yes    When did you use the medication last? Out of meds    Patient offered an appointment? No    Do you have any questions or concerns?  No    Could we send this information to you in [facility] or would you prefer to receive a phone call?:   Patient would prefer a phone call   Okay to leave a detailed message?: Yes at Cell number on file:    Telephone Information:   Mobile [phone]

## 2025-05-22 NOTE — TELEPHONE ENCOUNTER
There should already be a refill on file for Adderall XR 10 mg.    Raji Baker DO  5/22/2025 1:13 PM

## 2025-05-28 DIAGNOSIS — F90.9 ATTENTION DEFICIT HYPERACTIVITY DISORDER (ADHD), UNSPECIFIED ADHD TYPE: ICD-10-CM

## 2025-05-28 NOTE — TELEPHONE ENCOUNTER
Pt called stating [facility] is saying no Rx for Adderall 10 mg XR or 5mg tablets. Writer called [facility] and they said they do not have the Rx with refill date of 5/25/25 please resend.

## 2025-05-29 RX ORDER — DEXTROAMPHETAMINE SACCHARATE, AMPHETAMINE ASPARTATE MONOHYDRATE, DEXTROAMPHETAMINE SULFATE AND AMPHETAMINE SULFATE 2.5; 2.5; 2.5; 2.5 MG/1; MG/1; MG/1; MG/1
10 CAPSULE, EXTENDED RELEASE ORAL DAILY
Qty: 30 CAPSULE | Refills: 0 | Status: SHIPPED | OUTPATIENT
Start: 2025-05-29

## 2025-05-29 RX ORDER — DEXTROAMPHETAMINE SACCHARATE, AMPHETAMINE ASPARTATE, DEXTROAMPHETAMINE SULFATE AND AMPHETAMINE SULFATE 1.25; 1.25; 1.25; 1.25 MG/1; MG/1; MG/1; MG/1
5 TABLET ORAL DAILY
Qty: 30 TABLET | Refills: 0 | Status: SHIPPED | OUTPATIENT
Start: 2025-05-29

## 2025-05-31 ENCOUNTER — HEALTH MAINTENANCE LETTER (OUTPATIENT)
Age: 30
End: 2025-05-31

## 2025-09-04 ENCOUNTER — VIRTUAL VISIT (OUTPATIENT)
Dept: FAMILY MEDICINE | Facility: CLINIC | Age: 30
End: 2025-09-04
Payer: COMMERCIAL

## 2025-09-04 DIAGNOSIS — F33.1 MODERATE EPISODE OF RECURRENT MAJOR DEPRESSIVE DISORDER (H): ICD-10-CM

## 2025-09-04 DIAGNOSIS — F41.1 GENERALIZED ANXIETY DISORDER: Primary | ICD-10-CM

## 2025-09-04 DIAGNOSIS — F90.9 ATTENTION DEFICIT HYPERACTIVITY DISORDER (ADHD), UNSPECIFIED ADHD TYPE: ICD-10-CM

## 2025-09-04 RX ORDER — ESCITALOPRAM OXALATE 20 MG/1
30 TABLET ORAL DAILY
Qty: 135 TABLET | Refills: 1 | Status: SHIPPED | OUTPATIENT
Start: 2025-09-04

## 2025-09-04 RX ORDER — DEXTROAMPHETAMINE SACCHARATE, AMPHETAMINE ASPARTATE, DEXTROAMPHETAMINE SULFATE AND AMPHETAMINE SULFATE 1.25; 1.25; 1.25; 1.25 MG/1; MG/1; MG/1; MG/1
5 TABLET ORAL DAILY
Qty: 30 TABLET | Refills: 0 | Status: SHIPPED | OUTPATIENT
Start: 2025-10-04 | End: 2025-11-03

## 2025-09-04 RX ORDER — DEXTROAMPHETAMINE SACCHARATE, AMPHETAMINE ASPARTATE MONOHYDRATE, DEXTROAMPHETAMINE SULFATE AND AMPHETAMINE SULFATE 2.5; 2.5; 2.5; 2.5 MG/1; MG/1; MG/1; MG/1
10 CAPSULE, EXTENDED RELEASE ORAL DAILY
Qty: 30 CAPSULE | Refills: 0 | Status: SHIPPED | OUTPATIENT
Start: 2025-10-04 | End: 2025-11-03

## 2025-09-04 RX ORDER — DROSPIRENONE AND ETHINYL ESTRADIOL 0.02-3(28)
1 KIT ORAL DAILY
COMMUNITY
Start: 2025-06-26

## 2025-09-04 RX ORDER — DEXTROAMPHETAMINE SACCHARATE, AMPHETAMINE ASPARTATE MONOHYDRATE, DEXTROAMPHETAMINE SULFATE AND AMPHETAMINE SULFATE 2.5; 2.5; 2.5; 2.5 MG/1; MG/1; MG/1; MG/1
10 CAPSULE, EXTENDED RELEASE ORAL DAILY
Qty: 30 CAPSULE | Refills: 0 | Status: SHIPPED | OUTPATIENT
Start: 2025-11-03 | End: 2025-12-03

## 2025-09-04 RX ORDER — PROPRANOLOL HCL 20 MG
TABLET ORAL
Qty: 30 TABLET | Refills: 0 | Status: SHIPPED | OUTPATIENT
Start: 2025-09-04

## 2025-09-04 RX ORDER — DEXTROAMPHETAMINE SACCHARATE, AMPHETAMINE ASPARTATE MONOHYDRATE, DEXTROAMPHETAMINE SULFATE AND AMPHETAMINE SULFATE 2.5; 2.5; 2.5; 2.5 MG/1; MG/1; MG/1; MG/1
10 CAPSULE, EXTENDED RELEASE ORAL DAILY
Qty: 30 CAPSULE | Refills: 0 | Status: SHIPPED | OUTPATIENT
Start: 2025-09-04 | End: 2025-10-04

## 2025-09-04 RX ORDER — BUPROPION HYDROCHLORIDE 300 MG/1
300 TABLET ORAL EVERY MORNING
Qty: 90 TABLET | Refills: 1 | Status: SHIPPED | OUTPATIENT
Start: 2025-09-04

## 2025-09-04 RX ORDER — DEXTROAMPHETAMINE SACCHARATE, AMPHETAMINE ASPARTATE, DEXTROAMPHETAMINE SULFATE AND AMPHETAMINE SULFATE 1.25; 1.25; 1.25; 1.25 MG/1; MG/1; MG/1; MG/1
5 TABLET ORAL DAILY
Qty: 30 TABLET | Refills: 0 | Status: SHIPPED | OUTPATIENT
Start: 2025-11-03 | End: 2025-12-03

## 2025-09-04 RX ORDER — DEXTROAMPHETAMINE SACCHARATE, AMPHETAMINE ASPARTATE, DEXTROAMPHETAMINE SULFATE AND AMPHETAMINE SULFATE 1.25; 1.25; 1.25; 1.25 MG/1; MG/1; MG/1; MG/1
5 TABLET ORAL DAILY
Qty: 30 TABLET | Refills: 0 | Status: SHIPPED | OUTPATIENT
Start: 2025-09-04 | End: 2025-10-04

## 2025-09-04 ASSESSMENT — ANXIETY QUESTIONNAIRES
3. WORRYING TOO MUCH ABOUT DIFFERENT THINGS: SEVERAL DAYS
6. BECOMING EASILY ANNOYED OR IRRITABLE: SEVERAL DAYS
7. FEELING AFRAID AS IF SOMETHING AWFUL MIGHT HAPPEN: NOT AT ALL
GAD7 TOTAL SCORE: 6
1. FEELING NERVOUS, ANXIOUS, OR ON EDGE: SEVERAL DAYS
GAD7 TOTAL SCORE: 6
8. IF YOU CHECKED OFF ANY PROBLEMS, HOW DIFFICULT HAVE THESE MADE IT FOR YOU TO DO YOUR WORK, TAKE CARE OF THINGS AT HOME, OR GET ALONG WITH OTHER PEOPLE?: SOMEWHAT DIFFICULT
4. TROUBLE RELAXING: SEVERAL DAYS
7. FEELING AFRAID AS IF SOMETHING AWFUL MIGHT HAPPEN: NOT AT ALL
GAD7 TOTAL SCORE: 6
2. NOT BEING ABLE TO STOP OR CONTROL WORRYING: SEVERAL DAYS
IF YOU CHECKED OFF ANY PROBLEMS ON THIS QUESTIONNAIRE, HOW DIFFICULT HAVE THESE PROBLEMS MADE IT FOR YOU TO DO YOUR WORK, TAKE CARE OF THINGS AT HOME, OR GET ALONG WITH OTHER PEOPLE: SOMEWHAT DIFFICULT
5. BEING SO RESTLESS THAT IT IS HARD TO SIT STILL: SEVERAL DAYS

## 2025-09-04 ASSESSMENT — PATIENT HEALTH QUESTIONNAIRE - PHQ9
SUM OF ALL RESPONSES TO PHQ QUESTIONS 1-9: 5
SUM OF ALL RESPONSES TO PHQ QUESTIONS 1-9: 5
10. IF YOU CHECKED OFF ANY PROBLEMS, HOW DIFFICULT HAVE THESE PROBLEMS MADE IT FOR YOU TO DO YOUR WORK, TAKE CARE OF THINGS AT HOME, OR GET ALONG WITH OTHER PEOPLE: SOMEWHAT DIFFICULT